# Patient Record
Sex: FEMALE | Race: WHITE | NOT HISPANIC OR LATINO | Employment: OTHER | URBAN - METROPOLITAN AREA
[De-identification: names, ages, dates, MRNs, and addresses within clinical notes are randomized per-mention and may not be internally consistent; named-entity substitution may affect disease eponyms.]

---

## 2017-06-14 ENCOUNTER — TELEPHONE (OUTPATIENT)
Dept: INTERNAL MEDICINE | Facility: CLINIC | Age: 82
End: 2017-06-14

## 2017-06-14 NOTE — TELEPHONE ENCOUNTER
VIANCA FROM [facility] IS CALLING TO ADVISE OF PT'S INR TODAY IS 3.7. PT. HAS BEEN TAKING 3MG EVERYDAY EXCEPT TUES. AND FRI. 4.5 MG     plz call her and change to 3 mg qd but only on wed 4.5 mg and recheck inr next week

## 2017-06-28 ENCOUNTER — TELEPHONE (OUTPATIENT)
Dept: INTERNAL MEDICINE | Facility: CLINIC | Age: 82
End: 2017-06-28

## 2017-06-28 NOTE — TELEPHONE ENCOUNTER
Renee from [facility] is calling to advise pt's INR is 3.0. She has been currently taking 3 mg everyday except Wed. And she takes 4.5 mg. Please call to advise instructions.

## 2017-11-30 ENCOUNTER — TELEPHONE (OUTPATIENT)
Dept: INTERNAL MEDICINE | Facility: CLINIC | Age: 82
End: 2017-11-30

## 2017-12-01 ENCOUNTER — TELEPHONE (OUTPATIENT)
Dept: INTERNAL MEDICINE | Facility: CLINIC | Age: 82
End: 2017-12-01

## 2017-12-01 ENCOUNTER — HOSPITAL ENCOUNTER (INPATIENT)
Facility: HOSPITAL | Age: 82
LOS: 13 days | Discharge: HOSPICE | End: 2017-12-14
Attending: EMERGENCY MEDICINE | Admitting: INTERNAL MEDICINE

## 2017-12-01 ENCOUNTER — APPOINTMENT (OUTPATIENT)
Dept: GENERAL RADIOLOGY | Facility: HOSPITAL | Age: 82
End: 2017-12-01

## 2017-12-01 DIAGNOSIS — I48.21 PERMANENT ATRIAL FIBRILLATION (HCC): ICD-10-CM

## 2017-12-01 DIAGNOSIS — E87.70 HYPERVOLEMIA, UNSPECIFIED HYPERVOLEMIA TYPE: ICD-10-CM

## 2017-12-01 DIAGNOSIS — J18.9 PNEUMONIA OF BOTH LOWER LOBES DUE TO INFECTIOUS ORGANISM: ICD-10-CM

## 2017-12-01 DIAGNOSIS — R09.02 HYPOXEMIA: ICD-10-CM

## 2017-12-01 DIAGNOSIS — N39.0 URINARY TRACT INFECTION WITHOUT HEMATURIA, SITE UNSPECIFIED: ICD-10-CM

## 2017-12-01 DIAGNOSIS — I50.9 ACUTE ON CHRONIC CONGESTIVE HEART FAILURE, UNSPECIFIED CONGESTIVE HEART FAILURE TYPE: ICD-10-CM

## 2017-12-01 DIAGNOSIS — R50.9 ACUTE FEBRILE ILLNESS: Primary | ICD-10-CM

## 2017-12-01 PROBLEM — I48.91 ATRIAL FIBRILLATION (HCC): Status: ACTIVE | Noted: 2017-12-01

## 2017-12-01 PROBLEM — N18.9 ACUTE ON CHRONIC KIDNEY FAILURE (HCC): Status: ACTIVE | Noted: 2017-12-01

## 2017-12-01 PROBLEM — A41.9 SEPSIS (HCC): Status: ACTIVE | Noted: 2017-12-01

## 2017-12-01 PROBLEM — E03.9 HYPOTHYROIDISM: Status: ACTIVE | Noted: 2017-12-01

## 2017-12-01 PROBLEM — R79.1 SUPRATHERAPEUTIC INR: Status: ACTIVE | Noted: 2017-12-01

## 2017-12-01 PROBLEM — N18.9 CHRONIC KIDNEY DISEASE: Status: ACTIVE | Noted: 2017-12-01

## 2017-12-01 PROBLEM — N17.9 ACUTE ON CHRONIC KIDNEY FAILURE (HCC): Status: ACTIVE | Noted: 2017-12-01

## 2017-12-01 PROBLEM — D64.9 ANEMIA: Status: ACTIVE | Noted: 2017-12-01

## 2017-12-01 PROBLEM — I69.351 HEMIPARESIS AFFECTING RIGHT SIDE AS LATE EFFECT OF STROKE (HCC): Status: ACTIVE | Noted: 2017-12-01

## 2017-12-01 LAB
ALBUMIN SERPL-MCNC: 3.6 G/DL (ref 3.2–4.8)
ALBUMIN/GLOB SERPL: 1 G/DL (ref 1.5–2.5)
ALP SERPL-CCNC: 51 U/L (ref 25–100)
ALT SERPL W P-5'-P-CCNC: 6 U/L (ref 7–40)
ANION GAP SERPL CALCULATED.3IONS-SCNC: 10 MMOL/L (ref 3–11)
APTT PPP: 89.3 SECONDS (ref 24–31)
ARTERIAL PATENCY WRIST A: ABNORMAL
AST SERPL-CCNC: 26 U/L (ref 0–33)
ATMOSPHERIC PRESS: ABNORMAL MMHG
BACTERIA UR QL AUTO: ABNORMAL /HPF
BASE EXCESS BLDA CALC-SCNC: -2.6 MMOL/L (ref 0–2)
BASOPHILS # BLD AUTO: 0.01 10*3/MM3 (ref 0–0.2)
BASOPHILS NFR BLD AUTO: 0.1 % (ref 0–1)
BDY SITE: ABNORMAL
BILIRUB SERPL-MCNC: 0.3 MG/DL (ref 0.3–1.2)
BILIRUB UR QL STRIP: NEGATIVE
BNP SERPL-MCNC: 1060 PG/ML (ref 0–100)
BUN BLD-MCNC: 40 MG/DL (ref 9–23)
BUN/CREAT SERPL: 16.7 (ref 7–25)
CALCIUM SPEC-SCNC: 8.4 MG/DL (ref 8.7–10.4)
CHLORIDE SERPL-SCNC: 101 MMOL/L (ref 99–109)
CLARITY UR: ABNORMAL
CO2 BLDA-SCNC: 22.9 MMOL/L (ref 22–33)
CO2 SERPL-SCNC: 22 MMOL/L (ref 20–31)
COHGB MFR BLD: 0.7 % (ref 0–2)
COLOR UR: ABNORMAL
CREAT BLD-MCNC: 2.4 MG/DL (ref 0.6–1.3)
CREAT UR-MCNC: 137.2 MG/DL
D-LACTATE SERPL-SCNC: 1.3 MMOL/L (ref 0.5–2)
DEPRECATED RDW RBC AUTO: 53.1 FL (ref 37–54)
EOSINOPHIL # BLD AUTO: 0 10*3/MM3 (ref 0–0.3)
EOSINOPHIL NFR BLD AUTO: 0 % (ref 0–3)
EOSINOPHIL SPEC QL MICRO: 0 % EOS/100 CELLS (ref 0–0)
ERYTHROCYTE [DISTWIDTH] IN BLOOD BY AUTOMATED COUNT: 15.5 % (ref 11.3–14.5)
FERRITIN SERPL-MCNC: 248 NG/ML (ref 10–291)
FLUAV SUBTYP SPEC NAA+PROBE: NOT DETECTED
FLUBV RNA ISLT QL NAA+PROBE: NOT DETECTED
FOLATE SERPL-MCNC: 18.34 NG/ML (ref 3.2–20)
GFR SERPL CREATININE-BSD FRML MDRD: 19 ML/MIN/1.73
GLOBULIN UR ELPH-MCNC: 3.6 GM/DL
GLUCOSE BLD-MCNC: 104 MG/DL (ref 70–100)
GLUCOSE UR STRIP-MCNC: NEGATIVE MG/DL
HCO3 BLDA-SCNC: 21.9 MMOL/L (ref 20–26)
HCT VFR BLD AUTO: 29.3 % (ref 34.5–44)
HCT VFR BLD CALC: 29.7 %
HGB BLD-MCNC: 9.6 G/DL (ref 11.5–15.5)
HGB BLDA-MCNC: 9.7 G/DL (ref 14–18)
HGB UR QL STRIP.AUTO: ABNORMAL
HOLD SPECIMEN: NORMAL
HOROWITZ INDEX BLD+IHG-RTO: 60 %
HYALINE CASTS UR QL AUTO: ABNORMAL /LPF
IMM GRANULOCYTES # BLD: 0.06 10*3/MM3 (ref 0–0.03)
IMM GRANULOCYTES NFR BLD: 0.3 % (ref 0–0.6)
INR PPP: ABNORMAL
IRON 24H UR-MRATE: 8 MCG/DL (ref 50–175)
IRON SATN MFR SERPL: 3 % (ref 15–50)
KETONES UR QL STRIP: NEGATIVE
LEUKOCYTE ESTERASE UR QL STRIP.AUTO: ABNORMAL
LYMPHOCYTES # BLD AUTO: 1.5 10*3/MM3 (ref 0.6–4.8)
LYMPHOCYTES NFR BLD AUTO: 8.1 % (ref 24–44)
MCH RBC QN AUTO: 30.6 PG (ref 27–31)
MCHC RBC AUTO-ENTMCNC: 32.8 G/DL (ref 32–36)
MCV RBC AUTO: 93.3 FL (ref 80–99)
METHGB BLD QL: 0.9 % (ref 0–1.5)
MODALITY: ABNORMAL
MONOCYTES # BLD AUTO: 1.08 10*3/MM3 (ref 0–1)
MONOCYTES NFR BLD AUTO: 5.8 % (ref 0–12)
NEUTROPHILS # BLD AUTO: 15.88 10*3/MM3 (ref 1.5–8.3)
NEUTROPHILS NFR BLD AUTO: 85.7 % (ref 41–71)
NITRITE UR QL STRIP: NEGATIVE
OXYHGB MFR BLDV: 96.2 % (ref 94–99)
PCO2 BLDA: 35.4 MM HG (ref 35–45)
PH BLDA: 7.4 PH UNITS (ref 7.35–7.45)
PH UR STRIP.AUTO: 5.5 [PH] (ref 5–8)
PLATELET # BLD AUTO: 285 10*3/MM3 (ref 150–450)
PMV BLD AUTO: 9.8 FL (ref 6–12)
PO2 BLDA: 102 MM HG (ref 83–108)
POTASSIUM BLD-SCNC: 4.1 MMOL/L (ref 3.5–5.5)
PROCALCITONIN SERPL-MCNC: 14.59 NG/ML
PROT SERPL-MCNC: 7.2 G/DL (ref 5.7–8.2)
PROT UR QL STRIP: ABNORMAL
PROTHROMBIN TIME: >100 SECONDS (ref 9.6–11.5)
RBC # BLD AUTO: 3.14 10*6/MM3 (ref 3.89–5.14)
RBC # UR: ABNORMAL /HPF
REF LAB TEST METHOD: ABNORMAL
SAO2 % BLDCOA: 96.2 %
SODIUM BLD-SCNC: 133 MMOL/L (ref 132–146)
SODIUM UR-SCNC: 37 MMOL/L (ref 30–90)
SP GR UR STRIP: 1.02 (ref 1–1.03)
SQUAMOUS #/AREA URNS HPF: ABNORMAL /HPF
TIBC SERPL-MCNC: 239 MCG/DL (ref 250–450)
UROBILINOGEN UR QL STRIP: ABNORMAL
VIT B12 BLD-MCNC: 901 PG/ML (ref 211–911)
WBC NRBC COR # BLD: 18.53 10*3/MM3 (ref 3.5–10.8)
WBC UR QL AUTO: ABNORMAL /HPF
WHOLE BLOOD HOLD SPECIMEN: NORMAL
WHOLE BLOOD HOLD SPECIMEN: NORMAL

## 2017-12-01 PROCEDURE — 36600 WITHDRAWAL OF ARTERIAL BLOOD: CPT | Performed by: NURSE PRACTITIONER

## 2017-12-01 PROCEDURE — 80053 COMPREHEN METABOLIC PANEL: CPT | Performed by: EMERGENCY MEDICINE

## 2017-12-01 PROCEDURE — 93005 ELECTROCARDIOGRAM TRACING: CPT | Performed by: EMERGENCY MEDICINE

## 2017-12-01 PROCEDURE — 99284 EMERGENCY DEPT VISIT MOD MDM: CPT

## 2017-12-01 PROCEDURE — 83880 ASSAY OF NATRIURETIC PEPTIDE: CPT | Performed by: EMERGENCY MEDICINE

## 2017-12-01 PROCEDURE — 82570 ASSAY OF URINE CREATININE: CPT | Performed by: NURSE PRACTITIONER

## 2017-12-01 PROCEDURE — 85730 THROMBOPLASTIN TIME PARTIAL: CPT | Performed by: NURSE PRACTITIONER

## 2017-12-01 PROCEDURE — 82728 ASSAY OF FERRITIN: CPT | Performed by: NURSE PRACTITIONER

## 2017-12-01 PROCEDURE — 84300 ASSAY OF URINE SODIUM: CPT | Performed by: NURSE PRACTITIONER

## 2017-12-01 PROCEDURE — 87502 INFLUENZA DNA AMP PROBE: CPT | Performed by: NURSE PRACTITIONER

## 2017-12-01 PROCEDURE — 83550 IRON BINDING TEST: CPT | Performed by: NURSE PRACTITIONER

## 2017-12-01 PROCEDURE — 92610 EVALUATE SWALLOWING FUNCTION: CPT

## 2017-12-01 PROCEDURE — 83605 ASSAY OF LACTIC ACID: CPT | Performed by: EMERGENCY MEDICINE

## 2017-12-01 PROCEDURE — 87086 URINE CULTURE/COLONY COUNT: CPT | Performed by: EMERGENCY MEDICINE

## 2017-12-01 PROCEDURE — 83540 ASSAY OF IRON: CPT | Performed by: NURSE PRACTITIONER

## 2017-12-01 PROCEDURE — 87205 SMEAR GRAM STAIN: CPT | Performed by: NURSE PRACTITIONER

## 2017-12-01 PROCEDURE — 25010000002 MEROPENEM: Performed by: NURSE PRACTITIONER

## 2017-12-01 PROCEDURE — 25010000002 DAPTOMYCIN PER 1 MG: Performed by: PHYSICIAN ASSISTANT

## 2017-12-01 PROCEDURE — 25010000002 PIPERACILLIN SOD-TAZOBACTAM PER 1 G: Performed by: EMERGENCY MEDICINE

## 2017-12-01 PROCEDURE — 85610 PROTHROMBIN TIME: CPT | Performed by: NURSE PRACTITIONER

## 2017-12-01 PROCEDURE — 87040 BLOOD CULTURE FOR BACTERIA: CPT | Performed by: EMERGENCY MEDICINE

## 2017-12-01 PROCEDURE — 25010000002 CEFEPIME PER 500 MG: Performed by: PHYSICIAN ASSISTANT

## 2017-12-01 PROCEDURE — 82805 BLOOD GASES W/O2 SATURATION: CPT | Performed by: NURSE PRACTITIONER

## 2017-12-01 PROCEDURE — 71010 HC CHEST PA OR AP: CPT

## 2017-12-01 PROCEDURE — 84145 PROCALCITONIN (PCT): CPT | Performed by: EMERGENCY MEDICINE

## 2017-12-01 PROCEDURE — 87186 SC STD MICRODIL/AGAR DIL: CPT | Performed by: EMERGENCY MEDICINE

## 2017-12-01 PROCEDURE — 99223 1ST HOSP IP/OBS HIGH 75: CPT | Performed by: HOSPITALIST

## 2017-12-01 PROCEDURE — 82746 ASSAY OF FOLIC ACID SERUM: CPT | Performed by: NURSE PRACTITIONER

## 2017-12-01 PROCEDURE — 87077 CULTURE AEROBIC IDENTIFY: CPT | Performed by: EMERGENCY MEDICINE

## 2017-12-01 PROCEDURE — P9612 CATHETERIZE FOR URINE SPEC: HCPCS

## 2017-12-01 PROCEDURE — 25010000002 VITAMIN K1 PER 1 MG: Performed by: NURSE PRACTITIONER

## 2017-12-01 PROCEDURE — 82607 VITAMIN B-12: CPT | Performed by: NURSE PRACTITIONER

## 2017-12-01 PROCEDURE — 25010000002 VANCOMYCIN: Performed by: EMERGENCY MEDICINE

## 2017-12-01 PROCEDURE — 81001 URINALYSIS AUTO W/SCOPE: CPT | Performed by: EMERGENCY MEDICINE

## 2017-12-01 PROCEDURE — 85025 COMPLETE CBC W/AUTO DIFF WBC: CPT | Performed by: EMERGENCY MEDICINE

## 2017-12-01 RX ORDER — ALLOPURINOL 300 MG/1
150 TABLET ORAL DAILY
COMMUNITY

## 2017-12-01 RX ORDER — LEVOTHYROXINE SODIUM 0.05 MG/1
50 TABLET ORAL DAILY
COMMUNITY

## 2017-12-01 RX ORDER — DIGOXIN 125 MCG
125 TABLET ORAL
COMMUNITY

## 2017-12-01 RX ORDER — SODIUM CHLORIDE 0.9 % (FLUSH) 0.9 %
10 SYRINGE (ML) INJECTION AS NEEDED
Status: DISCONTINUED | OUTPATIENT
Start: 2017-12-01 | End: 2017-12-14 | Stop reason: HOSPADM

## 2017-12-01 RX ORDER — WARFARIN SODIUM 2 MG/1
2 TABLET ORAL SEE ADMIN INSTRUCTIONS
COMMUNITY
End: 2017-12-14 | Stop reason: HOSPADM

## 2017-12-01 RX ORDER — HEPARIN SODIUM 5000 [USP'U]/ML
5000 INJECTION, SOLUTION INTRAVENOUS; SUBCUTANEOUS EVERY 12 HOURS SCHEDULED
Status: DISCONTINUED | OUTPATIENT
Start: 2017-12-01 | End: 2017-12-01

## 2017-12-01 RX ORDER — ONDANSETRON 2 MG/ML
4 INJECTION INTRAMUSCULAR; INTRAVENOUS EVERY 6 HOURS PRN
Status: DISCONTINUED | OUTPATIENT
Start: 2017-12-01 | End: 2017-12-14 | Stop reason: HOSPADM

## 2017-12-01 RX ORDER — POLYETHYLENE GLYCOL 3350 17 G/17G
17 POWDER, FOR SOLUTION ORAL DAILY
COMMUNITY

## 2017-12-01 RX ORDER — CITALOPRAM 20 MG/1
20 TABLET ORAL DAILY
COMMUNITY

## 2017-12-01 RX ORDER — OMEPRAZOLE 20 MG/1
20 CAPSULE, DELAYED RELEASE ORAL DAILY
COMMUNITY
End: 2017-12-14 | Stop reason: HOSPADM

## 2017-12-01 RX ORDER — AMLODIPINE BESYLATE 5 MG/1
5 TABLET ORAL 2 TIMES DAILY
COMMUNITY
End: 2017-12-14 | Stop reason: HOSPADM

## 2017-12-01 RX ORDER — DIVALPROEX SODIUM 500 MG/1
500 TABLET, DELAYED RELEASE ORAL DAILY
Status: DISCONTINUED | OUTPATIENT
Start: 2017-12-01 | End: 2017-12-14 | Stop reason: HOSPADM

## 2017-12-01 RX ORDER — ALLOPURINOL 300 MG/1
150 TABLET ORAL DAILY
Status: DISCONTINUED | OUTPATIENT
Start: 2017-12-01 | End: 2017-12-14 | Stop reason: HOSPADM

## 2017-12-01 RX ORDER — GUAIFENESIN 600 MG/1
600 TABLET, EXTENDED RELEASE ORAL 2 TIMES DAILY
COMMUNITY
End: 2017-12-14 | Stop reason: HOSPADM

## 2017-12-01 RX ORDER — DIGOXIN 125 MCG
125 TABLET ORAL
Status: DISCONTINUED | OUTPATIENT
Start: 2017-12-02 | End: 2017-12-14 | Stop reason: HOSPADM

## 2017-12-01 RX ORDER — MELATONIN
1000 DAILY
COMMUNITY

## 2017-12-01 RX ORDER — MIRTAZAPINE 15 MG/1
7.5 TABLET, FILM COATED ORAL NIGHTLY
COMMUNITY
End: 2017-12-14 | Stop reason: HOSPADM

## 2017-12-01 RX ORDER — SODIUM CHLORIDE 9 MG/ML
75 INJECTION, SOLUTION INTRAVENOUS CONTINUOUS
Status: DISCONTINUED | OUTPATIENT
Start: 2017-12-01 | End: 2017-12-04

## 2017-12-01 RX ORDER — SODIUM CHLORIDE 0.9 % (FLUSH) 0.9 %
1-10 SYRINGE (ML) INJECTION AS NEEDED
Status: DISCONTINUED | OUTPATIENT
Start: 2017-12-01 | End: 2017-12-14 | Stop reason: HOSPADM

## 2017-12-01 RX ORDER — WARFARIN SODIUM 3 MG/1
3 TABLET ORAL
Status: DISCONTINUED | OUTPATIENT
Start: 2017-12-02 | End: 2017-12-01 | Stop reason: SDUPTHER

## 2017-12-01 RX ORDER — CITALOPRAM 20 MG/1
20 TABLET ORAL DAILY
Status: DISCONTINUED | OUTPATIENT
Start: 2017-12-01 | End: 2017-12-14 | Stop reason: HOSPADM

## 2017-12-01 RX ORDER — ACETAMINOPHEN 325 MG/1
650 TABLET ORAL EVERY 6 HOURS PRN
COMMUNITY

## 2017-12-01 RX ORDER — L.ACID,PARA/B.BIFIDUM/S.THERM 8B CELL
1 CAPSULE ORAL DAILY
Status: DISCONTINUED | OUTPATIENT
Start: 2017-12-01 | End: 2017-12-14 | Stop reason: HOSPADM

## 2017-12-01 RX ORDER — LEVOTHYROXINE SODIUM 0.05 MG/1
50 TABLET ORAL DAILY
Status: DISCONTINUED | OUTPATIENT
Start: 2017-12-01 | End: 2017-12-14 | Stop reason: HOSPADM

## 2017-12-01 RX ORDER — SENNA AND DOCUSATE SODIUM 50; 8.6 MG/1; MG/1
2 TABLET, FILM COATED ORAL 2 TIMES DAILY PRN
Status: DISCONTINUED | OUTPATIENT
Start: 2017-12-01 | End: 2017-12-14 | Stop reason: HOSPADM

## 2017-12-01 RX ORDER — ATORVASTATIN CALCIUM 10 MG/1
10 TABLET, FILM COATED ORAL DAILY
Status: DISCONTINUED | OUTPATIENT
Start: 2017-12-01 | End: 2017-12-14 | Stop reason: HOSPADM

## 2017-12-01 RX ORDER — MIRTAZAPINE 15 MG/1
7.5 TABLET, FILM COATED ORAL NIGHTLY
Status: DISCONTINUED | OUTPATIENT
Start: 2017-12-01 | End: 2017-12-13

## 2017-12-01 RX ORDER — WARFARIN SODIUM 3 MG/1
3 TABLET ORAL SEE ADMIN INSTRUCTIONS
COMMUNITY
End: 2017-12-14 | Stop reason: HOSPADM

## 2017-12-01 RX ORDER — DIVALPROEX SODIUM 500 MG/1
500 TABLET, DELAYED RELEASE ORAL DAILY
COMMUNITY

## 2017-12-01 RX ORDER — PRAVASTATIN SODIUM 40 MG
40 TABLET ORAL DAILY
COMMUNITY
End: 2017-12-14 | Stop reason: HOSPADM

## 2017-12-01 RX ADMIN — DAPTOMYCIN 250 MG: 500 INJECTION, POWDER, LYOPHILIZED, FOR SOLUTION INTRAVENOUS at 21:22

## 2017-12-01 RX ADMIN — MEROPENEM 1 G: 1 INJECTION, POWDER, FOR SOLUTION INTRAVENOUS at 17:06

## 2017-12-01 RX ADMIN — WATER 1 G: 1 INJECTION INTRAMUSCULAR; INTRAVENOUS; SUBCUTANEOUS at 21:22

## 2017-12-01 RX ADMIN — SODIUM CHLORIDE 75 ML/HR: 9 INJECTION, SOLUTION INTRAVENOUS at 17:08

## 2017-12-01 RX ADMIN — PHYTONADIONE 5 MG: 10 INJECTION, EMULSION INTRAMUSCULAR; INTRAVENOUS; SUBCUTANEOUS at 17:42

## 2017-12-01 RX ADMIN — Medication 1 CAPSULE: at 21:29

## 2017-12-01 RX ADMIN — TAZOBACTAM SODIUM AND PIPERACILLIN SODIUM 4.5 G: 500; 4 INJECTION, SOLUTION INTRAVENOUS at 12:03

## 2017-12-01 RX ADMIN — METRONIDAZOLE 500 MG: 500 INJECTION, SOLUTION INTRAVENOUS at 21:29

## 2017-12-01 RX ADMIN — DOXYCYCLINE 100 MG: 100 INJECTION, POWDER, LYOPHILIZED, FOR SOLUTION INTRAVENOUS at 18:44

## 2017-12-01 RX ADMIN — VANCOMYCIN HYDROCHLORIDE 1250 MG: 1 INJECTION, POWDER, LYOPHILIZED, FOR SOLUTION INTRAVENOUS at 13:14

## 2017-12-01 NOTE — THERAPY EVALUATION
Acute Care - Speech Language Pathology   Swallow Initial Evaluation [facility]     Patient Name: Katie Crocker  : 3/1/1923  MRN: [id number]  Today's Date: 2017               Admit Date: 2017    Visit Dx:     ICD-10-CM ICD-9-CM   1. Acute febrile illness R50.9 780.60   2. Pneumonia of both lower lobes due to infectious organism J18.9 483.8   3. Hypervolemia, unspecified hypervolemia type E87.70 276.69   4. Acute on chronic congestive heart failure, unspecified congestive heart failure type I50.9 428.0   5. Urinary tract infection without hematuria, site unspecified N39.0 599.0   6. Hypoxemia R09.02 799.02   7. Permanent atrial fibrillation I48.2 427.31     Patient Active Problem List   Diagnosis   • Sepsis     Past Medical History:   Diagnosis Date   • Bipolar disorder    • CHF (congestive heart failure)    • Dehydration    • Depression    • Disease of thyroid gland    • Diverticulosis    • GERD (gastroesophageal reflux disease)    • Gout    • Hyperkalemia    • Hyperlipidemia    • Hypertension    • Insomnia    • Pneumonia    • Renal disorder    • Sepsis    • Stroke    • SVT (supraventricular tachycardia)    • TIA (transient ischemic attack)    • UTI (urinary tract infection)      History reviewed. No pertinent surgical history.       SWALLOW EVALUATION (last 72 hours)      Swallow Evaluation       17 1530                Rehab Evaluation    Document Type evaluation  -SM        Subjective Information decreased LOC  -SM        General Information    Patient Profile Review yes  -SM        Subjective Patient Observations Lethargic, not arousing with tactile attempts  -SM        Pertinent History Of Current Problem B PNA, UTI  -SM        Current Diet Limitations NPO  -SM        Prior Level of Function- Swallowing diet modifications- foods;other (comment)   mech-soft per dtr, hx period of puree  -SM        Plans/Goals Discussed With family;agreed upon  -SM        Barriers to Rehab medically  complex  -SM        Clinical Impression    Family Goals For Discharge patient able to return to PO diet;patient able to eat/drink without coughing/choking  -        SLP Diet Recommendation NPO: unsafe for food/liquid intake  -        Recommended Diagnostics reassess via clinical swallow (non-instrumental exam)   when more alert  -        SLP Rec. for Method of Medication Administration --   will defer decision to MD as no PO trialed today  -        Pain Assessment    Pain Assessment Unable to assess  -        Clinical Swallow Exam    Summary of Clinical Exam Dysphagia: SLP in room observing pt interaction with NP. Pt not arousing to her attempts. NP conveys not safe for SLP to trial any PO. Spoke with dtr who conveys hx of both puree and mech-soft diet (recently mech-soft); has never had alterations needed to liquids. SLP will check back in AM for appropriateness for dysphagia eval. Will defer PO decisions, including meds, to MD until then. Thanks   -          User Key  (r) = Recorded By, (t) = Taken By, (c) = Cosigned By    Initials Name Effective Dates     Otilia Ivy MS CCC-SLP 06/22/15 -         EDUCATION  The patient's dtr has been educated in the following areas:   Dysphagia (Swallowing Impairment) NPO rationale.    SLP Recommendation and Plan     SLP Diet Recommendation: NPO: unsafe for food/liquid intake     SLP Rec. for Method of Medication Administration:  (will defer decision to MD as no PO trialed today)     Recommended Diagnostics: reassess via clinical swallow (non-instrumental exam) (when more alert)                         Plan of Care Review  Plan Of Care Reviewed With: daughter  Outcome Summary/Follow up Plan: Dysphagia: SLP in room observing pt interaction with NP. Pt not arousing to her attempts. NP conveys not safe for SLP to trial any PO. Spoke with dtr who conveys hx of both puree and mech-soft diet (recently mech-soft); has never had alterations needed to liquids. SLP  will check back in AM for appropriateness for dysphagia eval. Will defer PO decisions, including meds, to MD until then. Thanks              Time Calculation:         Time Calculation- SLP       12/01/17 1602          Time Calculation- SLP    SLP Start Time 1530  -      SLP Received On 12/01/17  -        User Key  (r) = Recorded By, (t) = Taken By, (c) = Cosigned By    Initials Name Provider Type     Otilia Ivy MS CCC-SLP Speech and Language Pathologist          Therapy Charges for Today     Code Description Service Date Service Provider Modifiers Qty    [id number] HC ST EVAL ORAL PHARYNG SWALLOW 3 12/1/2017 Otilia Ivy MS CCC-SLP GN 1               Otilia Ivy MS CCC-SLP  12/1/2017

## 2017-12-01 NOTE — TELEPHONE ENCOUNTER
VIANCA SAID THAT THE PATIENTS OXYGEN LEVEL IS LOW AND CANT GET UP. PATIENT STILL HAS A FEVER .2. PLEASE GIVE VIANCA A CALL.

## 2017-12-01 NOTE — PROGRESS NOTES
Discharge Planning Assessment  [facility]     Patient Name: Katie Crocker  MRN: [id number]  Today's Date: 12/1/2017    Admit Date: 12/1/2017          Discharge Needs Assessment       12/01/17 1423    Living Environment    Lives With facility resident    Living Arrangements extended care facility    Home Accessibility no concerns    Transportation Available van, wheelchair accessible    Living Environment    Provides Primary Care For no one    Primary Care Provided By other (see comments)    Caregiving Concerns [facility], long term care    Quality Of Family Relationships unable to assess    Discharge Needs Assessment    Concerns To Be Addressed no discharge needs identified    Readmission Within The Last 30 Days no previous admission in last 30 days    Anticipated Changes Related to Illness none    Equipment Currently Used at Home wheelchair    Equipment Needed After Discharge none    Discharge Contact Information if Applicable Alma Crocker, daughter/POA, [phone]            Discharge Plan       12/01/17 1425    Case Management/Social Work Plan    Plan initial    Additional Comments Pt lives in long term care facility, uses a wheelchair, transports on wheelchair van. She can feed herself at baseline, incontinent bowel and bladder. PCP is Jose Armando Sharif        Discharge Placement     No information found                Demographic Summary     None            Functional Status       12/01/17 1421    Functional Status Current    Ambulation 4-->completely dependent    Transferring 4-->completely dependent    Toileting 4-->completely dependent    Communication 2-->difficulty understanding and speaking (not related to language barrier)    Swallowing (if score 2 or more for any item, consult Rehab Services) 2-->difficulty swallowing liquids/foods    Change in Functional Status Since Onset of Current Illness/Injury yes    Functional Status Prior    Ambulation 3-->assistive equipment and person     Transferring 2-->assistive person    Toileting 2-->assistive person    Bathing 2-->assistive person    Dressing 2-->assistive person    Eating 0-->independent    Communication 0-->understands/communicates without difficulty    Swallowing 2-->difficulty swallowing liquids    Prior Functional Level Comment meds crushed in applesauce    IADL    Medications assistive person    Meal Preparation assistive person    Housekeeping assistive person    Laundry assistive person    Shopping assistive person    Oral Care independent    Activity Tolerance    Current Activity Limitations none    Usual Activity Tolerance moderate    Current Activity Tolerance poor    Cognitive/Perceptual/Developmental    Current Mental Status/Cognitive Functioning unable to assess    Recent Changes in Mental Status/Cognitive Functioning unable to assess    Developmental Stage (Eriksson's Stages of Development) Stage 8 (65 years-death/Late Adulthood) Integrity vs. Despair            Psychosocial     None            Abuse/Neglect     None            Legal     None            Substance Abuse     None            Patient Forms     None          Nidhi Garcia

## 2017-12-01 NOTE — CONSULTS
INFECTIOUS DISEASE CONSULT/INITIAL HOSPITAL VISIT    Katie Crocker  3/1/1923  [id number]    Date of Consult: 12/1/2017    Admission Date: 12/1/2017      Requesting Provider: Jerry Adames MD  Evaluating Physician: Alvaro Antonio MD    Reason for Consultation: Sepsis, pneumonia, UTI, with h/o MDR UTI    History of present illness:    Patient is a 89 y.o. female with h/o CHF, hypothyroidism, HTN, bipolar disorder, CVA, SVT, GERD, and MDR UTI who presented to [facility] ED with fever.  She denies shortness of breath, cough, nausea, vomiting, diarrhea, dysuria, or hematuria.  She is fatigued.  She now has altered mental status changes and is more non-responsive. All of the information was obtained from the chart.  Her Tmax is 102.8, WBC 18,500 with 86% neutrophils, lactic acid 1.3, BNP 1060, procalcitonin 14.6, and sCr 2.4.  UA WBC is TNTC.  Urine and blood cultures are pending.  CXR showed ill-defined airspace linear nodular opacities in mid to lower lung zone with dense consolidation at bases L>R.  She was started on IV Doxycycline, IV Merrem, and IV Vancomycin.  ID was asked to evaluate and manage her antibiotic therapy.     Past Medical History:   Diagnosis Date   • Bipolar disorder    • CHF (congestive heart failure)    • Dehydration    • Depression    • Disease of thyroid gland    • Diverticulosis    • GERD (gastroesophageal reflux disease)    • Gout    • Hyperkalemia    • Hyperlipidemia    • Hypertension    • Insomnia    • Pneumonia    • Renal disorder    • Sepsis    • Stroke    • SVT (supraventricular tachycardia)    • TIA (transient ischemic attack)    • UTI (urinary tract infection)        Past Surgical History:   Procedure Laterality Date   • BACK SURGERY     • HYSTERECTOMY     • REPLACEMENT TOTAL KNEE BILATERAL         Family History   Problem Relation Age of Onset   • Stroke Mother    • Pneumonia Father        Social History     Social History   • Marital status:      Spouse name: N/A   •  Number of children: N/A   • Years of education: N/A     Occupational History   • Not on file.     Social History Main Topics   • Smoking status: Never Smoker   • Smokeless tobacco: Never Used   • Alcohol use No   • Drug use: No   • Sexual activity: Defer     Other Topics Concern   • Not on file     Social History Narrative    Lives in [facility], bed and wheelchair bound x 6 years       Allergies   Allergen Reactions   • Lithium          Medication:    Current Facility-Administered Medications:   •  [START ON 12/2/2017] ! vanc level 1100 12/2. Pharmacist will review and order dose if indicated, , Does not apply, Continuous PRN, Jerry Adames MD  •  allopurinol (ZYLOPRIM) tablet 150 mg, 150 mg, Oral, Daily, MOODY Ruelas  •  atorvastatin (LIPITOR) tablet 10 mg, 10 mg, Oral, Daily, MOODY Ruelas  •  citalopram (CeleXA) tablet 20 mg, 20 mg, Oral, Daily, MOODY Ruelas  •  [START ON 12/2/2017] digoxin (LANOXIN) tablet 125 mcg, 125 mcg, Oral, Daily, MOODY Ruelas  •  divalproex (DEPAKOTE) DR tablet 500 mg, 500 mg, Oral, Daily, MOODY Ruelas  •  doxycycline (VIBRAMYCIN) 100 mg/100 mL 0.9% NS MBP, 100 mg, Intravenous, Q12H, MOODY Ruelas  •  levothyroxine (SYNTHROID, LEVOTHROID) tablet 50 mcg, 50 mcg, Oral, Daily, MOODY Ruelas  •  meropenem (MERREM) 1 g/100 mL 0.9% NS VTB (mbp), 1 g, Intravenous, Once, MOODY Ruelas  •  [START ON 12/2/2017] meropenem (MERREM) 500mg/100 mL 0.9% NS IVPB (mbp), 500 mg, Intravenous, Q24H, MOODY Ruelas  •  mirtazapine (REMERON) tablet 7.5 mg, 7.5 mg, Oral, Nightly, MOODY Ruelas  •  ondansetron (ZOFRAN) injection 4 mg, 4 mg, Intravenous, Q6H PRN, MOODY Ruelas  •  Pharmacy to dose vancomycin, , Does not apply, Continuous PRN, Reina Mikhail, APRN  •  Pharmacy to dose warfarin, , Does not apply, Continuous PRN, Reina Elizondo, MOODY  •  polyethylene glycol (MIRALAX) powder 17 g,  17 g, Oral, Daily PRN, MOODY Ruelas  •  sennosides-docusate sodium (SENOKOT-S) 8.6-50 MG tablet 2 tablet, 2 tablet, Oral, BID PRN, MOODY Ruelas  •  sodium chloride 0.9 % flush 1-10 mL, 1-10 mL, Intravenous, PRN, MOODY Ruelas  •  sodium chloride 0.9 % flush 10 mL, 10 mL, Intravenous, PRN, Jacky Condon MD  •  sodium chloride 0.9 % infusion, 75 mL/hr, Intravenous, Continuous, MOODY Ruelas  •  [START ON 12/2/2017] vancomycin (dosing per levels), , Does not apply, Daily, Jerry Adames MD  •  [START ON 12/2/2017] warfarin (COUMADIN) (dosing per levels), , Does not apply, Daily, Jerry Adames MD    Antibiotics:  IV Anti-Infectives     Ordered     Dose/Rate Route Frequency Start Stop    12/01/17 1612  meropenem (MERREM) 500mg/100 mL 0.9% NS IVPB (mbp)     Ordering Provider:  MOODY Ruelas    500 mg  over 4 Hours Intravenous Every 24 Hours 12/02/17 2100 12/10/17 2059    12/01/17 1616  vancomycin (dosing per levels)     Ordering Provider:  Jerry Adames MD     Does not apply Daily 12/02/17 1200 12/16/17 0859    12/01/17 1554  doxycycline (VIBRAMYCIN) 100 mg/100 mL 0.9% NS MBP     Ordering Provider:  MOODY Ruelas    100 mg  over 60 Minutes Intravenous Every 12 Hours 12/01/17 1800 12/08/17 1759    12/01/17 1553  meropenem (MERREM) 1 g/100 mL 0.9% NS VTB (mbp)     Ordering Provider:  MOODY Ruelas    1 g  over 30 Minutes Intravenous Once 12/01/17 1630      12/01/17 1554  Pharmacy to dose vancomycin     Ordering Provider:  MOODY Ruelas     Does not apply Continuous PRN 12/01/17 1554 12/08/17 1553    12/01/17 1142  vancomycin 1250 mg/250 mL 0.9% NS IVPB ([facility])     Ordering Provider:  Jacky Condon MD    20 mg/kg × 59.9 kg  over 90 Minutes Intravenous Once 12/01/17 1144 12/01/17 1444    12/01/17 1142  piperacillin-tazobactam (ZOSYN) 4.5 g in iso-osmotic dextrose 100 mL IVPB (premix)     Ordering Provider:  Jacky  Christina Condon MD    4.5 g Intravenous Once 17 1143 17 1233            Review of Systems:  Unable to obtain secondary to encephalopathy.       Physical Exam:   Vital Signs  Temp (24hrs), Av.7 °F (37.6 °C), Min:98.1 °F (36.7 °C), Max:102.8 °F (39.3 °C)    Temp  Min: 98.1 °F (36.7 °C)  Max: 102.8 °F (39.3 °C)  BP  Min: 94/53  Max: 105/54  Pulse  Min: 92  Max: 105  Resp  Min: 34  Max: 36  SpO2  Min: 86 %  Max: 95 %    GENERAL: asleep, difficult to awaken, nonresponsive  HEENT: Normocephalic, atraumatic.  PERRL. EOMI. No conjunctival injection. No icterus. Oropharynx clear without evidence of thrush or exudate. No evidence of peridontal disease.    NECK: Supple without nuchal rigidity. No mass.  LYMPH: No cervical, axillary or inguinal lymphadenopathy.  HEART: RRR; No murmur, rubs, gallops.   LUNGS: Diminished at lung bases bilaterally with coarse breath sounds.  Normal respiratory effort. Nonlabored.   ABDOMEN: Soft, nontender, nondistended. Positive bowel sounds. No rebound or guarding. NO mass or HSM.  EXT:  No cyanosis, clubbing or edema. No cord.  : Genitalia generally unremarkable.  With Self catheter.  MSK: no  joint effusions noted arms/legs.    SKIN: Warm and dry without cutaneous eruptions on Inspection/palpation.    NEURO:Unable to assess secondary to encephalopathy    Laboratory Data      Results from last 7 days  Lab Units 17  1139   WBC 10*3/mm3 18.53*   HEMOGLOBIN g/dL 9.6*   HEMATOCRIT % 29.3*   PLATELETS 10*3/mm3 285       Results from last 7 days  Lab Units 17  1219   SODIUM mmol/L 133   POTASSIUM mmol/L 4.1   CHLORIDE mmol/L 101   CO2 mmol/L 22.0   BUN mg/dL 40*   CREATININE mg/dL 2.40*   GLUCOSE mg/dL 104*   CALCIUM mg/dL 8.4*       Results from last 7 days  Lab Units 17  1219   ALK PHOS U/L 51   BILIRUBIN mg/dL 0.3   ALT (SGPT) U/L 6*   AST (SGOT) U/L 26               Results from last 7 days  Lab Units 17  1139   LACTATE mmol/L 1.3             Estimated  Creatinine Clearance: 13.6 mL/min (by C-G formula based on Cr of 2.4).      Microbiology:     Radiology:  Imaging Results (last 72 hours)     Procedure Component Value Units Date/Time    XR Chest 1 View [[id number]] Collected:  12/01/17 1330     Updated:  12/01/17 1330    Narrative:       EXAMINATION: XR CHEST 1 VW- 12/01/2017     INDICATION: shortness of air     COMPARISON: NONE     FINDINGS: Cardiomegaly is noted. Bibasilar airspace opacities are noted  which are ill-defined and irregular. There is mild nodularity and  congestive abnormalities in the hilar areas. The upper lung zones are  clear.           Impression:       1. Cardiomegaly is noted with ill-defined airspace linear nodular  opacities in the mid and lower lung zones with dense consolidation at  the bases, worse on the left than right.  2. Upper lung zones are otherwise clear.     D:  12/01/2017  E:  12/01/2017                Discussion:   89 yo female was admitted 12/1 with sepsis, pneumonia, and UTI for IV antibiotics.  She has a h/o recurrent pneumonia and MDR UTIs.  A urine culture from 5/4/15 was positive for E. Coli at [facility], VRE at [facility], MDR E. Coli at [facility].  She is currently on Merrem, Doxycycline, and Vancomycin.      Problem List:   --Severe sepsis POA with ARF, leukocytosis, fever, encephalopathy, and elevated procalcitonin.  --Acute complicated recurrent UTI, cx pending.  H/o MDR UTIs; including MDR E. Coli and VRE at [facility] in 2015 - I reviewed outside records.  --Basilar pneumonia  --ARF  --Leukocytosis/neutrophilia  --Fever  --Elevated procalcitonin  --Acute encephalopathy, probably metabolic.  --Acute probable systolic CHF  --Hypothyroidism  --Bipolar disorder  --H/o CVA  --h/o chronic coumadin      PLAN/RECOMMENDATIONS:   Thank you for asking us to see Katie Crocker, Recommend the following:  --Monitor cultures, labs, UMU  --D/c Merrem and de-escalate to Cefepime 1 GM IV daily  --D/c Vancomycin and change to Daptomycin 4 mg/kg IV  Q48H for empiric VRE coverage given prior cultures - I do not see a reason to cover for MRSA pneumonia.  --Continue Doxycyline 100 mg IV BID for atypical coverage, can change to PO when more alert.   --Start probiotic if possible.   --Add Flagyl for anaerobic coverage with risk for aspiration of oral anaerobes    Complicated case, prognosis guarded.    Alvaro Antonio MD saw and examined patient, verified hx and PE, read all radiographic studies, reviewed labs and micro data, and formulated dx, plan for treatment and all medical decision making.      EVETTE Pepper-C for MD Pola Hernandez PA  12/1/2017  4:36 PM

## 2017-12-01 NOTE — PROGRESS NOTES
Pharmacy Consult-Vancomycin Dosing  Katie Crocker is a  89 y.o. female receiving vancomycin therapy.     Indication:pneumonia  Consulting Provider:  ID Consult:     Goal Trough: 15-20    Current Antimicrobial Therapy  IV Anti-Infectives       Ordered     Dose/Rate Route Frequency Start Stop      12/01/17 1612  meropenem (MERREM) 500mg/100 mL 0.9% NS IVPB (mbp)     Ordering Provider:  MOODY Ruelas    500 mg  over 4 Hours Intravenous Every 24 Hours 12/02/17 2100 12/10/17 2059    12/01/17 1616  vancomycin (dosing per levels)     Ordering Provider:  Jerry Adames MD     Does not apply Daily 12/02/17 1200 12/16/17 0859    12/01/17 1554  doxycycline (VIBRAMYCIN) 100 mg/100 mL 0.9% NS MBP     Ordering Provider:  MOODY Ruelas    100 mg  over 60 Minutes Intravenous Every 12 Hours 12/01/17 1800 12/08/17 1759    12/01/17 1553  meropenem (MERREM) 1 g/100 mL 0.9% NS VTB (mbp)     Ordering Provider:  MOODY Ruelas    1 g  over 30 Minutes Intravenous Once 12/01/17 1630      12/01/17 1554  Pharmacy to dose vancomycin     Ordering Provider:  MOODY Ruelas     Does not apply Continuous PRN 12/01/17 1554 12/08/17 1553    12/01/17 1142  vancomycin 1250 mg/250 mL 0.9% NS IVPB ([facility])     Ordering Provider:  Jacky Condon MD    20 mg/kg × 59.9 kg  over 90 Minutes Intravenous Once 12/01/17 1144 12/01/17 1444    12/01/17 1142  piperacillin-tazobactam (ZOSYN) 4.5 g in iso-osmotic dextrose 100 mL IVPB (premix)     Ordering Provider:  Jacky Condon MD    4.5 g Intravenous Once 12/01/17 1143 12/01/17 1233            Allergies  Allergies as of 12/01/2017 - Miguel as Reviewed 12/01/2017   Allergen Reaction Noted   • Lithium  12/01/2017       Labs      Results from last 7 days     Lab Units 12/01/17  1219   BUN mg/dL 40*   CREATININE mg/dL 2.40*         Results from last 7 days     Lab Units 12/01/17  1139   WBC 10*3/mm3 18.53*       Evaluation of Dosing     Last Dose Received -  "1250mg 12/1    Ht - 63\" (160 cm)  Wt - 132 lb (59.9 kg)    Estimated Creatinine Clearance: 13.6 mL/min (by C-G formula based on Cr of 2.4).    Intake & Output (last 3 days)       None            Microbiology and Radiology  Microbiology Results (last 10 days)       ** No results found for the last 240 hours. **            Evaluation of Level        Assessment/Plan:    Crcl 13, will dose per daily levels.  "

## 2017-12-01 NOTE — PROGRESS NOTES
"Pharmacy Consult  -  Warfarin    Katie Crocker is a  89 y.o. female   Height - 63\" (160 cm)  Weight - 132 lb (59.9 kg)    Consulting Provider: -   Indication: - afib  Goal INR: - 2-3  Home Regimen:   - 2 mg on fridays   - 3 mg all other days      Drug-Drug Interactions with current regimen:   1. Doxycycline, merrem, vanc    Warfarin Dosing During Admission:    Date  12.1           INR  aptt too high to measure inr           Dose  -----                Education Provided:      Labs:      Results from last 7 days     Lab Units 12/01/17  1538 12/01/17  1139   APTT seconds 89.3* --    HEMOGLOBIN g/dL --  9.6*   HEMATOCRIT % --  29.3*   PLATELETS 10*3/mm3 --  285       Results from last 7 days     Lab Units 12/01/17  1219   SODIUM mmol/L 133   POTASSIUM mmol/L 4.1   CHLORIDE mmol/L 101   CO2 mmol/L 22.0   BUN mg/dL 40*   CREATININE mg/dL 2.40*   CALCIUM mg/dL 8.4*   BILIRUBIN mg/dL 0.3   ALK PHOS U/L 51   ALT (SGPT) U/L 6*   AST (SGOT) U/L 26   GLUCOSE mg/dL 104*       Current dietary intake:       Assessment/Plan:             Thank you  Charlie Farmer [facility]  12/1/2017  4:27 PM      "

## 2017-12-01 NOTE — PLAN OF CARE
Problem: Patient Care Overview (Adult)  Goal: Plan of Care Review  Outcome: Ongoing (interventions implemented as appropriate)    12/01/17 1601   Coping/Psychosocial Response Interventions   Plan Of Care Reviewed With daughter   Outcome Evaluation   Outcome Summary/Follow up Plan Dysphagia: SLP in room observing pt interaction with NP. Pt not arousing to her attempts. NP conveys not safe for SLP to trial any PO. Spoke with dtr who conveys hx of both puree and mech-soft diet (recently mech-soft); has never had alterations needed to liquids. SLP will check back in AM for appropriateness for dysphagia eval. Will defer PO decisions, including meds, to NP/MD until then. Thanks

## 2017-12-01 NOTE — H&P
"    [facility] Medicine Services  HISTORY AND PHYSICAL    Patient Name: Katie Crocker  : 3/1/1923  MRN: [id number]  Primary Care Physician: Jose Armando Sharif MD    Subjective   Subjective     Chief Complaint: fever    HPI:  Katie Crocker is a 89 y.o. female with past medical history significant for atrial fibrillation on Coumadin, CVA with right side hemiparesis, hypertension, CHF (EF unknown), frequent urinary tract infections who presents to the emergency department with fever.  She is a nursing home resident ([facility]).  History of present illness is obtained from daughter and chart.  She developed fever on .  Chest x-ray was performed at  and per daughter's report was clear (data deficient).   contacted daughter on  to report no fever.  Fever developed today and she was brought to the emergency department.  In emergency department she was found to have pneumonia, CHF, UTI.  Chart review reveals history of multidrug resistant UTI.  She met sepsis criteria.    On my examination patient is minimally responsive and will not answer any questions.  ================  89 YO F WITH HISTORY OF CHRONIC AFIB ON COUMADIN, MDR UTI, REMOTE CVA WITH RIGHT SIDED RESIDUAL WEAKNESS/WC BOUND & INTERMITTENT DYSPHAGIA, R HEARING LOSS, BIPOLAR D/O AND HYPOTHYROIDISM WHO WAS BROUGHT IN FROM NH DUE TO AMS/FEVER, .8. PT IS UNABLE TO PROVIDE ANY HISTORY, SO HISTORY WAS OBTAINED FROM DAUGHTER, CHART AND ED PROVIDERS. APPARENTLY \"FELT COLD\" ABOUT 3 DAYS AGO, BUT NO OTHER COMPLAINTS. NORMALLY ALERT, PLEASANT, AND CLEAR SPEECH. DIAGNOSED WITH SEVERE SEPSIS SECONDARY TO PNEUMONIA AND UTI, WITH ASSOCIATED COAGULOPATHY- PT >100.    Review of Systems   Unable to obtain due to dementia      Personal History     Past Medical History:   Diagnosis Date   • Bipolar disorder    • CHF (congestive heart failure)    • Dehydration    • Depression    • Disease of thyroid gland    • " Diverticulosis    • GERD (gastroesophageal reflux disease)    • Gout    • Hyperkalemia    • Hyperlipidemia    • Hypertension    • Insomnia    • Pneumonia    • Renal disorder    • Sepsis    • Stroke    • SVT (supraventricular tachycardia)    • TIA (transient ischemic attack)    • UTI (urinary tract infection)        Past Surgical History:   Procedure Laterality Date   • BACK SURGERY     • HYSTERECTOMY     • REPLACEMENT TOTAL KNEE BILATERAL         Family History: family history includes Pneumonia in her father; Stroke in her mother.     Social History:  reports that she has never smoked. She has never used smokeless tobacco. She reports that she does not drink alcohol or use illicit drugs.    Medications:  Prescriptions Prior to Admission   Medication Sig Dispense Refill Last Dose   • acetaminophen (TYLENOL) 325 MG tablet Take 650 mg by mouth Every 6 (Six) Hours As Needed for Mild Pain .      • allopurinol (ZYLOPRIM) 300 MG tablet Take 150 mg by mouth Daily.      • amLODIPine (NORVASC) 5 MG tablet Take 5 mg by mouth 2 (Two) Times a Day.      • cholecalciferol (VITAMIN D3) 1000 units tablet Take 1,000 Units by mouth Daily.      • citalopram (CeleXA) 20 MG tablet Take 20 mg by mouth Daily.      • digoxin (LANOXIN) 125 MCG tablet Take 125 mcg by mouth Daily. 1/2 tablet one time daily      • divalproex (DEPAKOTE) 500 MG DR tablet Take 500 mg by mouth Daily.      • guaiFENesin (MUCINEX) 600 MG 12 hr tablet Take 600 mg by mouth 2 (Two) Times a Day.      • levothyroxine (SYNTHROID, LEVOTHROID) 50 MCG tablet Take 50 mcg by mouth Daily.      • magnesium hydroxide (MILK OF MAGNESIA) 400 MG/5ML suspension Take 30 mL by mouth 2 (Two) Times a Day As Needed for Constipation.      • metoprolol tartrate (LOPRESSOR) 25 MG tablet Take 75 mg by mouth 2 (Two) Times a Day.      • mirtazapine (REMERON) 7.5 MG half tablet Take 7.5 mg by mouth Every Night.      • Multiple Vitamin (THERA-TABS PO) Take 1 tablet by mouth Daily.      •  omeprazole (priLOSEC) 20 MG capsule Take 20 mg by mouth Daily.      • polyethylene glycol (MIRALAX) packet Take 17 g by mouth Daily.      • pravastatin (PRAVACHOL) 40 MG tablet Take 40 mg by mouth Daily.      • warfarin (COUMADIN) 2 MG tablet Take 2 mg by mouth See Admin Instructions. Only on Fridays       • warfarin (COUMADIN) 3 MG tablet Take 3 mg by mouth See Admin Instructions. Everyday but Friday           Allergies   Allergen Reactions   • Lithium        Objective   Objective     Vital Signs:   Temp:  [98.1 °F (36.7 °C)-102.8 °F (39.3 °C)] 98.1 °F (36.7 °C)  Heart Rate:  [] 100  Resp:  [34-36] 34  BP: ()/(52-60) 97/52        Physical Exam   Constitutional: No acute distress, age appropriate  Eyes: PERRLA, sclerae anicteric, no conjunctival injection  HENT: NCAT, mucous membranes dry  Neck: Supple, no thyromegaly, no lymphadenopathy, trachea midline  Respiratory: Rales bilateral bases, poor inspiratory effort   Cardiovascular: Irregularly irregular, atrial fibrillation controlled rate, no murmurs, rubs, or gallops, palpable pedal pulses bilaterally  Gastrointestinal: Positive bowel sounds, soft, nontender, nondistended  Musculoskeletal: No bilateral ankle edema, no clubbing or cyanosis to extremities  Psychiatric: Lethargic, quiet  Neurologic: Right-sided hemo-paresis, weak left , very lethargic, will not answer questions, barely opens eyes to stimulus and then immediately closes them  Skin: No rashes  ---------  LETHARGIC, AROUSABLE  IRREGULAR RHYTHM, REG RATE, S1/S1 NORMAL  GOOD AIR FLOW BILAT, BUT POOR INSPIRATORY EFFORTS, +RALES BILAT BASES, NO WHEEZING, RESP NON-LABORED  ABD SOFT/NT/ND + BS  NO LE EDEMA  CHRONIC RIGHT HEMIPARESIS    Results Reviewed:  I have personally reviewed current lab, radiology, and data and agree.      Results from last 7 days  Lab Units 12/01/17  1139   WBC 10*3/mm3 18.53*   HEMOGLOBIN g/dL 9.6*   HEMATOCRIT % 29.3*   PLATELETS 10*3/mm3 285       Results from last 7  days  Lab Units 12/01/17  1219   SODIUM mmol/L 133   POTASSIUM mmol/L 4.1   CHLORIDE mmol/L 101   CO2 mmol/L 22.0   BUN mg/dL 40*   CREATININE mg/dL 2.40*   GLUCOSE mg/dL 104*   CALCIUM mg/dL 8.4*   ALT (SGPT) U/L 6*   AST (SGOT) U/L 26     BNP   Date Value Ref Range Status   12/01/2017 1060.0 (H) 0.0 - 100.0 pg/mL Final     pH, Arterial   Date Value Ref Range Status   12/01/2017 7.399 7.350 - 7.450 pH units Final     Imaging Results (last 24 hours)     Procedure Component Value Units Date/Time    XR Chest 1 View [[id number]] Collected:  12/01/17 1330     Updated:  12/01/17 1330    Narrative:       EXAMINATION: XR CHEST 1 VW- 12/01/2017     INDICATION: shortness of air     COMPARISON: NONE     FINDINGS: Cardiomegaly is noted. Bibasilar airspace opacities are noted  which are ill-defined and irregular. There is mild nodularity and  congestive abnormalities in the hilar areas. The upper lung zones are  clear.           Impression:       1. Cardiomegaly is noted with ill-defined airspace linear nodular  opacities in the mid and lower lung zones with dense consolidation at  the bases, worse on the left than right.  2. Upper lung zones are otherwise clear.     D:  12/01/2017  E:  12/01/2017                   Assessment/Plan   Assessment / Plan     Hospital Problem List     * (Principal)Sepsis    HCAP (healthcare-associated pneumonia)    Urinary tract infection    Hypertension    Hyperlipidemia    GERD (gastroesophageal reflux disease)    CHF (congestive heart failure)    Depression    Gout    Hypothyroidism    Hemiparesis affecting right side as late effect of stroke    Atrial fibrillation    Supratherapeutic INR    Acute on chronic kidney failure    Anemia            Assessment & Plan:  --meets sepsis criteria given source of infection, hypotension, AMS, elevated procal, will treat for HCAP and UTI with Merrem, Vanc, and Doxy. Consult ID given her hx of MDR UTI. Gentle IVF, blood cultures  --unsure of baseline  Creatinine, give IVF, obtain urine studies, hold nephrotoxic medications  --BNP elevated and likely has component of CHF going on as well, will obtain ECHO, hold on diuresis as she is septic, does not clinically appear overloaded and has KATIE, will anchor mark, needs strict I&O  --INR too high to calculate, will give 5mg of Vitamin K now, discussed with pharmacy  --ABG normal, AMS likely related to sepsis  --anemia labs, no overt signs of bleeding  --restart home meds as appropriate    DVT prophylaxis: hold INR greater than 10    CODE STATUS:  Conditional Code    CASE DISCUSSED WITH APRN. PT IS VERY ILL WITH SEVERE SEPSIS SECONDARY TO MDR UTI/PNEUMONIA, POSS ASPIRATION, WITH ASOOCIATED HYPOTENSION (LIKELY DEHYDRATION AND SEPSIS RELATED) AND SIGNIFICANT COAGULOPATHY, INR > 100, SO WILL REVERSE WITH 5MG VIT K. SHE IS STARTED ON IV ABX AND ID CONSULTED. WILL PLACE MARK FOR STRICT I&O, CONSIDER DC ONCE IMPROVED. SLP TO EVAL ONCE MORE ALERT.    CURRENT CONDITION IS GUARDED.    Admission Status:  I believe this patient meets INPATIENT status due to the need for care which can only be reasonably provided in an hospital setting such as aggressive/expedited ancillary services and/or consultation services, the necessity for IV medications, close physician monitoring and/or the possible need for procedures.  In such, I feel patient’s risk for adverse outcomes and need for care warrant INPATIENT evaluation and predict the patient’s care encounter to likely last beyond 2 midnights.      Reina Elizondo, MOODY   12/01/17   5:13 PM

## 2017-12-01 NOTE — PLAN OF CARE
Problem: Patient Care Overview (Adult)  Goal: Plan of Care Review  Outcome: Ongoing (interventions implemented as appropriate)    12/01/17 1508   Coping/Psychosocial Response Interventions   Plan Of Care Reviewed With patient;daughter   Patient Care Overview   Progress no change         Problem: Pressure Ulcer (Adult)  Goal: Signs and Symptoms of Listed Potential Problems Will be Absent or Manageable (Pressure Ulcer)  Outcome: Ongoing (interventions implemented as appropriate)    12/01/17 1508   Pressure Ulcer   Problems Assessed (Pressure Ulcer) all   Problems Present (Pressure Ulcer) none         Problem: Fall Risk (Adult)  Goal: Identify Related Risk Factors and Signs and Symptoms  Outcome: Ongoing (interventions implemented as appropriate)    12/01/17 1508   Fall Risk   Fall Risk: Related Risk Factors age-related changes   Fall Risk: Signs and Symptoms presence of risk factors

## 2017-12-02 ENCOUNTER — APPOINTMENT (OUTPATIENT)
Dept: CARDIOLOGY | Facility: HOSPITAL | Age: 82
End: 2017-12-02

## 2017-12-02 LAB
ALBUMIN SERPL-MCNC: 3.3 G/DL (ref 3.2–4.8)
ALBUMIN/GLOB SERPL: 1.1 G/DL (ref 1.5–2.5)
ALP SERPL-CCNC: 49 U/L (ref 25–100)
ALT SERPL W P-5'-P-CCNC: 6 U/L (ref 7–40)
ANION GAP SERPL CALCULATED.3IONS-SCNC: 12 MMOL/L (ref 3–11)
AST SERPL-CCNC: 22 U/L (ref 0–33)
BASOPHILS # BLD AUTO: 0.01 10*3/MM3 (ref 0–0.2)
BASOPHILS NFR BLD AUTO: 0.1 % (ref 0–1)
BH CV ECHO MEAS - AO ROOT AREA (BSA CORRECTED): 2.1
BH CV ECHO MEAS - AO ROOT AREA: 9.3 CM^2
BH CV ECHO MEAS - AO ROOT DIAM: 3.4 CM
BH CV ECHO MEAS - BSA(HAYCOCK): 1.6 M^2
BH CV ECHO MEAS - BSA: 1.6 M^2
BH CV ECHO MEAS - BZI_BMI: 23.4 KILOGRAMS/M^2
BH CV ECHO MEAS - BZI_METRIC_HEIGHT: 160 CM
BH CV ECHO MEAS - BZI_METRIC_WEIGHT: 59.9 KG
BH CV ECHO MEAS - CONTRAST EF (2CH): 63.2 ML/M^2
BH CV ECHO MEAS - CONTRAST EF 4CH: 46.6 ML/M^2
BH CV ECHO MEAS - EDV(CUBED): 101.7 ML
BH CV ECHO MEAS - EDV(MOD-SP2): 68 ML
BH CV ECHO MEAS - EDV(MOD-SP4): 58 ML
BH CV ECHO MEAS - EDV(TEICH): 100.7 ML
BH CV ECHO MEAS - EF(CUBED): 65 %
BH CV ECHO MEAS - EF(MOD-SP2): 63.2 %
BH CV ECHO MEAS - EF(TEICH): 56.6 %
BH CV ECHO MEAS - ESV(CUBED): 35.5 ML
BH CV ECHO MEAS - ESV(MOD-SP2): 25 ML
BH CV ECHO MEAS - ESV(MOD-SP4): 31 ML
BH CV ECHO MEAS - ESV(TEICH): 43.7 ML
BH CV ECHO MEAS - FS: 29.6 %
BH CV ECHO MEAS - IVS/LVPW: 1.3
BH CV ECHO MEAS - IVSD: 1 CM
BH CV ECHO MEAS - LA DIMENSION: 3.2 CM
BH CV ECHO MEAS - LA/AO: 0.92
BH CV ECHO MEAS - LAT PEAK E' VEL: 5.5 CM/SEC
BH CV ECHO MEAS - LV DIASTOLIC VOL/BSA (35-75): 35.8 ML/M^2
BH CV ECHO MEAS - LV MASS(C)D: 138.2 GRAMS
BH CV ECHO MEAS - LV MASS(C)DI: 85.3 GRAMS/M^2
BH CV ECHO MEAS - LV MAX PG: 2.8 MMHG
BH CV ECHO MEAS - LV MEAN PG: 1.4 MMHG
BH CV ECHO MEAS - LV SYSTOLIC VOL/BSA (12-30): 19.1 ML/M^2
BH CV ECHO MEAS - LV V1 MAX: 82.9 CM/SEC
BH CV ECHO MEAS - LV V1 MEAN: 56.1 CM/SEC
BH CV ECHO MEAS - LV V1 VTI: 15.4 CM
BH CV ECHO MEAS - LVIDD: 4.7 CM
BH CV ECHO MEAS - LVIDS: 3.3 CM
BH CV ECHO MEAS - LVLD AP2: 6.2 CM
BH CV ECHO MEAS - LVLD AP4: 6 CM
BH CV ECHO MEAS - LVLS AP2: 4.9 CM
BH CV ECHO MEAS - LVLS AP4: 5.2 CM
BH CV ECHO MEAS - LVOT AREA (M): 2.8 CM^2
BH CV ECHO MEAS - LVOT AREA: 2.9 CM^2
BH CV ECHO MEAS - LVOT DIAM: 1.9 CM
BH CV ECHO MEAS - LVPWD: 0.77 CM
BH CV ECHO MEAS - MED PEAK E' VEL: 12.2 CM/SEC
BH CV ECHO MEAS - MV A MAX VEL: 45.4 CM/SEC
BH CV ECHO MEAS - MV E MAX VEL: 126.4 CM/SEC
BH CV ECHO MEAS - MV E/A: 2.8
BH CV ECHO MEAS - MV MAX PG: 9 MMHG
BH CV ECHO MEAS - MV MEAN PG: 1.9 MMHG
BH CV ECHO MEAS - MV V2 MAX: 150.3 CM/SEC
BH CV ECHO MEAS - MV V2 MEAN: 59.3 CM/SEC
BH CV ECHO MEAS - MV V2 VTI: 35.1 CM
BH CV ECHO MEAS - MVA(VTI): 1.3 CM^2
BH CV ECHO MEAS - PA ACC SLOPE: 520.4 CM/SEC^2
BH CV ECHO MEAS - PA ACC TIME: 0.13 SEC
BH CV ECHO MEAS - PA PR(ACCEL): 18.4 MMHG
BH CV ECHO MEAS - RAP SYSTOLE: 8 MMHG
BH CV ECHO MEAS - RVDD: 2.4 CM
BH CV ECHO MEAS - RVSP: 55 MMHG
BH CV ECHO MEAS - SI(CUBED): 40.8 ML/M^2
BH CV ECHO MEAS - SI(LVOT): 27.9 ML/M^2
BH CV ECHO MEAS - SI(MOD-SP2): 26.5 ML/M^2
BH CV ECHO MEAS - SI(MOD-SP4): 16.7 ML/M^2
BH CV ECHO MEAS - SI(TEICH): 35.2 ML/M^2
BH CV ECHO MEAS - SV(CUBED): 66.1 ML
BH CV ECHO MEAS - SV(LVOT): 45.2 ML
BH CV ECHO MEAS - SV(MOD-SP2): 43 ML
BH CV ECHO MEAS - SV(MOD-SP4): 27 ML
BH CV ECHO MEAS - SV(TEICH): 57 ML
BH CV ECHO MEAS - TAPSE (>1.6): 2.3 CM2
BH CV ECHO MEAS - TR MAX VEL: 341.3 CM/SEC
BH CV VAS BP RIGHT ARM: NORMAL MMHG
BH CV XLRA - RV BASE: 4.7 CM
BH CV XLRA - RV LENGTH: 5.4 CM
BH CV XLRA - RV MID: 3.5 CM
BH CV XLRA - TDI S': 13.4 CM/SEC
BILIRUB SERPL-MCNC: 0.4 MG/DL (ref 0.3–1.2)
BNP SERPL-MCNC: 463 PG/ML (ref 0–100)
BUN BLD-MCNC: 46 MG/DL (ref 9–23)
BUN/CREAT SERPL: 21.9 (ref 7–25)
CALCIUM SPEC-SCNC: 8 MG/DL (ref 8.7–10.4)
CHLORIDE SERPL-SCNC: 101 MMOL/L (ref 99–109)
CO2 SERPL-SCNC: 20 MMOL/L (ref 20–31)
CREAT BLD-MCNC: 2.1 MG/DL (ref 0.6–1.3)
CRP SERPL-MCNC: 40.55 MG/DL (ref 0–1)
D-LACTATE SERPL-SCNC: 1.5 MMOL/L (ref 0.5–2)
DEPRECATED RDW RBC AUTO: 54.8 FL (ref 37–54)
E/E' RATIO: 18
EOSINOPHIL # BLD AUTO: 0.03 10*3/MM3 (ref 0–0.3)
EOSINOPHIL NFR BLD AUTO: 0.2 % (ref 0–3)
ERYTHROCYTE [DISTWIDTH] IN BLOOD BY AUTOMATED COUNT: 15.9 % (ref 11.3–14.5)
ERYTHROCYTE [SEDIMENTATION RATE] IN BLOOD: 88 MM/HR (ref 0–30)
GFR SERPL CREATININE-BSD FRML MDRD: 22 ML/MIN/1.73
GLOBULIN UR ELPH-MCNC: 2.9 GM/DL
GLUCOSE BLD-MCNC: 74 MG/DL (ref 70–100)
HCT VFR BLD AUTO: 32.4 % (ref 34.5–44)
HGB BLD-MCNC: 10.5 G/DL (ref 11.5–15.5)
IMM GRANULOCYTES # BLD: 0.06 10*3/MM3 (ref 0–0.03)
IMM GRANULOCYTES NFR BLD: 0.3 % (ref 0–0.6)
INR PPP: 1.29
LEFT ATRIUM VOLUME INDEX: 77.2 ML/M2
LV EF 2D ECHO EST: 55 %
LYMPHOCYTES # BLD AUTO: 1.16 10*3/MM3 (ref 0.6–4.8)
LYMPHOCYTES NFR BLD AUTO: 6.7 % (ref 24–44)
MAGNESIUM SERPL-MCNC: 2.3 MG/DL (ref 1.3–2.7)
MAXIMAL PREDICTED HEART RATE: 126 BPM
MCH RBC QN AUTO: 30.9 PG (ref 27–31)
MCHC RBC AUTO-ENTMCNC: 32.4 G/DL (ref 32–36)
MCV RBC AUTO: 95.3 FL (ref 80–99)
MONOCYTES # BLD AUTO: 0.87 10*3/MM3 (ref 0–1)
MONOCYTES NFR BLD AUTO: 5 % (ref 0–12)
NEUTROPHILS # BLD AUTO: 15.12 10*3/MM3 (ref 1.5–8.3)
NEUTROPHILS NFR BLD AUTO: 87.7 % (ref 41–71)
PHOSPHATE SERPL-MCNC: 4.9 MG/DL (ref 2.4–5.1)
PLATELET # BLD AUTO: 294 10*3/MM3 (ref 150–450)
PMV BLD AUTO: 9.6 FL (ref 6–12)
POTASSIUM BLD-SCNC: 3.8 MMOL/L (ref 3.5–5.5)
PROCALCITONIN SERPL-MCNC: 12.54 NG/ML
PROT SERPL-MCNC: 6.2 G/DL (ref 5.7–8.2)
PROTHROMBIN TIME: 14.2 SECONDS (ref 9.6–11.5)
RBC # BLD AUTO: 3.4 10*6/MM3 (ref 3.89–5.14)
SODIUM BLD-SCNC: 133 MMOL/L (ref 132–146)
STRESS TARGET HR: 107 BPM
WBC NRBC COR # BLD: 17.25 10*3/MM3 (ref 3.5–10.8)

## 2017-12-02 PROCEDURE — 86140 C-REACTIVE PROTEIN: CPT | Performed by: PHYSICIAN ASSISTANT

## 2017-12-02 PROCEDURE — 25010000002 SULFUR HEXAFLUORIDE MICROSPH 60.7-25 MG RECONSTITUTED SUSPENSION: Performed by: INTERNAL MEDICINE

## 2017-12-02 PROCEDURE — 99233 SBSQ HOSP IP/OBS HIGH 50: CPT | Performed by: INTERNAL MEDICINE

## 2017-12-02 PROCEDURE — 85025 COMPLETE CBC W/AUTO DIFF WBC: CPT | Performed by: NURSE PRACTITIONER

## 2017-12-02 PROCEDURE — 25010000002 CEFEPIME PER 500 MG: Performed by: PHYSICIAN ASSISTANT

## 2017-12-02 PROCEDURE — 93306 TTE W/DOPPLER COMPLETE: CPT | Performed by: INTERNAL MEDICINE

## 2017-12-02 PROCEDURE — 84145 PROCALCITONIN (PCT): CPT | Performed by: NURSE PRACTITIONER

## 2017-12-02 PROCEDURE — 92610 EVALUATE SWALLOWING FUNCTION: CPT

## 2017-12-02 PROCEDURE — 85610 PROTHROMBIN TIME: CPT | Performed by: NURSE PRACTITIONER

## 2017-12-02 PROCEDURE — 83735 ASSAY OF MAGNESIUM: CPT | Performed by: NURSE PRACTITIONER

## 2017-12-02 PROCEDURE — 84100 ASSAY OF PHOSPHORUS: CPT | Performed by: NURSE PRACTITIONER

## 2017-12-02 PROCEDURE — 83605 ASSAY OF LACTIC ACID: CPT | Performed by: NURSE PRACTITIONER

## 2017-12-02 PROCEDURE — 80053 COMPREHEN METABOLIC PANEL: CPT | Performed by: NURSE PRACTITIONER

## 2017-12-02 PROCEDURE — 93306 TTE W/DOPPLER COMPLETE: CPT

## 2017-12-02 PROCEDURE — 85652 RBC SED RATE AUTOMATED: CPT | Performed by: PHYSICIAN ASSISTANT

## 2017-12-02 PROCEDURE — 83880 ASSAY OF NATRIURETIC PEPTIDE: CPT | Performed by: NURSE PRACTITIONER

## 2017-12-02 RX ORDER — WARFARIN SODIUM 1 MG/1
1 TABLET ORAL
Status: DISCONTINUED | OUTPATIENT
Start: 2017-12-02 | End: 2017-12-04

## 2017-12-02 RX ADMIN — WATER 1 G: 1 INJECTION INTRAMUSCULAR; INTRAVENOUS; SUBCUTANEOUS at 11:21

## 2017-12-02 RX ADMIN — SULFUR HEXAFLUORIDE 3 ML: KIT at 11:00

## 2017-12-02 RX ADMIN — WARFARIN SODIUM 1 MG: 1 TABLET ORAL at 17:51

## 2017-12-02 RX ADMIN — METRONIDAZOLE 500 MG: 500 INJECTION, SOLUTION INTRAVENOUS at 23:45

## 2017-12-02 RX ADMIN — METRONIDAZOLE 500 MG: 500 INJECTION, SOLUTION INTRAVENOUS at 05:56

## 2017-12-02 RX ADMIN — DOXYCYCLINE 100 MG: 100 INJECTION, POWDER, LYOPHILIZED, FOR SOLUTION INTRAVENOUS at 18:05

## 2017-12-02 RX ADMIN — METRONIDAZOLE 500 MG: 500 INJECTION, SOLUTION INTRAVENOUS at 14:58

## 2017-12-02 RX ADMIN — DIGOXIN 125 MCG: 125 TABLET ORAL at 14:58

## 2017-12-02 RX ADMIN — MIRTAZAPINE 7.5 MG: 15 TABLET, FILM COATED ORAL at 20:13

## 2017-12-02 NOTE — PROGRESS NOTES
"Pharmacy Consult  -  Warfarin    Katie Crocker is a 89 y.o. female   Height - 63\" (160 cm)  Weight - 132 lb (59.9 kg)    Consulting Provider: - MOODY Ruelas  Indication: - afib  Goal INR: - 2-3  Home Regimen:   - 2 mg on fridays   - 3 mg all other days    Drug-Drug Interactions with current regimen:   1. Doxycycline, merrem, vanc, flagyl    Warfarin Dosing During Admission:    Date  12/1 12/2          INR  aptt too high to measure inr 1.29          Dose  ----- 1 mg               Labs:    Results from last 7 days   Lab Units 12/02/17  0540 12/02/17  0529 12/01/17  1538 12/01/17  1139   INR  --  1.29 --  --    APTT seconds --  --  89.3* --    HEMOGLOBIN g/dL 10.5* --  --  9.6*   HEMATOCRIT % 32.4* --  --  29.3*   PLATELETS 10*3/mm3 294 --  --  285     Results from last 7 days   Lab Units 12/02/17  0529 12/01/17  1219   SODIUM mmol/L 133 133   POTASSIUM mmol/L 3.8 4.1   CHLORIDE mmol/L 101 101   CO2 mmol/L 20.0 22.0   BUN mg/dL 46* 40*   CREATININE mg/dL 2.10* 2.40*   CALCIUM mg/dL 8.0* 8.4*   BILIRUBIN mg/dL 0.4 0.3   ALK PHOS U/L 49 51   ALT (SGPT) U/L 6* 6*   AST (SGOT) U/L 22 26   GLUCOSE mg/dL 74 104*       Current dietary intake: none documented      Assessment/Plan:   Patient was given Vitamin K 5mg IV x 1 12/1 @ 1742.  Will resume warfarin cautiously at 1mg daily due to age, supratherapeutic admit INR, and major drug interaction with flagyl.  Pharmacy will follow closely and adjust as needed based on daily PT/INR.    Thank you,  Ita Garza, PharmD  12/2/2017  11:04 AM        "

## 2017-12-02 NOTE — THERAPY EVALUATION
Acute Care - Speech Language Pathology   Swallow Re-Evaluation [facility]   Fiberoptic Endoscopic Evaluation of Swallowing (FEES)       Patient Name: Katie Crocker  : 3/1/1923  MRN: [id number]  Today's Date: 2017               Admit Date: 2017    Visit Dx:     ICD-10-CM ICD-9-CM   1. Acute febrile illness R50.9 780.60   2. Pneumonia of both lower lobes due to infectious organism J18.9 483.8   3. Hypervolemia, unspecified hypervolemia type E87.70 276.69   4. Acute on chronic congestive heart failure, unspecified congestive heart failure type I50.9 428.0   5. Urinary tract infection without hematuria, site unspecified N39.0 599.0   6. Hypoxemia R09.02 799.02   7. Permanent atrial fibrillation I48.2 427.31     Patient Active Problem List   Diagnosis   • Sepsis   • HCAP (healthcare-associated pneumonia)   • Urinary tract infection   • Hypertension   • Hyperlipidemia   • GERD (gastroesophageal reflux disease)   • CHF (congestive heart failure)   • Depression   • Gout   • Hypothyroidism   • Hemiparesis affecting right side as late effect of stroke   • Atrial fibrillation   • Supratherapeutic INR   • Acute on chronic kidney failure   • Anemia     Past Medical History:   Diagnosis Date   • Bipolar disorder    • CHF (congestive heart failure)    • Dehydration    • Depression    • Disease of thyroid gland    • Diverticulosis    • GERD (gastroesophageal reflux disease)    • Gout    • Hyperkalemia    • Hyperlipidemia    • Hypertension    • Insomnia    • Pneumonia    • Renal disorder    • Sepsis    • Stroke    • SVT (supraventricular tachycardia)    • TIA (transient ischemic attack)    • UTI (urinary tract infection)      Past Surgical History:   Procedure Laterality Date   • BACK SURGERY     • HYSTERECTOMY     • REPLACEMENT TOTAL KNEE BILATERAL            SWALLOW EVALUATION (last 72 hours)      Swallow Evaluation       17 1145 17 1530             Rehab Evaluation    Document Type evaluation   -LS evaluation  -SM       Subjective Information no complaints;agree to therapy  -LS decreased LOC  -SM       General Information    Patient Profile Review yes  -LS yes  -SM       Subjective Patient Observations increased alertness and speech, near baseline per RN  -LS Lethargic, not arousing with tactile attempts  -SM       Pertinent History Of Current Problem PNA, UTI  -LS B PNA, UTI  -SM       Current Diet Limitations NPO  -LS NPO  -SM       Prior Level of Function- Swallowing other (comment)   mech soft or pureed per chart  -LS diet modifications- foods;other (comment)   mech-soft per dtr, hx period of puree  -       Plans/Goals Discussed With family;agreed upon  - family;agreed upon  -       Barriers to Rehab medically complex  -LS medically complex  -       Clinical Impression    Patient's Goals For Discharge --   requesting water  -LS        Family Goals For Discharge patient able to eat/drink without coughing/choking  - patient able to return to PO diet;patient able to eat/drink without coughing/choking  -SM       Rehab Potential/Prognosis, Swallowing good, to achieve stated therapy goals  -LS        SLP Diet Recommendation NPO: unsafe for food/liquid intake  - NPO: unsafe for food/liquid intake  -       Recommended Diagnostics FEES  -LS reassess via clinical swallow (non-instrumental exam)   when more alert  -SM       SLP Rec. for Method of Medication Administration meds via alternate route;meds crushed in pudding/applesauce  - --   will defer decision to MD as no PO trialed today  -       Pain Assessment    Pain Assessment No/denies pain  -LS Unable to assess  -       Oral Motor Structure and Function    Oral Motor Anatomy and Physiology patient demonstrates anatomy and physiology that is WNL  -LS        Dentition Assessment present and adequate  -LS        Secretion Management WNL/WFL  -LS        Volitional Swallow no difficulties initiating volitional swallow  -        Clinical  Swallow Exam    Mode of Presentation fed by clinician;cup;spoon;straw  -LS        Oral Phase Results intact oral phase without signs of dysfunction  -LS        Pharyngeal Phase Results sign/symptoms of pharyngeal impairment;cough  -LS        Summary of Clinical Exam R/O pharyngeal dysphagia. Cough at baseline. Increased alertness that RN reports is near baseline. Pt presents w/ s/s of asp c/b cough w/ thins via spoon and straw; and cough w/ nectar thick. No overt s/s of asp w/ pureed. SLP provided edu to the family. REC: NPO until FEES on 12/3; meds via alt route or crushed in pureed.   -LS Dysphagia: SLP in room observing pt interaction with NP. Pt not arousing to her attempts. NP conveys not safe for SLP to trial any PO. Spoke with dtr who conveys hx of both puree and mech-soft diet (recently mech-soft); has never had alterations needed to liquids. SLP will check back in AM for appropriateness for dysphagia eval. Will defer PO decisions, including meds, to MD until then. Thanks   -SM         User Key  (r) = Recorded By, (t) = Taken By, (c) = Cosigned By    Initials Name Effective Dates    SM Otilia Ivy, MS CCC-SLP 06/22/15 -     LS Mis Alcocer, MS [facility]-SLP 06/22/15 -         EDUCATION  The patient has been educated in the following areas:   Dysphagia (Swallowing Impairment) Oral Care/Hydration NPO rationale.    SLP Recommendation and Plan     SLP Diet Recommendation: NPO: unsafe for food/liquid intake     SLP Rec. for Method of Medication Administration: meds via alternate route, meds crushed in pudding/applesauce     Recommended Diagnostics: FEES        Rehab Potential/Prognosis, Swallowing: good, to achieve stated therapy goals                Plan of Care Review  Plan Of Care Reviewed With: patient, family  Progress: progress towards functional goals is fair  Outcome Summary/Follow up Plan: Clinical swallow eval complete. R/O pharyngeal dysphagia. Cough at baseline. Increased alertness that RN  reports is near baseline. Pt presents w/ s/s of asp c/b cough w/ thins via spoon and straw; and cough w/ nectar thick. No overt s/s of asp w/ pureed. SLP provided edu to the family. REC: NPO until FEES on 12/3; meds via alt route or crushed in pureed.              Time Calculation:         Time Calculation- SLP       12/02/17 1239          Time Calculation- SLP    SLP Start Time 1145  -      SLP Received On 12/02/17  -        User Key  (r) = Recorded By, (t) = Taken By, (c) = Cosigned By    Initials Name Provider Type     Mis Alcocer MS CCC-SLP Speech and Language Pathologist          Therapy Charges for Today     Code Description Service Date Service Provider Modifiers Qty    [id number] HC ST EVAL ORAL PHARYNG SWALLOW 3 12/2/2017 Mis Alcocer, MS CCC-SLP GN 1               Mis Alcocer MS CCC-SLP  12/2/2017

## 2017-12-02 NOTE — PROGRESS NOTES
[facility] Medicine Services  PROGRESS NOTE    Patient Name: Katie Crocker  : 3/1/1923  MRN: [id number]    Date of Admission: 2017  Length of Stay: 1  Primary Care Physician: Jose Armando Sharif MD    Subjective   Subjective     CC:fever    Resting in bed in no acute distress and tells me that she feels better compared to when she came in.  Denies any fever or chills currently.  No chest pain or shortness of breath.  No nausea, vomiting, diarrhea.    Review of Systems    Otherwise ROS is negative except as mentioned above.    Objective   Objective     Vital Signs:   Temp:  [97.8 °F (36.6 °C)-98.8 °F (37.1 °C)] 98.8 °F (37.1 °C)  Heart Rate:  [] 80  Resp:  [20-24] 20  BP: ()/(43-75) 116/43        Physical Exam:  Constitutional: No acute distress, awake, alert  Eyes: PERRLA, sclerae anicteric, no conjunctival injection  HENT: NCAT, mucous membranes moist  Neck: Supple, no thyromegaly, no lymphadenopathy, trachea midline  Respiratory: Clear to auscultation bilaterally, nonlabored respirations   Cardiovascular: RRR, no murmurs, rubs, or gallops, palpable pedal pulses bilaterally  Gastrointestinal: Positive bowel sounds, soft, nontender, nondistended  Musculoskeletal: No bilateral ankle edema, no clubbing or cyanosis to extremities  Psychiatric: Appropriate affect, cooperative  Neurologic: Awake, alert, follows commands, left-sided weakness.    Skin: No rashes    Results Reviewed:  I have personally reviewed current lab, radiology, and data and agree.      Results from last 7 days  Lab Units 17  0540 17  0529 17  1139   WBC 10*3/mm3 17.25*  --  18.53*   HEMOGLOBIN g/dL 10.5*  --  9.6*   HEMATOCRIT % 32.4*  --  29.3*   PLATELETS 10*3/mm3 294  --  285   INR   --  1.29  --        Results from last 7 days  Lab Units 17  0529 17  1219   SODIUM mmol/L 133 133   POTASSIUM mmol/L 3.8 4.1   CHLORIDE mmol/L 101 101   CO2 mmol/L 20.0 22.0   BUN mg/dL  46* 40*   CREATININE mg/dL 2.10* 2.40*   GLUCOSE mg/dL 74 104*   CALCIUM mg/dL 8.0* 8.4*   ALT (SGPT) U/L 6* 6*   AST (SGOT) U/L 22 26     BNP   Date Value Ref Range Status   12/02/2017 463.0 (H) 0.0 - 100.0 pg/mL Final     pH, Arterial   Date Value Ref Range Status   12/01/2017 7.399 7.350 - 7.450 pH units Final       Microbiology Results Abnormal     Procedure Component Value - Date/Time    Blood Culture - Blood, [[id number]]  (Normal) Collected:  12/01/17 1148    Lab Status:  Preliminary result Specimen:  Blood from Arm, Left Updated:  12/02/17 1231     Blood Culture No growth at 24 hours    Blood Culture - Blood, [[id number]]  (Normal) Collected:  12/01/17 1140    Lab Status:  Preliminary result Specimen:  Blood from Arm, Right Updated:  12/02/17 1231     Blood Culture No growth at 24 hours    Urine Culture - Urine, Urine, Clean Catch [[id number]]  (Abnormal) Collected:  12/01/17 1137    Lab Status:  Preliminary result Specimen:  Urine from Urine, Catheter Updated:  12/02/17 0831     Urine Culture --      >100,000 CFU/mL Gram Negative Bacilli (A)    Eosinophil Smear - Urine, Urine, Clean Catch [[id number]]  (Normal) Collected:  12/01/17 1702    Lab Status:  Final result Specimen:  Urine from Urine, Clean Catch Updated:  12/01/17 2137     Eosinophil Smear 0 % EOS/100 Cells     Narrative:       No eosinophil seen    Influenza A & B, RT PCR - Swab, Nasopharynx [[id number]]  (Normal) Collected:  12/01/17 1703    Lab Status:  Final result Specimen:  Swab from Nasopharynx Updated:  12/01/17 1820     Influenza A PCR Not Detected     Influenza B PCR Not Detected          Imaging Results (last 24 hours)     Procedure Component Value Units Date/Time    XR Chest 1 View [[id number]] Collected:  12/01/17 1330     Updated:  12/01/17 1741    Narrative:       EXAMINATION: XR CHEST 1 VW- 12/01/2017     INDICATION: shortness of air     COMPARISON: NONE     FINDINGS: Cardiomegaly is noted. Bibasilar airspace opacities are noted  which  are ill-defined and irregular. There is mild nodularity and  congestive abnormalities in the hilar areas. The upper lung zones are  clear.           Impression:       1. Cardiomegaly is noted with ill-defined airspace linear nodular  opacities in the mid and lower lung zones with dense consolidation at  the bases, worse on the left than right.  2. Upper lung zones are otherwise clear.     D:  12/01/2017  E:  12/01/2017     This report was finalized on 12/1/2017 5:39 PM by Dr. Blue Gallo MD.                I have reviewed the medications.    Assessment/Plan   Assessment / Plan     Hospital Problem List     * (Principal)Sepsis    HCAP (healthcare-associated pneumonia)    Urinary tract infection    Hypertension    Hyperlipidemia    GERD (gastroesophageal reflux disease)    CHF (congestive heart failure)    Depression    Gout    Hypothyroidism    Hemiparesis affecting right side as late effect of stroke    Atrial fibrillation    Supratherapeutic INR    Acute on chronic kidney failure    Anemia             Brief Hospital Course to date:  Katie Crocker is a 89 y.o. female with past medical history significant for hypertension, hyperlipidemia, hypothyroidism, atrial fibrillation, history of CVA, chronic kidney disease.  The patient was admitted for altered mental status and she was found to have pneumonia and UTI at the emergency room.      Assessment & Plan:  *HCAP with CXR evidence of consolidation , on antibiotics and currently afebrile.    * UTI    * Sepsis secondary to above    * Altered mental status secondary to sepsis.  Improving.    * Dysphagia.  Patient failed swallow test twice    * Hypertension.    * Congestive heart failure details unknown.  PLAN:  - And tinea antibiotic treatment.  - Echocardiogram.  --Continue home medication.  _ Is in a.m.       DVT Prophylaxis:      CODE STATUS: Conditional Code    Disposition: TBD.    Azar Olson MD  12/02/17  4:51 PM

## 2017-12-02 NOTE — PLAN OF CARE
Problem: Patient Care Overview (Adult)  Goal: Plan of Care Review  Outcome: Ongoing (interventions implemented as appropriate)    12/02/17 0455   Coping/Psychosocial Response Interventions   Plan Of Care Reviewed With patient   Patient Care Overview   Progress progress toward functional goals is gradual   Outcome Evaluation   Outcome Summary/Follow up Plan Dysphagia SLP today a second one. Pt arouses to voice o2 sats 93%. Will contuinue to monitor         Problem: Pressure Ulcer (Adult)  Goal: Signs and Symptoms of Listed Potential Problems Will be Absent or Manageable (Pressure Ulcer)  Outcome: Ongoing (interventions implemented as appropriate)

## 2017-12-02 NOTE — PLAN OF CARE
Problem: Patient Care Overview (Adult)  Goal: Plan of Care Review  Outcome: Ongoing (interventions implemented as appropriate)    12/02/17 1237   Coping/Psychosocial Response Interventions   Plan Of Care Reviewed With patient;family   Patient Care Overview   Progress progress towards functional goals is fair   Outcome Evaluation   Outcome Summary/Follow up Plan Clinical swallow eval complete. R/O pharyngeal dysphagia. Cough at baseline. Increased alertness that RN reports is near baseline. Pt presents w/ s/s of asp c/b cough w/ thins via spoon and straw; and cough w/ nectar thick. No overt s/s of asp w/ pureed. SLP provided edu to the family. REC: NPO until FEES on 12/3; meds via alt route or crushed in pureed.

## 2017-12-02 NOTE — PROGRESS NOTES
[facility] Progress Note    Admission Date: 2017    Katie Crocker  3/1/1923  [id number]    Date: 2017    Meds:    IV Anti-Infectives     Ordered     Dose/Rate Route Frequency Start Stop    17  metroNIDAZOLE (FLAGYL) IVPB 500 mg     Ordering Provider:  Alvaro Antonio MD    500 mg  100 mL/hr over 60 Minutes Intravenous Every 8 Hours 17 2200 17 2159    17 1833  cefepime (MAXIPIME) in SWFI 1g/10ml IV PUSH syringe     Ordering Provider:  EVETTE Pepper    1 g  over 5 Minutes Intravenous Every 24 Hours Scheduled 17 1930 17 0859    17 1833  DAPTOmycin (CUBICIN)in SWFI IV PUSH syringe     Ordering Provider:  EVETTE Pepper    250 mg  over 2 Minutes Intravenous Every 48 Hours 17 1915 17 2000    17 1651  doxycycline (VIBRAMYCIN) 100 mg in sodium chloride 0.9 % 250 mL IVPB     Ordering Provider:  Jerry Adames MD    100 mg  over 60 Minutes Intravenous Every 12 Hours 17 1800 17 1759    17 1553  meropenem (MERREM) 1 g/100 mL 0.9% NS VTB (mbp)     Ordering Provider:  MOODY Ruelas    1 g  over 30 Minutes Intravenous Once 17 1630 17 1736    17 1142  vancomycin 1250 mg/250 mL 0.9% NS IVPB ([facility])     Ordering Provider:  Jacky Condon MD    20 mg/kg × 59.9 kg  over 90 Minutes Intravenous Once 17 1144 17 1444    17 1142  piperacillin-tazobactam (ZOSYN) 4.5 g in iso-osmotic dextrose 100 mL IVPB (premix)     Ordering Provider:  Jacky Condon MD    4.5 g Intravenous Once 17 1143 17 1233          CC:  Sepsis, pna, UTI    SUBJECTIVE:  More alert today.  Denies f/c, sob/cough, n/v/d, rashes.  Denies abdominal pain.   Denies dysuria.  Upon exam, she was sitting in watery diarrhea.    PE:   Vital Signs  Temp (24hrs), Av.3 °F (36.8 °C), Min:97.8 °F (36.6 °C), Max:98.8 °F (37.1 °C)    Temp  Min: 97.8 °F (36.6 °C)  Max: 98.8 °F (37.1 °C)  BP  Min: 97/62  Max:  128/75  Pulse  Min: 80  Max: 106  Resp  Min: 20  Max: 34  SpO2  Min: 89 %  Max: 95 %    GENERAL: Awake and alert, in no acute distress.   HEENT:  No conjunctival injection. No icterus. Oropharynx clear without evidence of thrush or exudate. [place]  NECK: Supple, no JVD     HEART: RRR; No murmur, rubs, gallops.   LUNGS: Diminished at lung bases bilaterally without wheezing, rales, rhonchi. Normal respiratory effort. Nonlabored. No dullness.  ABDOMEN: Soft, nontender, nondistended. Positive bowel sounds. No rebound or guarding. NO mass or HSM.  EXT:  No cyanosis, clubbing or edema. No cord.  : Genitalia generally unremarkable.  Self catheter with clear yellow urine.  SKIN: Warm and dry without cutaneous eruptions on Inspection/palpation.    NEURO: More alert, moves limbs    Laboratory Data      Results from last 7 days  Lab Units 12/02/17  0540 12/01/17  1139   WBC 10*3/mm3 17.25* 18.53*   HEMOGLOBIN g/dL 10.5* 9.6*   HEMATOCRIT % 32.4* 29.3*   PLATELETS 10*3/mm3 294 285       Results from last 7 days  Lab Units 12/02/17  0529   SODIUM mmol/L 133   POTASSIUM mmol/L 3.8   CHLORIDE mmol/L 101   CO2 mmol/L 20.0   BUN mg/dL 46*   CREATININE mg/dL 2.10*   GLUCOSE mg/dL 74   CALCIUM mg/dL 8.0*       Results from last 7 days  Lab Units 12/02/17  0529   ALK PHOS U/L 49   BILIRUBIN mg/dL 0.4   ALT (SGPT) U/L 6*   AST (SGOT) U/L 22       Results from last 7 days  Lab Units 12/02/17  0540   SED RATE mm/hr 88*       Results from last 7 days  Lab Units 12/02/17  0529   CRP mg/dL 40.55*       Results from last 7 days  Lab Units 12/02/17  0540   LACTATE mmol/L 1.5             Estimated Creatinine Clearance: 15.5 mL/min (by C-G formula based on Cr of 2.1).    Microbiology:  Blood Culture   Date Value Ref Range Status   12/01/2017 No growth at 24 hours  Preliminary                    Urine Culture   Date Value Ref Range Status   12/01/2017 >100,000 CFU/mL Gram Negative Bacilli (A)  Preliminary          Radiology:  Imaging Results  (last 72 hours)     Procedure Component Value Units Date/Time    XR Chest 1 View [[id number]] Collected:  12/01/17 1330     Updated:  12/01/17 1741    Narrative:       EXAMINATION: XR CHEST 1 VW- 12/01/2017     INDICATION: shortness of air     COMPARISON: NONE     FINDINGS: Cardiomegaly is noted. Bibasilar airspace opacities are noted  which are ill-defined and irregular. There is mild nodularity and  congestive abnormalities in the hilar areas. The upper lung zones are  clear.           Impression:       1. Cardiomegaly is noted with ill-defined airspace linear nodular  opacities in the mid and lower lung zones with dense consolidation at  the bases, worse on the left than right.  2. Upper lung zones are otherwise clear.     D:  12/01/2017  E:  12/01/2017     This report was finalized on 12/1/2017 5:39 PM by Dr. Blue Gallo MD.           Discussion:   89 yo female was admitted 12/1 with sepsis, pneumonia, and UTI for IV antibiotics.  She has a h/o recurrent pneumonia and MDR UTIs.  A urine culture from 5/4/15 was positive for E. coli at [facility], VRE at [facility], MDR E. coli at [facility].      Problem List:   --Severe sepsis POA with ARF, leukocytosis, fever, encephalopathy, and elevated procalcitonin.  --Acute complicated recurrent UTI, cx prelim GNR.  H/o MDR UTIs; including MDR E. Coli and VRE at [facility] in 2015 - I reviewed outside records.  --Basilar pneumonia  --ARF  --Leukocytosis/neutrophilia - improved  --Fever - improved  --Elevated procalcitonin  --Acute encephalopathy, probably metabolic.  --Acute probable systolic CHF  --Hypothyroidism  --Bipolar disorder  --H/o CVA  --h/o chronic coumadin       PLAN/RECOMMENDATIONS:   --Monitor cultures, labs, UMU  Continue:  --Cefepime 1 GM IV daily  --Daptomycin 4 mg/kg IV Q48H for empiric VRE coverage given prior urine cultures - I do not see a reason to cover for MRSA pneumonia. May stop soon if no gram positives in urine cx.  --Doxycyline 100 mg IV BID for atypical coverage,  can change to PO when more alert.   --probiotic   --Flagyl 500 mg IV TID for anaerobic coverage with risk for aspiration of oral anaerobes, may also change to PO when tolerating.     Complicated case, prognosis guarded.     Alvaro Antonio MD saw and examined patient, verified hx and PE, read all radiographic studies, reviewed labs and micro data, and formulated dx, plan for treatment and all medical decision making.       EVETTE Pepper-C for MD Pola Hernandez PA  12/2/2017  1:19 PM

## 2017-12-03 LAB
ALBUMIN SERPL-MCNC: 3.4 G/DL (ref 3.2–4.8)
ALBUMIN/GLOB SERPL: 1 G/DL (ref 1.5–2.5)
ALP SERPL-CCNC: 61 U/L (ref 25–100)
ALT SERPL W P-5'-P-CCNC: 12 U/L (ref 7–40)
ANION GAP SERPL CALCULATED.3IONS-SCNC: 11 MMOL/L (ref 3–11)
AST SERPL-CCNC: 27 U/L (ref 0–33)
BACTERIA SPEC AEROBE CULT: ABNORMAL
BACTERIA SPEC AEROBE CULT: ABNORMAL
BASOPHILS # BLD AUTO: 0.02 10*3/MM3 (ref 0–0.2)
BASOPHILS NFR BLD AUTO: 0.2 % (ref 0–1)
BILIRUB SERPL-MCNC: 0.4 MG/DL (ref 0.3–1.2)
BUN BLD-MCNC: 40 MG/DL (ref 9–23)
BUN/CREAT SERPL: 25 (ref 7–25)
CALCIUM SPEC-SCNC: 9.1 MG/DL (ref 8.7–10.4)
CHLORIDE SERPL-SCNC: 111 MMOL/L (ref 99–109)
CO2 SERPL-SCNC: 19 MMOL/L (ref 20–31)
CREAT BLD-MCNC: 1.6 MG/DL (ref 0.6–1.3)
DEPRECATED RDW RBC AUTO: 53.5 FL (ref 37–54)
EOSINOPHIL # BLD AUTO: 0.03 10*3/MM3 (ref 0–0.3)
EOSINOPHIL NFR BLD AUTO: 0.2 % (ref 0–3)
ERYTHROCYTE [DISTWIDTH] IN BLOOD BY AUTOMATED COUNT: 15.7 % (ref 11.3–14.5)
GFR SERPL CREATININE-BSD FRML MDRD: 30 ML/MIN/1.73
GLOBULIN UR ELPH-MCNC: 3.5 GM/DL
GLUCOSE BLD-MCNC: 74 MG/DL (ref 70–100)
HCT VFR BLD AUTO: 28.6 % (ref 34.5–44)
HGB BLD-MCNC: 9.3 G/DL (ref 11.5–15.5)
IMM GRANULOCYTES # BLD: 0.09 10*3/MM3 (ref 0–0.03)
IMM GRANULOCYTES NFR BLD: 0.7 % (ref 0–0.6)
INR PPP: 1.26
LYMPHOCYTES # BLD AUTO: 0.87 10*3/MM3 (ref 0.6–4.8)
LYMPHOCYTES NFR BLD AUTO: 6.9 % (ref 24–44)
MAGNESIUM SERPL-MCNC: 2.3 MG/DL (ref 1.3–2.7)
MCH RBC QN AUTO: 30.7 PG (ref 27–31)
MCHC RBC AUTO-ENTMCNC: 32.5 G/DL (ref 32–36)
MCV RBC AUTO: 94.4 FL (ref 80–99)
MONOCYTES # BLD AUTO: 0.89 10*3/MM3 (ref 0–1)
MONOCYTES NFR BLD AUTO: 7.1 % (ref 0–12)
NEUTROPHILS # BLD AUTO: 10.64 10*3/MM3 (ref 1.5–8.3)
NEUTROPHILS NFR BLD AUTO: 84.9 % (ref 41–71)
PHOSPHATE SERPL-MCNC: 3.4 MG/DL (ref 2.4–5.1)
PLATELET # BLD AUTO: 270 10*3/MM3 (ref 150–450)
PMV BLD AUTO: 9.3 FL (ref 6–12)
POTASSIUM BLD-SCNC: 3.6 MMOL/L (ref 3.5–5.5)
PROT SERPL-MCNC: 6.9 G/DL (ref 5.7–8.2)
PROTHROMBIN TIME: 13.8 SECONDS (ref 9.6–11.5)
RBC # BLD AUTO: 3.03 10*6/MM3 (ref 3.89–5.14)
SODIUM BLD-SCNC: 141 MMOL/L (ref 132–146)
WBC NRBC COR # BLD: 12.54 10*3/MM3 (ref 3.5–10.8)

## 2017-12-03 PROCEDURE — 99233 SBSQ HOSP IP/OBS HIGH 50: CPT | Performed by: HOSPITALIST

## 2017-12-03 PROCEDURE — 92612 ENDOSCOPY SWALLOW (FEES) VID: CPT

## 2017-12-03 PROCEDURE — 25010000002 CEFEPIME PER 500 MG: Performed by: INTERNAL MEDICINE

## 2017-12-03 PROCEDURE — 85610 PROTHROMBIN TIME: CPT | Performed by: NURSE PRACTITIONER

## 2017-12-03 PROCEDURE — 25010000002 CEFEPIME PER 500 MG: Performed by: PHYSICIAN ASSISTANT

## 2017-12-03 PROCEDURE — 85025 COMPLETE CBC W/AUTO DIFF WBC: CPT | Performed by: INTERNAL MEDICINE

## 2017-12-03 PROCEDURE — 80053 COMPREHEN METABOLIC PANEL: CPT | Performed by: INTERNAL MEDICINE

## 2017-12-03 PROCEDURE — 84100 ASSAY OF PHOSPHORUS: CPT | Performed by: INTERNAL MEDICINE

## 2017-12-03 PROCEDURE — 83735 ASSAY OF MAGNESIUM: CPT | Performed by: INTERNAL MEDICINE

## 2017-12-03 RX ADMIN — WATER 1 G: 1 INJECTION INTRAMUSCULAR; INTRAVENOUS; SUBCUTANEOUS at 23:52

## 2017-12-03 RX ADMIN — WATER 1 G: 1 INJECTION INTRAMUSCULAR; INTRAVENOUS; SUBCUTANEOUS at 10:47

## 2017-12-03 RX ADMIN — METRONIDAZOLE 500 MG: 500 INJECTION, SOLUTION INTRAVENOUS at 05:14

## 2017-12-03 RX ADMIN — CITALOPRAM HYDROBROMIDE 20 MG: 20 TABLET ORAL at 10:47

## 2017-12-03 RX ADMIN — SODIUM CHLORIDE 75 ML/HR: 9 INJECTION, SOLUTION INTRAVENOUS at 10:44

## 2017-12-03 RX ADMIN — DOXYCYCLINE 100 MG: 100 INJECTION, POWDER, LYOPHILIZED, FOR SOLUTION INTRAVENOUS at 18:07

## 2017-12-03 RX ADMIN — DIGOXIN 125 MCG: 125 TABLET ORAL at 12:15

## 2017-12-03 RX ADMIN — MIRTAZAPINE 7.5 MG: 15 TABLET, FILM COATED ORAL at 21:20

## 2017-12-03 RX ADMIN — DOXYCYCLINE 100 MG: 100 INJECTION, POWDER, LYOPHILIZED, FOR SOLUTION INTRAVENOUS at 05:14

## 2017-12-03 RX ADMIN — Medication 1 CAPSULE: at 10:47

## 2017-12-03 RX ADMIN — WARFARIN SODIUM 1 MG: 1 TABLET ORAL at 18:07

## 2017-12-03 RX ADMIN — ATORVASTATIN CALCIUM 10 MG: 10 TABLET, FILM COATED ORAL at 10:46

## 2017-12-03 RX ADMIN — ALLOPURINOL 150 MG: 300 TABLET ORAL at 10:45

## 2017-12-03 RX ADMIN — METRONIDAZOLE 500 MG: 500 INJECTION, SOLUTION INTRAVENOUS at 21:20

## 2017-12-03 RX ADMIN — LEVOTHYROXINE SODIUM 50 MCG: 50 TABLET ORAL at 10:46

## 2017-12-03 RX ADMIN — DIVALPROEX SODIUM 500 MG: 500 TABLET, DELAYED RELEASE ORAL at 10:47

## 2017-12-03 RX ADMIN — METRONIDAZOLE 500 MG: 500 INJECTION, SOLUTION INTRAVENOUS at 16:10

## 2017-12-03 NOTE — PROGRESS NOTES
[facility] Medicine Services  PROGRESS NOTE    Patient Name: Katie Crocker  : 3/1/1923  MRN: [id number]    Date of Admission: 2017  Length of Stay: 2  Primary Care Physician: Jose Armando Sharif MD    Subjective   Subjective     CC:  Sepsis (present on admission)    Subjective:  Daughter, Radha - in room today.  Asking about case.  She is a nurse.  She drove in from [place] today.  Patient is very hard of hearing.  Poor mobility.  In a wheelchair mostly it appears.      Review of Systems    Otherwise ROS is negative except as mentioned above.    Objective   Objective     Vital Signs:   Temp:  [97.6 °F (36.4 °C)-99.4 °F (37.4 °C)] 99.4 °F (37.4 °C)  Heart Rate:  [] 108  Resp:  [16-18] 18  BP: (123-126)/(47-61) 126/61        Physical Exam:  Constitutional: No acute distress, awake, alert  Eyes: PERRLA, sclerae anicteric, no conjunctival injection  HENT: NCAT, mucous membranes moist  Neck: Supple, no thyromegaly, no lymphadenopathy, trachea midline  Respiratory: Clear to auscultation bilaterally, nonlabored respirations   Cardiovascular: RRR, no murmurs, rubs, or gallops, palpable pedal pulses bilaterally  Gastrointestinal: Positive bowel sounds, soft, nontender, nondistended  Musculoskeletal: No bilateral ankle edema, no clubbing or cyanosis to extremities  Psychiatric: Appropriate affect, cooperative  Neurologic: Awake, alert, follows commands, left-sided weakness.    Skin: No rashes    Results Reviewed:  I have personally reviewed current lab, radiology, and data and agree.      Results from last 7 days  Lab Units 17  0508 17  0540 17  0529 17  1139   WBC 10*3/mm3 12.54* 17.25*  --  18.53*   HEMOGLOBIN g/dL 9.3* 10.5*  --  9.6*   HEMATOCRIT % 28.6* 32.4*  --  29.3*   PLATELETS 10*3/mm3 270 294  --  285   INR  1.26  --  1.29  --        Results from last 7 days  Lab Units 17  0530 17  0529 17  1219   SODIUM mmol/L 141 133 133    POTASSIUM mmol/L 3.6 3.8 4.1   CHLORIDE mmol/L 111* 101 101   CO2 mmol/L 19.0* 20.0 22.0   BUN mg/dL 40* 46* 40*   CREATININE mg/dL 1.60* 2.10* 2.40*   GLUCOSE mg/dL 74 74 104*   CALCIUM mg/dL 9.1 8.0* 8.4*   ALT (SGPT) U/L 12 6* 6*   AST (SGOT) U/L 27 22 26     BNP   Date Value Ref Range Status   12/02/2017 463.0 (H) 0.0 - 100.0 pg/mL Final     pH, Arterial   Date Value Ref Range Status   12/01/2017 7.399 7.350 - 7.450 pH units Final       Microbiology Results Abnormal     Procedure Component Value - Date/Time    Blood Culture - Blood, [[id number]]  (Normal) Collected:  12/01/17 1148    Lab Status:  Preliminary result Specimen:  Blood from Arm, Left Updated:  12/03/17 1231     Blood Culture No growth at 2 days    Blood Culture - Blood, [[id number]]  (Normal) Collected:  12/01/17 1140    Lab Status:  Preliminary result Specimen:  Blood from Arm, Right Updated:  12/03/17 1231     Blood Culture No growth at 2 days    Urine Culture - Urine, Urine, Clean Catch [[id number]]  (Abnormal)  (Susceptibility) Collected:  12/01/17 1137    Lab Status:  Final result Specimen:  Urine from Urine, Catheter Updated:  12/03/17 1143     Urine Culture --      >100,000 CFU/mL Escherichia coli (A)    Susceptibility      Escherichia coli     MARCUS     Ampicillin >16 ug/ml Resistant     Ampicillin + Sulbactam >16/8 ug/ml Resistant     Aztreonam <=8 ug/ml Susceptible     Cefepime <=8 ug/ml Susceptible     Cefotaxime <=2 ug/ml Susceptible     Ceftriaxone <=8 ug/ml Susceptible     Cefuroxime sodium <=4 ug/ml Susceptible     Cephalothin 16 ug/ml Intermediate     Ertapenem <=1 ug/ml Susceptible     Gentamicin <=4 ug/ml Susceptible     Levofloxacin <=2 ug/ml Susceptible     Meropenem <=1 ug/ml Susceptible     Nitrofurantoin <=32 ug/ml Susceptible     Piperacillin + Tazobactam <=16 ug/ml Susceptible     Tetracycline 8 ug/ml Intermediate     Tobramycin <=4 ug/ml Susceptible     Trimethoprim + Sulfamethoxazole >2/38 ug/ml Resistant                     Eosinophil Smear - Urine, Urine, Clean Catch [[id number]]  (Normal) Collected:  12/01/17 1702    Lab Status:  Final result Specimen:  Urine from Urine, Clean Catch Updated:  12/01/17 2137     Eosinophil Smear 0 % EOS/100 Cells     Narrative:       No eosinophil seen    Influenza A & B, RT PCR - Swab, Nasopharynx [[id number]]  (Normal) Collected:  12/01/17 1703    Lab Status:  Final result Specimen:  Swab from Nasopharynx Updated:  12/01/17 1820     Influenza A PCR Not Detected     Influenza B PCR Not Detected          Imaging Results (last 24 hours)     ** No results found for the last 24 hours. **        Results for orders placed during the hospital encounter of 12/01/17   Adult Transthoracic Echo Complete W/ Cont if Necessary Per Protocol    Narrative · Left ventricular systolic function is normal. Estimated EF = 55%.  · Mild aortic valve regurgitation is present.  · Mild-to-moderate mitral valve regurgitation is present  · Moderate to severe tricuspid valve regurgitation is present.  · Calculated right ventricular systolic pressure from tricuspid   regurgitation is 55 mmHg.          I have reviewed the medications.    Assessment/Plan   Assessment / Plan     Hospital Problem List     * (Principal)Sepsis    HCAP (healthcare-associated pneumonia)    Urinary tract infection    Hypertension    Hyperlipidemia    GERD (gastroesophageal reflux disease)    CHF (congestive heart failure)    Depression    Gout    Hypothyroidism    Hemiparesis affecting right side as late effect of stroke    Atrial fibrillation    Supratherapeutic INR    Acute on chronic kidney failure    Anemia             Brief Hospital Course to date:  Katie Crocker is a 89 y.o. female with past medical history significant for hypertension, hyperlipidemia, hypothyroidism, atrial fibrillation, history of CVA, chronic kidney disease.  The patient was admitted for altered mental status and she was found to have pneumonia and UTI at the emergency  room.      Assessment & Plan:  * Severe Sepsis POA - improving - ID following - high procalcitonin on admission    *HCAP with CXR evidence of consolidation, on antibiotics and currently afebrile.    * UTI    * Altered mental status secondary to sepsis.  Improving.    * Dysphagia.  Patient failed swallow test twice    * Hypertension.    * Congestive heart failure details unknown    * Acute on Chronic Kidney Disease - improving - back to baseline now    * Supratherapeutic INR on admission - INR 1.26 today - restarting warfarin    PLAN:  - restarting warfarin  - Echocardiogram - EF - 55%  - discussed case with Daughter in room today - Radha - she is a nurse who drove in from [place] today     DVT Prophylaxis:  Anticoagulated with warfarin    CODE STATUS: Conditional Code    Disposition: TBD.    Dinesh Berumen MD  12/03/17  6:25 PM

## 2017-12-03 NOTE — PROGRESS NOTES
"Pharmacy Consult  -  Warfarin    Katie Crocker is a 89 y.o. female   Height - 63\" (160 cm)  Weight - 132 lb (59.9 kg)    Consulting Provider: - MOODY Ruelas  Indication: - afib  Goal INR: - 2-3  Home Regimen:   - 2 mg on fridays   - 3 mg all other days    Drug-Drug Interactions with current regimen:   1. Doxycycline, merrem, vanc, flagyl    Warfarin Dosing During Admission:    Date  12/1 12/2 12/3         INR  aptt too high to measure inr 1.29 1.26         Dose  ----- 1 mg 1 mg              Labs:    Results from last 7 days   Lab Units 12/03/17  0508 12/02/17  0540 12/02/17  0529 12/01/17  1538 12/01/17  1139   INR  1.26 --  1.29 --  --    APTT seconds --  --  --  89.3* --    HEMOGLOBIN g/dL 9.3* 10.5* --  --  9.6*   HEMATOCRIT % 28.6* 32.4* --  --  29.3*   PLATELETS 10*3/mm3 270 294 --  --  285     Results from last 7 days   Lab Units 12/03/17  0530 12/02/17  0529 12/01/17  1219   SODIUM mmol/L 141 133 133   POTASSIUM mmol/L 3.6 3.8 4.1   CHLORIDE mmol/L 111* 101 101   CO2 mmol/L 19.0* 20.0 22.0   BUN mg/dL 40* 46* 40*   CREATININE mg/dL 1.60* 2.10* 2.40*   CALCIUM mg/dL 9.1 8.0* 8.4*   BILIRUBIN mg/dL 0.4 0.4 0.3   ALK PHOS U/L 61 49 51   ALT (SGPT) U/L 12 6* 6*   AST (SGOT) U/L 27 22 26   GLUCOSE mg/dL 74 74 104*       Current dietary intake: none documented      Assessment/Plan:   Patient was given Vitamin K 5mg IV x 1 12/1 @ 6042.  Warfarin resumed cautiously at 1mg daily yesterday due to age, supratherapeutic admit INR, and major drug interaction with flagyl.  Will continue 1mg daily for now.  Pharmacy will follow closely and adjust as needed based on daily PT/INR.    Thank you,  Ita Pettinato, PharmD  12/3/2017  9:16 AM    "

## 2017-12-03 NOTE — PLAN OF CARE
Problem: Patient Care Overview (Adult)  Goal: Plan of Care Review  Outcome: Ongoing (interventions implemented as appropriate)    12/03/17 1452   Coping/Psychosocial Response Interventions   Plan Of Care Reviewed With patient;daughter;friend   Patient Care Overview   Progress progress toward functional goals as expected   Outcome Evaluation   Outcome Summary/Follow up Plan passed fees without restriction.cont with iv antibiotics.         Problem: Pressure Ulcer (Adult)  Goal: Signs and Symptoms of Listed Potential Problems Will be Absent or Manageable (Pressure Ulcer)  Outcome: Ongoing (interventions implemented as appropriate)    12/03/17 1452   Pressure Ulcer   Problems Assessed (Pressure Ulcer) all         Problem: Fall Risk (Adult)  Goal: Identify Related Risk Factors and Signs and Symptoms  Outcome: Ongoing (interventions implemented as appropriate)    12/03/17 1452   Fall Risk   Fall Risk: Related Risk Factors age-related changes;impaired vision;sleep pattern alteration;environment unfamiliar   Fall Risk: Signs and Symptoms presence of risk factors       Goal: Absence of Falls  Outcome: Ongoing (interventions implemented as appropriate)    12/03/17 1452   Fall Risk (Adult)   Absence of Falls making progress toward outcome

## 2017-12-03 NOTE — PLAN OF CARE
Problem: Patient Care Overview (Adult)  Goal: Plan of Care Review  Outcome: Ongoing (interventions implemented as appropriate)    12/02/17 1918   Coping/Psychosocial Response Interventions   Plan Of Care Reviewed With patient   Outcome Evaluation   Outcome Summary/Follow up Plan Pt. more alert today and is able to talk. She reports no discomfort and has been resting well         Problem: Pressure Ulcer (Adult)  Goal: Signs and Symptoms of Listed Potential Problems Will be Absent or Manageable (Pressure Ulcer)  Outcome: Ongoing (interventions implemented as appropriate)

## 2017-12-03 NOTE — PLAN OF CARE
Problem: Patient Care Overview (Adult)  Goal: Plan of Care Review  Outcome: Ongoing (interventions implemented as appropriate)    12/03/17 1214   Coping/Psychosocial Response Interventions   Plan Of Care Reviewed With patient;friend   Patient Care Overview   Progress improving   Outcome Evaluation   Outcome Summary/Follow up Plan FEES complete. Swallow is functional for pt's age. Mild delay in initiation but this did not impact safety. No penetration/aspiration. Mild oral residue with cracker that required liquid wash. Pt may resume PO diet - mech soft, thins, no further SLP f/u needed.

## 2017-12-03 NOTE — THERAPY EVALUATION
Acute Care - Speech Language Pathology   Swallow Initial Evaluation [facility]   Fiberoptic Endoscopic Evaluation of Swallowing (FEES)       Patient Name: Katie Crocker  : 3/1/1923  MRN: [id number]  Today's Date: 12/3/2017               Admit Date: 2017    Visit Dx:     ICD-10-CM ICD-9-CM   1. Acute febrile illness R50.9 780.60   2. Pneumonia of both lower lobes due to infectious organism J18.9 483.8   3. Hypervolemia, unspecified hypervolemia type E87.70 276.69   4. Acute on chronic congestive heart failure, unspecified congestive heart failure type I50.9 428.0   5. Urinary tract infection without hematuria, site unspecified N39.0 599.0   6. Hypoxemia R09.02 799.02   7. Permanent atrial fibrillation I48.2 427.31     Patient Active Problem List   Diagnosis   • Sepsis   • HCAP (healthcare-associated pneumonia)   • Urinary tract infection   • Hypertension   • Hyperlipidemia   • GERD (gastroesophageal reflux disease)   • CHF (congestive heart failure)   • Depression   • Gout   • Hypothyroidism   • Hemiparesis affecting right side as late effect of stroke   • Atrial fibrillation   • Supratherapeutic INR   • Acute on chronic kidney failure   • Anemia     Past Medical History:   Diagnosis Date   • Bipolar disorder    • CHF (congestive heart failure)    • Dehydration    • Depression    • Disease of thyroid gland    • Diverticulosis    • GERD (gastroesophageal reflux disease)    • Gout    • Hyperkalemia    • Hyperlipidemia    • Hypertension    • Insomnia    • Pneumonia    • Renal disorder    • Sepsis    • Stroke    • SVT (supraventricular tachycardia)    • TIA (transient ischemic attack)    • UTI (urinary tract infection)      Past Surgical History:   Procedure Laterality Date   • BACK SURGERY     • HYSTERECTOMY     • REPLACEMENT TOTAL KNEE BILATERAL            SWALLOW EVALUATION (last 72 hours)      Swallow Evaluation       17 1200 17 1145 17 1530          Rehab Evaluation    Document  Type evaluation  -AW evaluation  -LS evaluation  -SM      Subjective Information no complaints  -AW no complaints;agree to therapy  -LS decreased LOC  -SM      General Information    Patient Profile Review yes  -AW yes  -LS yes  -SM      Subjective Patient Observations alert, cooperative  -AW increased alertness and speech, near baseline per RN  -LS Lethargic, not arousing with tactile attempts  -SM      Pertinent History Of Current Problem  PNA, UTI  -LS B PNA, UTI  -SM      Current Diet Limitations NPO  -AW NPO  -LS NPO  -SM      Prior Level of Function- Swallowing  other (comment)   mech soft or pureed per chart  -LS diet modifications- foods;other (comment)   mech-soft per dtr, hx period of puree  -SM      Plans/Goals Discussed With  family;agreed upon  -LS family;agreed upon  -      Barriers to Rehab  medically complex  -LS medically complex  -SM      Clinical Impression    Patient's Goals For Discharge  --   requesting water  -LS       Family Goals For Discharge  patient able to eat/drink without coughing/choking  - patient able to return to PO diet;patient able to eat/drink without coughing/choking  -SM      Rehab Potential/Prognosis, Swallowing  good, to achieve stated therapy goals  -LS       SLP Diet Recommendation  NPO: unsafe for food/liquid intake  -LS NPO: unsafe for food/liquid intake  -SM      Recommended Diagnostics  FEES  -LS reassess via clinical swallow (non-instrumental exam)   when more alert  -SM      SLP Rec. for Method of Medication Administration  meds via alternate route;meds crushed in pudding/applesauce  -LS --   will defer decision to MD as no PO trialed today  -SM      Pain Assessment    Pain Assessment  No/denies pain  -LS Unable to assess  -      Oral Motor Structure and Function    Oral Motor Anatomy and Physiology  patient demonstrates anatomy and physiology that is WNL  -LS       Dentition Assessment  present and adequate  -       Secretion Management  WNL/WFL  -        Volitional Swallow  no difficulties initiating volitional swallow  -LS       Clinical Swallow Exam    Mode of Presentation  fed by clinician;cup;spoon;straw  -LS       Oral Phase Results  intact oral phase without signs of dysfunction  -LS       Pharyngeal Phase Results  sign/symptoms of pharyngeal impairment;cough  -LS       Summary of Clinical Exam  R/O pharyngeal dysphagia. Cough at baseline. Increased alertness that RN reports is near baseline. Pt presents w/ s/s of asp c/b cough w/ thins via spoon and straw; and cough w/ nectar thick. No overt s/s of asp w/ pureed. SLP provided edu to the family. REC: NPO until FEES on 12/3; meds via alt route or crushed in pureed.   -LS Dysphagia: SLP in room observing pt interaction with NP. Pt not arousing to her attempts. NP conveys not safe for SLP to trial any PO. Spoke with dtr who conveys hx of both puree and mech-soft diet (recently mech-soft); has never had alterations needed to liquids. SLP will check back in AM for appropriateness for dysphagia eval. Will defer PO decisions, including meds, to MD until then. Thanks   -      Fiberoptic Endoscopic Swallowing Exam    Risks/Benefits Reviewed risks/benefits explained;agreed to eval;patient;family  -AW        Anatomy and Physiology    Velopharyngeal WFL  -AW        Base of Tongue symmetrical  -AW        Epiglottis WFL  -AW        Laryngeal Function Breathing symmetrical  -AW        Laryngeal Function Phonation symmetrical  -AW        Secretions 0- Normal, no visible secretions  -AW        Secretion Description thin;clear  -AW        Ice Chips elicited swallow  -AW        Spontaneous Swallow frequency adequate  -AW        Sensory senses scope  -AW        Oral-Phary Transit WFL  -AW        Anatomy Hypopharynx    Observation Anatomic Considerations no anatomic structural deviation via FEES  -AW        FEES    Mode of Presentation thin:;pudding:;cohesive solid:;fed by clinician;cup;spoon;straw  -AW        Pharyngeal Phase  Impairment bolus to pyriforms before initiation of response  -AW        Rosenbek's Scale 1-->Level 1  -AW        Pharyngeal Phase Results functional swallow  -AW        FEES Summary Pt with mild delay of liquids to the pyriforms. Needed liquid wash with solid. No penetration/aspiration observed. Swallow is functional for pt's age.   -AW        FEES Swallow Recommendations    Eating Assistance requires caregiver to assist with positioning during eating  -AW        Recommended Diet soft textures;ground;thin liquids  -AW          User Key  (r) = Recorded By, (t) = Taken By, (c) = Cosigned By    Initials Name Effective Dates     Otilia Ivy, MS CCC-SLP 06/22/15 -     LS Mis Alcocer, MS CCC-SLP 06/22/15 -     AW Christina Herrera, MS CCC-SLP 06/22/15 -         EDUCATION  The patient has been educated in the following areas:   Dysphagia (Swallowing Impairment).    SLP Recommendation and Plan                                           Plan of Care Review  Plan Of Care Reviewed With: patient, friend  Progress: improving  Outcome Summary/Follow up Plan: FEES complete. Swallow is functional for pt's age. Mild delay in initiation but this did not impact safety. No penetration/aspiration. Mild oral residue with cracker that required liquid wash. Pt may resume PO diet - mech soft, thins, no further SLP f/u needed.              Time Calculation:         Time Calculation- SLP       12/03/17 1215          Time Calculation- SLP    SLP Start Time 1115  -AW      SLP Received On 12/03/17  -AW        User Key  (r) = Recorded By, (t) = Taken By, (c) = Cosigned By    Initials Name Provider Type    AW Christina Herrera MS CCC-SLP Speech and Language Pathologist          Therapy Charges for Today     Code Description Service Date Service Provider Modifiers Qty    [id number]  ST FIBEROPTIC ENDO EVAL SWALL 5 12/3/2017 Christina Herrera, MS CCC-SLP GN 1               Christina Herrera MS CCC-SLP  12/3/2017

## 2017-12-03 NOTE — PROGRESS NOTES
[facility] Progress Note    Admission Date: 2017    Katie Crocker  3/1/1923  [id number]    Date: 12/3/2017    Meds:    IV Anti-Infectives     Ordered     Dose/Rate Route Frequency Start Stop    17  metroNIDAZOLE (FLAGYL) IVPB 500 mg     Ordering Provider:  Alvaro Antonio MD    500 mg  100 mL/hr over 60 Minutes Intravenous Every 8 Hours 17 2200 17 2159    17 1833  cefepime (MAXIPIME) in SWFI 1g/10ml IV PUSH syringe     Ordering Provider:  EVETTE Pepper    1 g  over 5 Minutes Intravenous Every 24 Hours Scheduled 17 1930 17 0859    17 1833  DAPTOmycin (CUBICIN)in SWFI IV PUSH syringe     Ordering Provider:  EVETTE Pepper    250 mg  over 2 Minutes Intravenous Every 48 Hours 17 1915 17 2000    17 1651  doxycycline (VIBRAMYCIN) 100 mg in sodium chloride 0.9 % 250 mL IVPB     Ordering Provider:  Jerry Adames MD    100 mg  over 60 Minutes Intravenous Every 12 Hours 17 1800 17 1759    17 1553  meropenem (MERREM) 1 g/100 mL 0.9% NS VTB (mbp)     Ordering Provider:  MOODY Ruelas    1 g  over 30 Minutes Intravenous Once 17 1630 17 1736    17 1142  vancomycin 1250 mg/250 mL 0.9% NS IVPB ([facility])     Ordering Provider:  Jacky Condon MD    20 mg/kg × 59.9 kg  over 90 Minutes Intravenous Once 17 1144 17 1444    17 1142  piperacillin-tazobactam (ZOSYN) 4.5 g in iso-osmotic dextrose 100 mL IVPB (premix)     Ordering Provider:  Jacky Condon MD    4.5 g Intravenous Once 17 1143 17 1233          CC:  Sepsis, pna, UTI    SUBJECTIVE:  Again more alert today.  Sitting up in bed.  RN given meds in applesauce.  Plans for FEES tomorrow; otherwise still NPO.  No fevers.  She is demented but denies complaints.    PE:   Vital Signs  Temp (24hrs), Av.1 °F (37.3 °C), Min:98.8 °F (37.1 °C), Max:99.3 °F (37.4 °C)    Temp  Min: 98.8 °F (37.1 °C)  Max: 99.3 °F (37.4 °C)  BP   Min: 116/43  Max: 123/47  Pulse  Min: 80  Max: 89  Resp  Min: 16  Max: 20  SpO2  Min: 93 %  Max: 93 %    GENERAL: Awake and alert, in no acute distress.   HEENT:  No conjunctival injection. No icterus. [place]  NECK: Supple, no JVD     HEART: RRR; No murmur, rubs, gallops.   LUNGS: Diminished at lung bases bilaterally without wheezing, rales, rhonchi. Normal respiratory effort. Nonlabored.  ABDOMEN: Soft, nontender, nondistended. Positive bowel sounds. No rebound or guarding.  EXT:  No cyanosis, clubbing or edema. No cord.  : Genitalia generally unremarkable.  Self catheter with clear yellow urine.  SKIN: Warm and dry without cutaneous eruptions on Inspection/palpation.    NEURO: More alert, moves limbs    Laboratory Data      Results from last 7 days  Lab Units 12/03/17  0508 12/02/17  0540 12/01/17  1139   WBC 10*3/mm3 12.54* 17.25* 18.53*   HEMOGLOBIN g/dL 9.3* 10.5* 9.6*   HEMATOCRIT % 28.6* 32.4* 29.3*   PLATELETS 10*3/mm3 270 294 285       Results from last 7 days  Lab Units 12/03/17  0530   SODIUM mmol/L 141   POTASSIUM mmol/L 3.6   CHLORIDE mmol/L 111*   CO2 mmol/L 19.0*   BUN mg/dL 40*   CREATININE mg/dL 1.60*   GLUCOSE mg/dL 74   CALCIUM mg/dL 9.1       Results from last 7 days  Lab Units 12/03/17  0530   ALK PHOS U/L 61   BILIRUBIN mg/dL 0.4   ALT (SGPT) U/L 12   AST (SGOT) U/L 27       Results from last 7 days  Lab Units 12/02/17  0540   SED RATE mm/hr 88*       Results from last 7 days  Lab Units 12/02/17  0529   CRP mg/dL 40.55*       Results from last 7 days  Lab Units 12/02/17  0540   LACTATE mmol/L 1.5             Estimated Creatinine Clearance: 20.3 mL/min (by C-G formula based on Cr of 1.6).    Microbiology:  Blood Culture   Date Value Ref Range Status   12/01/2017 No growth at 24 hours  Preliminary                    Urine Culture   Date Value Ref Range Status   12/01/2017 >100,000 CFU/mL Gram Negative Bacilli (A)  Preliminary            Radiology:  Imaging Results (last 72 hours)     Procedure  Component Value Units Date/Time    XR Chest 1 View [[id number]] Collected:  12/01/17 1330     Updated:  12/01/17 1741    Narrative:       EXAMINATION: XR CHEST 1 VW- 12/01/2017     INDICATION: shortness of air     COMPARISON: NONE     FINDINGS: Cardiomegaly is noted. Bibasilar airspace opacities are noted  which are ill-defined and irregular. There is mild nodularity and  congestive abnormalities in the hilar areas. The upper lung zones are  clear.           Impression:       1. Cardiomegaly is noted with ill-defined airspace linear nodular  opacities in the mid and lower lung zones with dense consolidation at  the bases, worse on the left than right.  2. Upper lung zones are otherwise clear.     D:  12/01/2017  E:  12/01/2017     This report was finalized on 12/1/2017 5:39 PM by Dr. Blue Gallo MD.             Discussion:   89 yo female was admitted 12/1 with sepsis, pneumonia, and UTI for IV antibiotics.  She has a h/o recurrent pneumonia and MDR UTIs.  A urine culture from 5/4/15 was positive for E. coli at [facility], VRE at [facility], MDR E. coli at [facility].      Problem List:   --Severe sepsis POA with ARF, leukocytosis, fever, encephalopathy, and elevated procalcitonin.  All improving with broad spectrum abx.  --Acute complicated recurrent UTI, cx prelim GNR.  [H/o MDR UTIs; including MDR E. Coli and VRE at [facility] in 2015 - I reviewed outside records.]  --Basilar pneumonia - clinically improving; concern for aspiration.  --ARF - improved  --Leukocytosis/neutrophilia - improved  --Fever - resolved  --Acute encephalopathy, probably metabolic.  Improved.  --Hypothyroidism  --Bipolar disorder  --H/o CVA  --h/o chronic coumadin       PLAN/RECOMMENDATIONS:   --Monitor cultures, labs, UMU  Continue:  --Cefepime - adjust dose for improved renal function  --Stop Daptomycin as no VRE isolated  --Doxycyline 100 mg IV BID for atypical coverage, can change to PO when more alert.  Day 3 of planned 5 days.   --probiotic   --Flagyl 500  mg IV TID for anaerobic coverage with risk for aspiration of oral anaerobes, may also change to PO when tolerating.  --Pending cx data and adjust abx as appropriate.    Alvaro Antonio MD  12/3/2017  11:07 AM

## 2017-12-04 ENCOUNTER — APPOINTMENT (OUTPATIENT)
Dept: GENERAL RADIOLOGY | Facility: HOSPITAL | Age: 82
End: 2017-12-04

## 2017-12-04 LAB
ANION GAP SERPL CALCULATED.3IONS-SCNC: 7 MMOL/L (ref 3–11)
ARTERIAL PATENCY WRIST A: ABNORMAL
ATMOSPHERIC PRESS: ABNORMAL MMHG
BASE EXCESS BLDA CALC-SCNC: -5.8 MMOL/L (ref 0–2)
BASOPHILS # BLD AUTO: 0.03 10*3/MM3 (ref 0–0.2)
BASOPHILS NFR BLD AUTO: 0.2 % (ref 0–1)
BDY SITE: ABNORMAL
BUN BLD-MCNC: 28 MG/DL (ref 9–23)
BUN/CREAT SERPL: 31.1 (ref 7–25)
CALCIUM SPEC-SCNC: 8.6 MG/DL (ref 8.7–10.4)
CHLORIDE SERPL-SCNC: 115 MMOL/L (ref 99–109)
CO2 BLDA-SCNC: 19.6 MMOL/L (ref 22–33)
CO2 SERPL-SCNC: 19 MMOL/L (ref 20–31)
COHGB MFR BLD: 0.9 % (ref 0–2)
CREAT BLD-MCNC: 0.9 MG/DL (ref 0.6–1.3)
DEPRECATED RDW RBC AUTO: 55.4 FL (ref 37–54)
EOSINOPHIL # BLD AUTO: 0.08 10*3/MM3 (ref 0–0.3)
EOSINOPHIL NFR BLD AUTO: 0.5 % (ref 0–3)
ERYTHROCYTE [DISTWIDTH] IN BLOOD BY AUTOMATED COUNT: 16 % (ref 11.3–14.5)
GFR SERPL CREATININE-BSD FRML MDRD: 58 ML/MIN/1.73
GLUCOSE BLD-MCNC: 83 MG/DL (ref 70–100)
GLUCOSE BLDC GLUCOMTR-MCNC: 153 MG/DL (ref 70–130)
GLUCOSE BLDC GLUCOMTR-MCNC: 94 MG/DL (ref 70–130)
GLUCOSE BLDC GLUCOMTR-MCNC: 95 MG/DL (ref 70–130)
GLUCOSE BLDC GLUCOMTR-MCNC: 98 MG/DL (ref 70–130)
HCO3 BLDA-SCNC: 18.6 MMOL/L (ref 20–26)
HCT VFR BLD AUTO: 29.6 % (ref 34.5–44)
HCT VFR BLD CALC: 28.9 %
HGB BLD-MCNC: 9.3 G/DL (ref 11.5–15.5)
HGB BLDA-MCNC: 9.4 G/DL (ref 14–18)
HOROWITZ INDEX BLD+IHG-RTO: 32 %
IMM GRANULOCYTES # BLD: 0.28 10*3/MM3 (ref 0–0.03)
IMM GRANULOCYTES NFR BLD: 1.7 % (ref 0–0.6)
INR PPP: 1.93
LYMPHOCYTES # BLD AUTO: 1.34 10*3/MM3 (ref 0.6–4.8)
LYMPHOCYTES NFR BLD AUTO: 8.3 % (ref 24–44)
MCH RBC QN AUTO: 29.7 PG (ref 27–31)
MCHC RBC AUTO-ENTMCNC: 31.4 G/DL (ref 32–36)
MCV RBC AUTO: 94.6 FL (ref 80–99)
METHGB BLD QL: 1.3 % (ref 0–1.5)
MODALITY: ABNORMAL
MONOCYTES # BLD AUTO: 1.28 10*3/MM3 (ref 0–1)
MONOCYTES NFR BLD AUTO: 7.9 % (ref 0–12)
NEUTROPHILS # BLD AUTO: 13.22 10*3/MM3 (ref 1.5–8.3)
NEUTROPHILS NFR BLD AUTO: 81.4 % (ref 41–71)
OXYHGB MFR BLDV: 93 % (ref 94–99)
PCO2 BLDA: 31.7 MM HG (ref 35–45)
PH BLDA: 7.38 PH UNITS (ref 7.35–7.45)
PLATELET # BLD AUTO: 307 10*3/MM3 (ref 150–450)
PMV BLD AUTO: 9.7 FL (ref 6–12)
PO2 BLDA: 71.1 MM HG (ref 83–108)
POTASSIUM BLD-SCNC: 3.4 MMOL/L (ref 3.5–5.5)
PROTHROMBIN TIME: 21.5 SECONDS (ref 9.6–11.5)
RBC # BLD AUTO: 3.13 10*6/MM3 (ref 3.89–5.14)
SODIUM BLD-SCNC: 141 MMOL/L (ref 132–146)
WBC NRBC COR # BLD: 16.23 10*3/MM3 (ref 3.5–10.8)

## 2017-12-04 PROCEDURE — 85025 COMPLETE CBC W/AUTO DIFF WBC: CPT | Performed by: INTERNAL MEDICINE

## 2017-12-04 PROCEDURE — 99233 SBSQ HOSP IP/OBS HIGH 50: CPT | Performed by: FAMILY MEDICINE

## 2017-12-04 PROCEDURE — 71010 HC CHEST PA OR AP: CPT

## 2017-12-04 PROCEDURE — 80048 BASIC METABOLIC PNL TOTAL CA: CPT | Performed by: INTERNAL MEDICINE

## 2017-12-04 PROCEDURE — 82962 GLUCOSE BLOOD TEST: CPT

## 2017-12-04 PROCEDURE — 85610 PROTHROMBIN TIME: CPT | Performed by: NURSE PRACTITIONER

## 2017-12-04 PROCEDURE — 36600 WITHDRAWAL OF ARTERIAL BLOOD: CPT | Performed by: FAMILY MEDICINE

## 2017-12-04 PROCEDURE — 25010000002 CEFEPIME PER 500 MG: Performed by: INTERNAL MEDICINE

## 2017-12-04 PROCEDURE — 82805 BLOOD GASES W/O2 SATURATION: CPT | Performed by: FAMILY MEDICINE

## 2017-12-04 RX ORDER — ACETAMINOPHEN 325 MG/1
650 TABLET ORAL EVERY 6 HOURS PRN
Status: DISCONTINUED | OUTPATIENT
Start: 2017-12-04 | End: 2017-12-14 | Stop reason: HOSPADM

## 2017-12-04 RX ORDER — POTASSIUM CHLORIDE 1.5 G/1.77G
20 POWDER, FOR SOLUTION ORAL ONCE
Status: COMPLETED | OUTPATIENT
Start: 2017-12-04 | End: 2017-12-04

## 2017-12-04 RX ORDER — WARFARIN SODIUM 1 MG/1
0.5 TABLET ORAL
Status: COMPLETED | OUTPATIENT
Start: 2017-12-04 | End: 2017-12-04

## 2017-12-04 RX ADMIN — METRONIDAZOLE 500 MG: 500 INJECTION, SOLUTION INTRAVENOUS at 14:54

## 2017-12-04 RX ADMIN — SODIUM CHLORIDE 75 ML/HR: 9 INJECTION, SOLUTION INTRAVENOUS at 04:31

## 2017-12-04 RX ADMIN — ATORVASTATIN CALCIUM 10 MG: 10 TABLET, FILM COATED ORAL at 08:35

## 2017-12-04 RX ADMIN — WARFARIN SODIUM 0.5 MG: 1 TABLET ORAL at 17:49

## 2017-12-04 RX ADMIN — DIVALPROEX SODIUM 500 MG: 500 TABLET, DELAYED RELEASE ORAL at 08:38

## 2017-12-04 RX ADMIN — LEVOTHYROXINE SODIUM 50 MCG: 50 TABLET ORAL at 08:36

## 2017-12-04 RX ADMIN — DOXYCYCLINE 100 MG: 100 INJECTION, POWDER, LYOPHILIZED, FOR SOLUTION INTRAVENOUS at 17:52

## 2017-12-04 RX ADMIN — METRONIDAZOLE 500 MG: 500 INJECTION, SOLUTION INTRAVENOUS at 05:16

## 2017-12-04 RX ADMIN — METRONIDAZOLE 500 MG: 500 INJECTION, SOLUTION INTRAVENOUS at 21:34

## 2017-12-04 RX ADMIN — ALLOPURINOL 150 MG: 300 TABLET ORAL at 08:36

## 2017-12-04 RX ADMIN — ACETAMINOPHEN 650 MG: 325 TABLET, FILM COATED ORAL at 21:40

## 2017-12-04 RX ADMIN — DIGOXIN 125 MCG: 125 TABLET ORAL at 11:45

## 2017-12-04 RX ADMIN — WATER 1 G: 1 INJECTION INTRAMUSCULAR; INTRAVENOUS; SUBCUTANEOUS at 11:46

## 2017-12-04 RX ADMIN — CITALOPRAM HYDROBROMIDE 20 MG: 20 TABLET ORAL at 08:35

## 2017-12-04 RX ADMIN — ACETAMINOPHEN 650 MG: 325 TABLET, FILM COATED ORAL at 01:47

## 2017-12-04 RX ADMIN — POTASSIUM CHLORIDE 20 MEQ: 1.5 POWDER, FOR SOLUTION ORAL at 11:46

## 2017-12-04 RX ADMIN — MIRTAZAPINE 7.5 MG: 15 TABLET, FILM COATED ORAL at 21:34

## 2017-12-04 RX ADMIN — Medication 1 CAPSULE: at 08:36

## 2017-12-04 RX ADMIN — DOXYCYCLINE 100 MG: 100 INJECTION, POWDER, LYOPHILIZED, FOR SOLUTION INTRAVENOUS at 06:27

## 2017-12-04 NOTE — PLAN OF CARE
Problem: Patient Care Overview (Adult)  Goal: Plan of Care Review  Outcome: Ongoing (interventions implemented as appropriate)    12/04/17 2357   Outcome Evaluation   Outcome Summary/Follow up Plan Patient has slept most of the day. Has ate very little. Denies pain or discomfort. VS stable.        Goal: Adult Individualization and Mutuality  Outcome: Ongoing (interventions implemented as appropriate)    Problem: Pressure Ulcer (Adult)  Goal: Signs and Symptoms of Listed Potential Problems Will be Absent or Manageable (Pressure Ulcer)  Outcome: Ongoing (interventions implemented as appropriate)    Problem: Fall Risk (Adult)  Goal: Identify Related Risk Factors and Signs and Symptoms  Outcome: Ongoing (interventions implemented as appropriate)  Goal: Absence of Falls  Outcome: Ongoing (interventions implemented as appropriate)

## 2017-12-04 NOTE — PROGRESS NOTES
"Pharmacy Consult  -  Warfarin    Katie Crocker is a 89 y.o. female   Height - 63\" (160 cm)  Weight - 131 lb 3.2 oz (59.5 kg)    Consulting Provider: - MOODY Ruelas  Indication: - afib  Goal INR: - 2-3  Home Regimen:   - 2 mg on fridays   - 3 mg all other days    Drug-Drug Interactions with current regimen:  Flagyl 500 mg IV q8h   Doxycycline 100 mg IV q12h   Citalopram 20 mg po daily   Mirtazapine 7.5 mg po daily   Divalproex 500 mg po daily       Warfarin Dosing During Admission:    Date  12/1 12/2 12/3 12/4        INR  aptt too high to measure inr 1.29 1.26 1.93        Dose  ----- 1 mg 1 mg 0.5 mg              Labs:    Results from last 7 days   Lab Units 12/04/17  0505 12/03/17  0508 12/02/17  0540 12/02/17  0529 12/01/17  1538 12/01/17  1139   INR  1.93 1.26 --  1.29 --  --    APTT seconds --  --  --  --  89.3* --    HEMOGLOBIN g/dL 9.3* 9.3* 10.5* --  --  9.6*   HEMATOCRIT % 29.6* 28.6* 32.4* --  --  29.3*   PLATELETS 10*3/mm3 307 270 294 --  --  285     Results from last 7 days   Lab Units 12/04/17  0505 12/03/17  0530 12/02/17  0529 12/01/17  1219   SODIUM mmol/L 141 141 133 133   POTASSIUM mmol/L 3.4* 3.6 3.8 4.1   CHLORIDE mmol/L 115* 111* 101 101   CO2 mmol/L 19.0* 19.0* 20.0 22.0   BUN mg/dL 28* 40* 46* 40*   CREATININE mg/dL 0.90 1.60* 2.10* 2.40*   CALCIUM mg/dL 8.6* 9.1 8.0* 8.4*   BILIRUBIN mg/dL --  0.4 0.4 0.3   ALK PHOS U/L --  61 49 51   ALT (SGPT) U/L --  12 6* 6*   AST (SGOT) U/L --  27 22 26   GLUCOSE mg/dL 83 74 74 104*       Current dietary intake: 1 meal documented 25%       Assessment/Plan:   Patient was given Vitamin K 5mg IV x 1, 12/1 @ 1742.    Warfarin resumed cautiously 12/02 at 1mg daily yesterday due to age, supratherapeutic admit INR, and major drug interactions.     Patient's INR is 1.93 today. Due to the magnitude of patient's INR increase, in the setting of vitamin K administration 3 days prior, patient will receive Warfarin 0.5 mg today.   Continue to monitor " for s/sx of bleeding, drug-drug interactions, and dietary intake.   Pharmacy will continue to follow closely and dose adjust accordingly.     Martina Renner, PharmD Candidate 2018  12/4/2017  8:02 AM      Raine Steen PharmD  12/4/2017  9:08 AM

## 2017-12-04 NOTE — PROGRESS NOTES
Continued Stay Note  [facility]     Patient Name: Katie Crocker  MRN: [id number]  Today's Date: 12/4/2017    Admit Date: 12/1/2017          Discharge Plan       12/04/17 1201    Case Management/Social Work Plan    Plan [facility] long-term     Patient/Family In Agreement With Plan yes    Additional Comments Spoke with Elida at [facility].  Confirmed pt has bed hold for 10 more days, however, they will accept pt's return even if bed hold expires.  Per Elida, pt will need an ambulance at time of DC.  CM will cont to follow and make arrangements when appropriate.                Discharge Codes     None        Expected Discharge Date and Time     Expected Discharge Date Expected Discharge Time    Dec 6, 2017             Hermelinda Hall

## 2017-12-04 NOTE — PLAN OF CARE
Problem: Patient Care Overview (Adult)  Goal: Plan of Care Review  Outcome: Ongoing (interventions implemented as appropriate)    12/04/17 0334   Coping/Psychosocial Response Interventions   Plan Of Care Reviewed With patient   Patient Care Overview   Progress declining   Outcome Evaluation   Outcome Summary/Follow up Plan pt spiked a fever of 100.3 during the night and HR hovered around 110. Hospitalist APRN called. Pt sleeping in between care. RN will continue to monitor pt.         Problem: Pressure Ulcer (Adult)  Goal: Signs and Symptoms of Listed Potential Problems Will be Absent or Manageable (Pressure Ulcer)  Outcome: Ongoing (interventions implemented as appropriate)    Problem: Fall Risk (Adult)  Goal: Identify Related Risk Factors and Signs and Symptoms  Outcome: Ongoing (interventions implemented as appropriate)  Goal: Absence of Falls  Outcome: Ongoing (interventions implemented as appropriate)

## 2017-12-04 NOTE — PROGRESS NOTES
[facility] Progress Note    Admission Date: 2017    Katie Crocker  3/1/1923  [id number]    Date: 2017    Meds:    IV Anti-Infectives     Ordered     Dose/Rate Route Frequency Start Stop    17 1114  cefepime (MAXIPIME) in SWFI 1g/10ml IV PUSH syringe     Ordering Provider:  Alvaro Antonio MD    1 g  over 5 Minutes Intravenous Every 12 Hours 17 2247 17 2259    17 203  metroNIDAZOLE (FLAGYL) IVPB 500 mg     Ordering Provider:  Alvaro Antonio MD    500 mg  100 mL/hr over 60 Minutes Intravenous Every 8 Hours 17 2200 17 2159    17 165  doxycycline (VIBRAMYCIN) 100 mg in sodium chloride 0.9 % 250 mL IVPB     Ordering Provider:  Jerry Adames MD    100 mg  over 60 Minutes Intravenous Every 12 Hours 17 1800 17 1759    17 1553  meropenem (MERREM) 1 g/100 mL 0.9% NS VTB (mbp)     Ordering Provider:  MOODY Ruelas    1 g  over 30 Minutes Intravenous Once 17 1630 17 1736    17 1142  vancomycin 1250 mg/250 mL 0.9% NS IVPB ([facility])     Ordering Provider:  Jacky Condon MD    20 mg/kg × 59.9 kg  over 90 Minutes Intravenous Once 17 1144 17 1444    17 1142  piperacillin-tazobactam (ZOSYN) 4.5 g in iso-osmotic dextrose 100 mL IVPB (premix)     Ordering Provider:  Jacky Condon MD    4.5 g Intravenous Once 17 1143 17 1233          CC:  Sepsis, pna, UTI    SUBJECTIVE:  Less alert today.  Low grade fever to 100.3 overnight.  Appears a bit diaphoretic today.  WBC up on labs.  Was allowed to eat last evening and this am.      PE:   Vital Signs  Temp (24hrs), Av.9 °F (37.2 °C), Min:97.6 °F (36.4 °C), Max:100.3 °F (37.9 °C)    Temp  Min: 97.6 °F (36.4 °C)  Max: 100.3 °F (37.9 °C)  BP  Min: 111/55  Max: 133/56  Pulse  Min: 103  Max: 119  Resp  Min: 18  Max: 22  SpO2  Min: 90 %  Max: 93 %    GENERAL: Awake but slower to respond.  A bit dishelved.   HEENT:  No conjunctival injection. No icterus.  Oropharynx clear without evidence of thrush or exudate. [place]  HEART: RRR; No murmur, rubs, gallops.   LUNGS: Diminished at lung bases bilaterally and right mid/upper lung - new. Normal respiratory effort. Nonlabored.  ABDOMEN: Soft, nontender, nondistended. Positive bowel sounds. No rebound or guarding. NO mass or HSM.  EXT:  No cyanosis, clubbing or edema. No cord.  : Genitalia generally unremarkable.  SKIN: Warm and dry without cutaneous eruptions on Inspection/palpation.    NEURO: Less alert, moves limbs    Laboratory Data      Results from last 7 days  Lab Units 12/04/17  0505 12/03/17  0508 12/02/17  0540   WBC 10*3/mm3 16.23* 12.54* 17.25*   HEMOGLOBIN g/dL 9.3* 9.3* 10.5*   HEMATOCRIT % 29.6* 28.6* 32.4*   PLATELETS 10*3/mm3 307 270 294       Results from last 7 days  Lab Units 12/04/17  0505   SODIUM mmol/L 141   POTASSIUM mmol/L 3.4*   CHLORIDE mmol/L 115*   CO2 mmol/L 19.0*   BUN mg/dL 28*   CREATININE mg/dL 0.90   GLUCOSE mg/dL 83   CALCIUM mg/dL 8.6*       Results from last 7 days  Lab Units 12/03/17  0530   ALK PHOS U/L 61   BILIRUBIN mg/dL 0.4   ALT (SGPT) U/L 12   AST (SGOT) U/L 27       Results from last 7 days  Lab Units 12/02/17  0540   SED RATE mm/hr 88*       Results from last 7 days  Lab Units 12/02/17  0529   CRP mg/dL 40.55*       Results from last 7 days  Lab Units 12/02/17  0540   LACTATE mmol/L 1.5             Estimated Creatinine Clearance: 35.9 mL/min (by C-G formula based on Cr of 0.9).    Microbiology:      Influenza A & B, RT PCR - Swab, Nasopharynx [[id number]] (Normal) Collected: 12/01/17 1703       Lab Status: Final result Specimen: Swab from Nasopharynx Updated: 12/01/17 1820        Influenza A PCR Not Detected        Influenza B PCR Not Detected       Eosinophil Smear - Urine, Urine, Clean Catch [[id number]] (Normal) Collected: 12/01/17 1702       Lab Status: Final result Specimen: Urine from Urine, Clean Catch Updated: 12/01/17 2135        Eosinophil Smear 0 % EOS/100 Cells         Narrative:         No eosinophil seen       Blood Culture - Blood, [[id number]] (Normal) Collected: 12/01/17 1148       Lab Status: Preliminary result Specimen: Blood from Arm, Left Updated: 12/03/17 1231        Blood Culture No growth at 2 days       Blood Culture - Blood, [[id number]] (Normal) Collected: 12/01/17 1140       Lab Status: Preliminary result Specimen: Blood from Arm, Right Updated: 12/03/17 1231        Blood Culture No growth at 2 days       Urine Culture - Urine, Urine, Clean Catch [[id number]] (Abnormal)  Collected: 12/01/17 1137       Lab Status: Final result Specimen: Urine from Urine, Catheter Updated: 12/03/17 1143        Urine Culture --         >100,000 CFU/mL Escherichia coli (A)         Susceptibility         Escherichia coli         MARCUS         Ampicillin >16  Resistant         Ampicillin + Sulbactam >16/8  Resistant         Aztreonam <=8  Susceptible         Cefepime <=8  Susceptible         Cefotaxime <=2  Susceptible         Ceftriaxone <=8  Susceptible         Cefuroxime sodium <=4  Susceptible         Cephalothin 16  Intermediate         Ertapenem <=1  Susceptible         Gentamicin <=4  Susceptible         Levofloxacin <=2  Susceptible         Meropenem <=1  Susceptible         Nitrofurantoin <=32  Susceptible         Piperacillin + Tazobactam <=16  Susceptible         Tetracycline 8  Intermediate         Tobramycin <=4  Susceptible         Trimethoprim + Sulfamethoxazole >2/38  Resistant                                          Radiology:  Imaging Results (last 72 hours)     Procedure Component Value Units Date/Time    XR Chest 1 View [[id number]] Collected:  12/01/17 1330     Updated:  12/01/17 1741    Narrative:       EXAMINATION: XR CHEST 1 VW- 12/01/2017     INDICATION: shortness of air     COMPARISON: NONE     FINDINGS: Cardiomegaly is noted. Bibasilar airspace opacities are noted  which are ill-defined and irregular. There is mild nodularity and  congestive abnormalities in the  hilar areas. The upper lung zones are  clear.           Impression:       1. Cardiomegaly is noted with ill-defined airspace linear nodular  opacities in the mid and lower lung zones with dense consolidation at  the bases, worse on the left than right.  2. Upper lung zones are otherwise clear.     D:  12/01/2017  E:  12/01/2017     This report was finalized on 12/1/2017 5:39 PM by Dr. Blue Gallo MD.               Discussion:   89 yo female was admitted 12/1 with sepsis, pneumonia, and UTI for IV antibiotics.  She has a h/o recurrent pneumonia and MDR UTIs.  A urine culture from 5/4/15 was positive for E. coli at [facility], VRE at [facility], MDR E. coli at [facility].      Problem List:   --Severe sepsis POA with ARF, leukocytosis, fever, encephalopathy, and elevated procalcitonin.  --Acute complicated recurrent UTI, cx with E. coli.  [H/o MDR UTIs; including MDR E. Coli and VRE at [facility] in 2015 - I reviewed outside records.]  --Basilar pneumonia - concern for aspiration  --ARF - resolved  --Leukocytosis/neutrophilia - improved initially but now worse  --Fever - improved  --Elevated procalcitonin  --Acute encephalopathy, probably metabolic.  --Acute probable systolic CHF  --Hypothyroidism  --Bipolar disorder  --H/o CVA  --h/o chronic coumadin       PLAN/RECOMMENDATIONS:   --Repeat CXR  --Follow WBC and PCT  --Dose adjust cefepime up for normalizing renal function  --Continue Doxycyline 100 mg IV BID for atypical coverage, can change to PO when more alert; day 4 - will finish 5 days.  --probiotic   --Continue Flagyl 500 mg IV TID for anaerobic coverage with risk for aspiration of oral anaerobes, may change to PO when more alert.  --If diarrhea, check C diff PCR     Complicated case, prognosis guarded.  Discussed with pt's daughter at bedside.    Alvaro Antonio MD  12/4/2017  12:02 PM

## 2017-12-04 NOTE — PROGRESS NOTES
[facility] Medicine Services  PROGRESS NOTE    Patient Name: Katie Crocker  : 3/1/1923  MRN: [id number]    Date of Admission: 2017  Length of Stay: 3  Primary Care Physician: Jose Armando Sharif MD    Subjective   Subjective     CC:  Sepsis (present on admission)    Subjective:  No family present, sleepy yesterday per nurse and sleepy today, awakened and took meds this am. Low grade fever 100.3. Eating this am.per nursing  .      Review of Systems    Otherwise ROS is negative except as mentioned above.    Objective   Objective     Vital Signs:   Temp:  [97.6 °F (36.4 °C)-100.3 °F (37.9 °C)] 98.9 °F (37.2 °C)  Heart Rate:  [103-119] 111  Resp:  [18-22] 20  BP: (111-133)/(50-67) 111/55        Physical Exam:  Constitutional: opens eyes but slow to respond.  Eyes: PERRLA, sclerae anicteric, no conjunctival injection  HENT: NCAT, mucous membranes moist  Neck: Supple, no thyromegaly, no lymphadenopathy, trachea midline  Respiratory: decreased bs bilaterally, nonlabored respirations   Cardiovascular: RRR, no murmurs, rubs, or gallops, palpable pedal pulses bilaterally  Gastrointestinal: Positive bowel sounds, soft, nontender, nondistended  Musculoskeletal: No bilateral ankle edema, no clubbing or cyanosis to extremities  Psychiatric: sleepy, lethargic today   Skin: No rashes    Results Reviewed:  I have personally reviewed current lab, radiology, and data and agree.      Results from last 7 days  Lab Units 17  0505 17  0508 17  0540 17  0529   WBC 10*3/mm3 16.23* 12.54* 17.25*  --    HEMOGLOBIN g/dL 9.3* 9.3* 10.5*  --    HEMATOCRIT % 29.6* 28.6* 32.4*  --    PLATELETS 10*3/mm3 307 270 294  --    INR  1.93 1.26  --  1.29       Results from last 7 days  Lab Units 17  0505 17  0530 17  0529 17  1219   SODIUM mmol/L 141 141 133 133   POTASSIUM mmol/L 3.4* 3.6 3.8 4.1   CHLORIDE mmol/L 115* 111* 101 101   CO2 mmol/L 19.0* 19.0* 20.0 22.0    BUN mg/dL 28* 40* 46* 40*   CREATININE mg/dL 0.90 1.60* 2.10* 2.40*   GLUCOSE mg/dL 83 74 74 104*   CALCIUM mg/dL 8.6* 9.1 8.0* 8.4*   ALT (SGPT) U/L  --  12 6* 6*   AST (SGOT) U/L  --  27 22 26     BNP   Date Value Ref Range Status   12/02/2017 463.0 (H) 0.0 - 100.0 pg/mL Final     pH, Arterial   Date Value Ref Range Status   12/01/2017 7.399 7.350 - 7.450 pH units Final       Microbiology Results Abnormal     Procedure Component Value - Date/Time    Blood Culture - Blood, [[id number]]  (Normal) Collected:  12/01/17 1148    Lab Status:  Preliminary result Specimen:  Blood from Arm, Left Updated:  12/03/17 1231     Blood Culture No growth at 2 days    Blood Culture - Blood, [[id number]]  (Normal) Collected:  12/01/17 1140    Lab Status:  Preliminary result Specimen:  Blood from Arm, Right Updated:  12/03/17 1231     Blood Culture No growth at 2 days    Urine Culture - Urine, Urine, Clean Catch [[id number]]  (Abnormal)  (Susceptibility) Collected:  12/01/17 1137    Lab Status:  Final result Specimen:  Urine from Urine, Catheter Updated:  12/03/17 1143     Urine Culture --      >100,000 CFU/mL Escherichia coli (A)    Susceptibility      Escherichia coli     MARCUS     Ampicillin >16 ug/ml Resistant     Ampicillin + Sulbactam >16/8 ug/ml Resistant     Aztreonam <=8 ug/ml Susceptible     Cefepime <=8 ug/ml Susceptible     Cefotaxime <=2 ug/ml Susceptible     Ceftriaxone <=8 ug/ml Susceptible     Cefuroxime sodium <=4 ug/ml Susceptible     Cephalothin 16 ug/ml Intermediate     Ertapenem <=1 ug/ml Susceptible     Gentamicin <=4 ug/ml Susceptible     Levofloxacin <=2 ug/ml Susceptible     Meropenem <=1 ug/ml Susceptible     Nitrofurantoin <=32 ug/ml Susceptible     Piperacillin + Tazobactam <=16 ug/ml Susceptible     Tetracycline 8 ug/ml Intermediate     Tobramycin <=4 ug/ml Susceptible     Trimethoprim + Sulfamethoxazole >2/38 ug/ml Resistant                    Eosinophil Smear - Urine, Urine, Clean Catch [[id number]]  (Normal)  Collected:  12/01/17 1702    Lab Status:  Final result Specimen:  Urine from Urine, Clean Catch Updated:  12/01/17 2137     Eosinophil Smear 0 % EOS/100 Cells     Narrative:       No eosinophil seen    Influenza A & B, RT PCR - Swab, Nasopharynx [[id number]]  (Normal) Collected:  12/01/17 1703    Lab Status:  Final result Specimen:  Swab from Nasopharynx Updated:  12/01/17 1820     Influenza A PCR Not Detected     Influenza B PCR Not Detected          Imaging Results (last 24 hours)     ** No results found for the last 24 hours. **        Results for orders placed during the hospital encounter of 12/01/17   Adult Transthoracic Echo Complete W/ Cont if Necessary Per Protocol    Narrative · Left ventricular systolic function is normal. Estimated EF = 55%.  · Mild aortic valve regurgitation is present.  · Mild-to-moderate mitral valve regurgitation is present  · Moderate to severe tricuspid valve regurgitation is present.  · Calculated right ventricular systolic pressure from tricuspid   regurgitation is 55 mmHg.          I have reviewed the medications.    Assessment/Plan   Assessment / Plan     Hospital Problem List     * (Principal)Sepsis    HCAP (healthcare-associated pneumonia)    Urinary tract infection    Hypertension    Hyperlipidemia    GERD (gastroesophageal reflux disease)    CHF (congestive heart failure)    Depression    Gout    Hypothyroidism    Hemiparesis affecting right side as late effect of stroke    Atrial fibrillation    Supratherapeutic INR    Acute on chronic kidney failure    Anemia             Brief Hospital Course to date:  Katie Crocker is a 89 y.o. female with past medical history significant for hypertension, hyperlipidemia, hypothyroidism, atrial fibrillation, history of CVA, chronic kidney disease.  The patient was admitted for altered mental status and she was found to have pneumonia and UTI at the emergency room.      Assessment & Plan:  * Severe Sepsis POA - improving - ID  following - high procalcitonin on admission    *HCAP with CXR evidence of consolidation, on antibiotics and currently afebrile.cefepime/doxy/flagyl, ID following and managing abx , ABG pending, CXR     * E Coli UTI (POA)- Abx per ID     * Altered mental status secondary to sepsis.  Appears worse today and yesterday per nursing    * Dysphagia.  Patient failed swallow test twice but then took Fees and passed. ok'd diet per ST     * Hypertension.    *Leukocytosis- elevated from yesterday? Reaspiration?     *Hypokalemia- replace today     * Congestive heart failure - EF 55%, mod/severe tricuspid regurg, elevated RVSP, stop IVF,     * Acute on Chronic Kidney Disease - improving - back to baseline now    * Supratherapeutic INR on admission - INR 1.93, back on warfarin      DVT Prophylaxis:  Anticoagulated with warfarin    CODE STATUS: Conditional Code    Disposition: TBD.    Ita Sevilla, DO  12/04/17  12:28 PM

## 2017-12-05 LAB
ANION GAP SERPL CALCULATED.3IONS-SCNC: 10 MMOL/L (ref 3–11)
BACTERIA UR QL AUTO: ABNORMAL /HPF
BASOPHILS # BLD AUTO: 0.08 10*3/MM3 (ref 0–0.2)
BASOPHILS NFR BLD AUTO: 0.4 % (ref 0–1)
BILIRUB UR QL STRIP: NEGATIVE
BUN BLD-MCNC: 20 MG/DL (ref 9–23)
BUN/CREAT SERPL: 20 (ref 7–25)
CALCIUM SPEC-SCNC: 8.8 MG/DL (ref 8.7–10.4)
CHLORIDE SERPL-SCNC: 110 MMOL/L (ref 99–109)
CLARITY UR: CLEAR
CO2 SERPL-SCNC: 20 MMOL/L (ref 20–31)
COLOR UR: YELLOW
CREAT BLD-MCNC: 1 MG/DL (ref 0.6–1.3)
DEPRECATED RDW RBC AUTO: 56.4 FL (ref 37–54)
EOSINOPHIL # BLD AUTO: 0.05 10*3/MM3 (ref 0–0.3)
EOSINOPHIL NFR BLD AUTO: 0.3 % (ref 0–3)
ERYTHROCYTE [DISTWIDTH] IN BLOOD BY AUTOMATED COUNT: 16.3 % (ref 11.3–14.5)
GFR SERPL CREATININE-BSD FRML MDRD: 52 ML/MIN/1.73
GLUCOSE BLD-MCNC: 97 MG/DL (ref 70–100)
GLUCOSE UR STRIP-MCNC: NEGATIVE MG/DL
HCT VFR BLD AUTO: 31.4 % (ref 34.5–44)
HGB BLD-MCNC: 10.2 G/DL (ref 11.5–15.5)
HGB UR QL STRIP.AUTO: NEGATIVE
HYALINE CASTS UR QL AUTO: ABNORMAL /LPF
IMM GRANULOCYTES # BLD: 0.61 10*3/MM3 (ref 0–0.03)
IMM GRANULOCYTES NFR BLD: 3.3 % (ref 0–0.6)
INR PPP: 3.03
KETONES UR QL STRIP: NEGATIVE
LEUKOCYTE ESTERASE UR QL STRIP.AUTO: ABNORMAL
LYMPHOCYTES # BLD AUTO: 1.73 10*3/MM3 (ref 0.6–4.8)
LYMPHOCYTES NFR BLD AUTO: 9.4 % (ref 24–44)
MCH RBC QN AUTO: 31.4 PG (ref 27–31)
MCHC RBC AUTO-ENTMCNC: 32.5 G/DL (ref 32–36)
MCV RBC AUTO: 96.6 FL (ref 80–99)
MONOCYTES # BLD AUTO: 1.62 10*3/MM3 (ref 0–1)
MONOCYTES NFR BLD AUTO: 8.8 % (ref 0–12)
NEUTROPHILS # BLD AUTO: 14.32 10*3/MM3 (ref 1.5–8.3)
NEUTROPHILS NFR BLD AUTO: 77.8 % (ref 41–71)
NITRITE UR QL STRIP: NEGATIVE
PH UR STRIP.AUTO: 6 [PH] (ref 5–8)
PLATELET # BLD AUTO: 275 10*3/MM3 (ref 150–450)
PMV BLD AUTO: 9.6 FL (ref 6–12)
POTASSIUM BLD-SCNC: 3.6 MMOL/L (ref 3.5–5.5)
PROCALCITONIN SERPL-MCNC: 1.37 NG/ML
PROT UR QL STRIP: ABNORMAL
PROTHROMBIN TIME: 34.2 SECONDS (ref 9.6–11.5)
RBC # BLD AUTO: 3.25 10*6/MM3 (ref 3.89–5.14)
RBC # UR: ABNORMAL /HPF
REF LAB TEST METHOD: ABNORMAL
SODIUM BLD-SCNC: 140 MMOL/L (ref 132–146)
SP GR UR STRIP: 1.02 (ref 1–1.03)
SQUAMOUS #/AREA URNS HPF: ABNORMAL /HPF
UROBILINOGEN UR QL STRIP: ABNORMAL
WBC NRBC COR # BLD: 18.41 10*3/MM3 (ref 3.5–10.8)
WBC UR QL AUTO: ABNORMAL /HPF

## 2017-12-05 PROCEDURE — 84145 PROCALCITONIN (PCT): CPT | Performed by: INTERNAL MEDICINE

## 2017-12-05 PROCEDURE — 87040 BLOOD CULTURE FOR BACTERIA: CPT | Performed by: FAMILY MEDICINE

## 2017-12-05 PROCEDURE — 25010000002 MEROPENEM: Performed by: INTERNAL MEDICINE

## 2017-12-05 PROCEDURE — 99233 SBSQ HOSP IP/OBS HIGH 50: CPT | Performed by: FAMILY MEDICINE

## 2017-12-05 PROCEDURE — 85025 COMPLETE CBC W/AUTO DIFF WBC: CPT | Performed by: INTERNAL MEDICINE

## 2017-12-05 PROCEDURE — 87086 URINE CULTURE/COLONY COUNT: CPT | Performed by: FAMILY MEDICINE

## 2017-12-05 PROCEDURE — 80048 BASIC METABOLIC PNL TOTAL CA: CPT | Performed by: FAMILY MEDICINE

## 2017-12-05 PROCEDURE — 25010000002 CEFEPIME PER 500 MG: Performed by: INTERNAL MEDICINE

## 2017-12-05 PROCEDURE — 25010000002 VANCOMYCIN HCL IN NACL 1.25-0.9 GM/250ML-% SOLUTION: Performed by: INTERNAL MEDICINE

## 2017-12-05 PROCEDURE — 81001 URINALYSIS AUTO W/SCOPE: CPT | Performed by: FAMILY MEDICINE

## 2017-12-05 PROCEDURE — 85610 PROTHROMBIN TIME: CPT | Performed by: NURSE PRACTITIONER

## 2017-12-05 PROCEDURE — 25810000003 DEXTROSE-NACL PER 500 ML: Performed by: FAMILY MEDICINE

## 2017-12-05 RX ORDER — VANCOMYCIN HYDROCHLORIDE
20 ONCE
Status: COMPLETED | OUTPATIENT
Start: 2017-12-05 | End: 2017-12-06

## 2017-12-05 RX ORDER — DEXTROSE AND SODIUM CHLORIDE 5; .9 G/100ML; G/100ML
75 INJECTION, SOLUTION INTRAVENOUS CONTINUOUS
Status: ACTIVE | OUTPATIENT
Start: 2017-12-05 | End: 2017-12-06

## 2017-12-05 RX ORDER — VANCOMYCIN HYDROCHLORIDE 1 G/200ML
15 INJECTION, SOLUTION INTRAVENOUS DAILY PRN
Status: DISCONTINUED | OUTPATIENT
Start: 2017-12-06 | End: 2017-12-07

## 2017-12-05 RX ADMIN — DIVALPROEX SODIUM 500 MG: 500 TABLET, DELAYED RELEASE ORAL at 09:00

## 2017-12-05 RX ADMIN — Medication 1 CAPSULE: at 08:58

## 2017-12-05 RX ADMIN — DOXYCYCLINE 100 MG: 100 INJECTION, POWDER, LYOPHILIZED, FOR SOLUTION INTRAVENOUS at 06:58

## 2017-12-05 RX ADMIN — ATORVASTATIN CALCIUM 10 MG: 10 TABLET, FILM COATED ORAL at 08:58

## 2017-12-05 RX ADMIN — LEVOTHYROXINE SODIUM 50 MCG: 50 TABLET ORAL at 08:58

## 2017-12-05 RX ADMIN — WATER 2 G: 1 INJECTION INTRAMUSCULAR; INTRAVENOUS; SUBCUTANEOUS at 12:54

## 2017-12-05 RX ADMIN — MIRTAZAPINE 7.5 MG: 15 TABLET, FILM COATED ORAL at 20:17

## 2017-12-05 RX ADMIN — VANCOMYCIN HYDROCHLORIDE 1250 MG: 1 INJECTION, POWDER, LYOPHILIZED, FOR SOLUTION INTRAVENOUS at 22:30

## 2017-12-05 RX ADMIN — DIGOXIN 125 MCG: 125 TABLET ORAL at 12:54

## 2017-12-05 RX ADMIN — WATER 2 G: 1 INJECTION INTRAMUSCULAR; INTRAVENOUS; SUBCUTANEOUS at 00:01

## 2017-12-05 RX ADMIN — DOXYCYCLINE 100 MG: 100 INJECTION, POWDER, LYOPHILIZED, FOR SOLUTION INTRAVENOUS at 17:22

## 2017-12-05 RX ADMIN — CITALOPRAM HYDROBROMIDE 20 MG: 20 TABLET ORAL at 08:58

## 2017-12-05 RX ADMIN — MEROPENEM 1 G: 1 INJECTION, POWDER, FOR SOLUTION INTRAVENOUS at 20:16

## 2017-12-05 RX ADMIN — DEXTROSE AND SODIUM CHLORIDE 75 ML/HR: 5; 900 INJECTION, SOLUTION INTRAVENOUS at 16:00

## 2017-12-05 RX ADMIN — METRONIDAZOLE 500 MG: 500 INJECTION, SOLUTION INTRAVENOUS at 14:27

## 2017-12-05 RX ADMIN — ALLOPURINOL 150 MG: 300 TABLET ORAL at 08:58

## 2017-12-05 RX ADMIN — METRONIDAZOLE 500 MG: 500 INJECTION, SOLUTION INTRAVENOUS at 05:25

## 2017-12-05 NOTE — PLAN OF CARE
Problem: Patient Care Overview (Adult)  Goal: Plan of Care Review  Outcome: Ongoing (interventions implemented as appropriate)    12/05/17 1500   Coping/Psychosocial Response Interventions   Plan Of Care Reviewed With patient   Patient Care Overview   Progress no change   Outcome Evaluation   Outcome Summary/Follow up Plan Pt. has slept most of the day. VSS, no complaints of pain. Patient has very little appetite. Takes pills whole in applesauce         Problem: Pressure Ulcer (Adult)  Goal: Signs and Symptoms of Listed Potential Problems Will be Absent or Manageable (Pressure Ulcer)  Outcome: Ongoing (interventions implemented as appropriate)    12/02/17 1918 12/03/17 1452   Pressure Ulcer   Problems Assessed (Pressure Ulcer) --  all   Problems Present (Pressure Ulcer) wound progression/extension --          Problem: Fall Risk (Adult)  Goal: Identify Related Risk Factors and Signs and Symptoms  Outcome: Ongoing (interventions implemented as appropriate)    12/03/17 1452   Fall Risk   Fall Risk: Related Risk Factors age-related changes;impaired vision;sleep pattern alteration;environment unfamiliar   Fall Risk: Signs and Symptoms presence of risk factors       Goal: Absence of Falls  Outcome: Ongoing (interventions implemented as appropriate)    12/05/17 1500   Fall Risk (Adult)   Absence of Falls making progress toward outcome

## 2017-12-05 NOTE — PLAN OF CARE
Problem: Patient Care Overview (Adult)  Goal: Plan of Care Review  Outcome: Ongoing (interventions implemented as appropriate)    12/05/17 0328   Coping/Psychosocial Response Interventions   Plan Of Care Reviewed With patient   Patient Care Overview   Progress declining   Outcome Evaluation   Outcome Summary/Follow up Plan pt has slept most of the night. pt incontinent of urine throughout the night.          Problem: Pressure Ulcer (Adult)  Goal: Signs and Symptoms of Listed Potential Problems Will be Absent or Manageable (Pressure Ulcer)  Outcome: Ongoing (interventions implemented as appropriate)    Problem: Fall Risk (Adult)  Goal: Identify Related Risk Factors and Signs and Symptoms  Outcome: Ongoing (interventions implemented as appropriate)  Goal: Absence of Falls  Outcome: Ongoing (interventions implemented as appropriate)

## 2017-12-05 NOTE — PROGRESS NOTES
"Pharmacy Consult  -  Warfarin    Katie Crocker is a 89 y.o. female   Height - 160 cm (63\")  Weight - 62.1 kg (137 lb)    Consulting Provider: - MOODY Ruelas  Indication: - afib  Goal INR: - 2-3  Home Regimen:   - 2 mg on fridays   - 3 mg all other days    Drug-Drug Interactions with current regimen:  Flagyl 500 mg IV q8h (increase INR)  Doxycycline 100 mg IV q12h (increase risk of bleeding)   Citalopram 20 mg po daily (increase risk of bleeding)  Mirtazapine 7.5 mg po daily (increase risk of bleeding)  Divalproex 500 mg po daily (increase risk of bleeding)       Warfarin Dosing During Admission:    Date  12/1 12/2 12/3 12/4 12/05       INR  aptt too high to measure inr 1.29 1.26 1.93 3.03       Dose  ----- 1 mg 1 mg 0.5 mg   (hold)           Labs:    Results from last 7 days   Lab Units 12/05/17  0449 12/04/17  0505 12/03/17  0508 12/02/17  0540 12/02/17  0529 12/01/17  1538 12/01/17  1139   INR  3.03 1.93 1.26 --  1.29 --  --    APTT seconds --  --  --  --  --  89.3* --    HEMOGLOBIN g/dL 10.2* 9.3* 9.3* 10.5* --  --  9.6*   HEMATOCRIT % 31.4* 29.6* 28.6* 32.4* --  --  29.3*   PLATELETS 10*3/mm3 275 307 270 294 --  --  285     Results from last 7 days   Lab Units 12/04/17  0505 12/03/17  0530 12/02/17  0529 12/01/17  1219   SODIUM mmol/L 141 141 133 133   POTASSIUM mmol/L 3.4* 3.6 3.8 4.1   CHLORIDE mmol/L 115* 111* 101 101   CO2 mmol/L 19.0* 19.0* 20.0 22.0   BUN mg/dL 28* 40* 46* 40*   CREATININE mg/dL 0.90 1.60* 2.10* 2.40*   CALCIUM mg/dL 8.6* 9.1 8.0* 8.4*   BILIRUBIN mg/dL --  0.4 0.4 0.3   ALK PHOS U/L --  61 49 51   ALT (SGPT) U/L --  12 6* 6*   AST (SGOT) U/L --  27 22 26   GLUCOSE mg/dL 83 74 74 104*       Current dietary intake: nurse's note indicates patient has little to no po intake       Assessment/Plan:   Patient was given Vitamin K 5mg IV x 1, 12/1 @ 1742.    Warfarin resumed cautiously 12/02 due to supratherapeutic admit INR, and major drug interactions.     Patient's INR is " supratherapeutic again today at 3.03. Warfarin dose will be held today.   Patient is a complicated case as she is clearly very sensitive to warfarin, is not eating, and is very acutely ill.   Continue to monitor for s/sx of bleeding, additional drug-drug interactions, and dietary intake.   Pharmacy will follow closely and dose adjust accordingly.     Martina Renner, PharmD Candidate 2018  12/5/2017  9:27 AM    Raine Steen PharmD  12/5/2017  9:40 AM

## 2017-12-05 NOTE — PROGRESS NOTES
[facility] Medicine Services  PROGRESS NOTE    Patient Name: Katie Crocker  : 3/1/1923  MRN: [id number]    Date of Admission: 2017  Length of Stay: 4  Primary Care Physician: Jose Armando Sharif MD    Subjective   Subjective     CC:  Sepsis (present on admission)    Subjective:  Daughter at bedside. Pt more awake today. On 3L NC. Family aware of poor prognosis.      Review of Systems    Otherwise ROS is negative except as mentioned above.    Objective   Objective     Vital Signs:   Temp:  [98.4 °F (36.9 °C)-99.8 °F (37.7 °C)] 99.2 °F (37.3 °C)  Heart Rate:  [] 118  Resp:  [16-20] 16  BP: (121-153)/(52-77) 150/77        Physical Exam:  Constitutional: opens eyes responds better. j  Eyes: PERRLA, sclerae anicteric, no conjunctival injection  HENT: NCAT, mucous membranes moist  Neck: Supple, no thyromegaly, no lymphadenopathy, trachea midline  Respiratory: decreased bs bilaterally, nonlabored respirations   Cardiovascular: RRR, no murmurs, rubs, or gallops, palpable pedal pulses bilaterally  Gastrointestinal: Positive bowel sounds, soft, nontender, nondistended  Musculoskeletal: No bilateral ankle edema, no clubbing or cyanosis to extremities  Psychiatric: sleepy, much less lethargic today   Skin: No rashes    Results Reviewed:  I have personally reviewed current lab, radiology, and data and agree.      Results from last 7 days  Lab Units 17  0449 17  0505 17  0508   WBC 10*3/mm3 18.41* 16.23* 12.54*   HEMOGLOBIN g/dL 10.2* 9.3* 9.3*   HEMATOCRIT % 31.4* 29.6* 28.6*   PLATELETS 10*3/mm3 275 307 270   INR  3.03 1.93 1.26       Results from last 7 days  Lab Units 17  0926 17  0505 17  0530 17  0529 17  1219   SODIUM mmol/L 140 141 141 133 133   POTASSIUM mmol/L 3.6 3.4* 3.6 3.8 4.1   CHLORIDE mmol/L 110* 115* 111* 101 101   CO2 mmol/L 20.0 19.0* 19.0* 20.0 22.0   BUN mg/dL 20 28* 40* 46* 40*   CREATININE mg/dL 1.00 0.90 1.60*  2.10* 2.40*   GLUCOSE mg/dL 97 83 74 74 104*   CALCIUM mg/dL 8.8 8.6* 9.1 8.0* 8.4*   ALT (SGPT) U/L  --   --  12 6* 6*   AST (SGOT) U/L  --   --  27 22 26     No results found for: BNP  pH, Arterial   Date Value Ref Range Status   12/04/2017 7.377 7.350 - 7.450 pH units Final       Microbiology Results Abnormal     Procedure Component Value - Date/Time    Blood Culture - Blood, [[id number]]  (Normal) Collected:  12/01/17 1148    Lab Status:  Preliminary result Specimen:  Blood from Arm, Left Updated:  12/05/17 1231     Blood Culture No growth at 4 days    Blood Culture - Blood, [[id number]]  (Normal) Collected:  12/01/17 1140    Lab Status:  Preliminary result Specimen:  Blood from Arm, Right Updated:  12/05/17 1231     Blood Culture No growth at 4 days    Urine Culture - Urine, Urine, Clean Catch [[id number]]  (Abnormal)  (Susceptibility) Collected:  12/01/17 1137    Lab Status:  Final result Specimen:  Urine from Urine, Catheter Updated:  12/03/17 1143     Urine Culture --      >100,000 CFU/mL Escherichia coli (A)    Susceptibility      Escherichia coli     MARCUS     Ampicillin >16 ug/ml Resistant     Ampicillin + Sulbactam >16/8 ug/ml Resistant     Aztreonam <=8 ug/ml Susceptible     Cefepime <=8 ug/ml Susceptible     Cefotaxime <=2 ug/ml Susceptible     Ceftriaxone <=8 ug/ml Susceptible     Cefuroxime sodium <=4 ug/ml Susceptible     Cephalothin 16 ug/ml Intermediate     Ertapenem <=1 ug/ml Susceptible     Gentamicin <=4 ug/ml Susceptible     Levofloxacin <=2 ug/ml Susceptible     Meropenem <=1 ug/ml Susceptible     Nitrofurantoin <=32 ug/ml Susceptible     Piperacillin + Tazobactam <=16 ug/ml Susceptible     Tetracycline 8 ug/ml Intermediate     Tobramycin <=4 ug/ml Susceptible     Trimethoprim + Sulfamethoxazole >2/38 ug/ml Resistant                    Eosinophil Smear - Urine, Urine, Clean Catch [[id number]]  (Normal) Collected:  12/01/17 1702    Lab Status:  Final result Specimen:  Urine from Urine, Clean Catch  Updated:  12/01/17 2137     Eosinophil Smear 0 % EOS/100 Cells     Narrative:       No eosinophil seen    Influenza A & B, RT PCR - Swab, Nasopharynx [[id number]]  (Normal) Collected:  12/01/17 1703    Lab Status:  Final result Specimen:  Swab from Nasopharynx Updated:  12/01/17 1820     Influenza A PCR Not Detected     Influenza B PCR Not Detected          Imaging Results (last 24 hours)     Procedure Component Value Units Date/Time    XR Chest 1 View [[id number]] Collected:  12/04/17 1328     Updated:  12/04/17 1359    Narrative:       EXAMINATION: XR CHEST 1 VW- 12/04/2017     INDICATION: R50.9-Fever, unspecified; J18.9-Pneumonia, unspecified  organism; E87.70-Fluid overload, unspecified; I50.9-Heart failure,  unspecified; N39.0-Urinary tract infection, site not specified;  R09.02-Hypoxemia; I48.2-Chronic atrial fibrillation      COMPARISON: 12/01/2017     FINDINGS:   1. Bilateral diffuse ill-defined airspace opacities are seen diffusely  throughout the mid and lower lung zones with coalescent consolidation at  the bases and a left base effusion.  2. There is cardiomegaly.           Impression:       Compared to previous studies of 3 days ago, the edema and  the ill-defined airspace opacities seen most densely in the mid and  lower lung zones bilaterally with high-grade consolidation at the left  base appear to be worse and have progressed somewhat.     D:  12/04/2017  E:  12/04/2017     This report was finalized on 12/4/2017 1:57 PM by Dr. Blue Gallo MD.           Results for orders placed during the hospital encounter of 12/01/17   Adult Transthoracic Echo Complete W/ Cont if Necessary Per Protocol    Narrative · Left ventricular systolic function is normal. Estimated EF = 55%.  · Mild aortic valve regurgitation is present.  · Mild-to-moderate mitral valve regurgitation is present  · Moderate to severe tricuspid valve regurgitation is present.  · Calculated right ventricular systolic pressure from tricuspid    regurgitation is 55 mmHg.          I have reviewed the medications.    Assessment/Plan   Assessment / Plan     Hospital Problem List     * (Principal)Sepsis    HCAP (healthcare-associated pneumonia)    Urinary tract infection    Hypertension    Hyperlipidemia    GERD (gastroesophageal reflux disease)    CHF (congestive heart failure)    Depression    Gout    Hypothyroidism    Hemiparesis affecting right side as late effect of stroke    Atrial fibrillation    Supratherapeutic INR    Acute on chronic kidney failure    Anemia             Brief Hospital Course to date:  Katie Crocker is a 89 y.o. female with past medical history significant for hypertension, hyperlipidemia, hypothyroidism, atrial fibrillation, history of CVA, chronic kidney disease.  The patient was admitted for altered mental status and she was found to have pneumonia and UTI at the emergency room.      Assessment & Plan:  * Severe Sepsis POA  - ID following - high procalcitonin on admission, clincally not improving. I have had a discussion with daughter and she is aware of the severeity of her mothers condition.    *HCAP with CXR evidence of consolidation, on antibiotics and currently afebrile.cefepime/doxy/flagyl, ID following and managing abx , ABG reveiewed,  CXR worsening.     * E Coli UTI (POA)- Abx per ID     * Altered mental status secondary to sepsis.  Appears worse today and yesterday per nursing    * Dysphagia.  Patient failed swallow test twice but then took Fees and passed. ok'd diet per ST .     * Hypertension.    *Leukocytosis- elevated from yesterday? Reaspiration? No diarrhea . No fever. reculture blood, check UA.     *Hypokalemia- replaced today     * Congestive heart failure - EF 55%, mod/severe tricuspid regurg, elevated RVSP, moniter needs fluids, will lightly give fluids. Pt is not taking anything orally      * Acute on Chronic Kidney Disease - improving - back to baseline now    * Supratherapeutic INR on admission -   back on warfarin, up to 3 today, pharmacy dosing.       DVT Prophylaxis:  Anticoagulated with warfarin    CODE STATUS: Conditional Code, DNR     Disposition: TBD. Overall poor prognosis     Ita Sevilla DO  12/05/17  1:56 PM

## 2017-12-06 LAB
BACTERIA SPEC AEROBE CULT: NORMAL
BACTERIA SPEC AEROBE CULT: NORMAL
INR PPP: 3.75
PROTHROMBIN TIME: 42.6 SECONDS (ref 9.6–11.5)
VANCOMYCIN TROUGH SERPL-MCNC: 36.8 MCG/ML (ref 10–20)

## 2017-12-06 PROCEDURE — 25010000002 MEROPENEM: Performed by: INTERNAL MEDICINE

## 2017-12-06 PROCEDURE — 25810000003 DEXTROSE-NACL PER 500 ML: Performed by: FAMILY MEDICINE

## 2017-12-06 PROCEDURE — 85610 PROTHROMBIN TIME: CPT | Performed by: NURSE PRACTITIONER

## 2017-12-06 PROCEDURE — 80202 ASSAY OF VANCOMYCIN: CPT | Performed by: INTERNAL MEDICINE

## 2017-12-06 PROCEDURE — 99233 SBSQ HOSP IP/OBS HIGH 50: CPT | Performed by: FAMILY MEDICINE

## 2017-12-06 RX ORDER — DEXTROSE AND SODIUM CHLORIDE 5; .9 G/100ML; G/100ML
75 INJECTION, SOLUTION INTRAVENOUS CONTINUOUS
Status: ACTIVE | OUTPATIENT
Start: 2017-12-06 | End: 2017-12-07

## 2017-12-06 RX ADMIN — MEROPENEM 1 G: 1 INJECTION, POWDER, FOR SOLUTION INTRAVENOUS at 01:38

## 2017-12-06 RX ADMIN — CITALOPRAM HYDROBROMIDE 20 MG: 20 TABLET ORAL at 10:31

## 2017-12-06 RX ADMIN — MIRTAZAPINE 7.5 MG: 15 TABLET, FILM COATED ORAL at 22:32

## 2017-12-06 RX ADMIN — ATORVASTATIN CALCIUM 10 MG: 10 TABLET, FILM COATED ORAL at 10:32

## 2017-12-06 RX ADMIN — DIVALPROEX SODIUM 500 MG: 500 TABLET, DELAYED RELEASE ORAL at 10:34

## 2017-12-06 RX ADMIN — ACETAMINOPHEN 650 MG: 325 TABLET, FILM COATED ORAL at 22:32

## 2017-12-06 RX ADMIN — LEVOTHYROXINE SODIUM 50 MCG: 50 TABLET ORAL at 10:32

## 2017-12-06 RX ADMIN — MEROPENEM 1 G: 1 INJECTION, POWDER, FOR SOLUTION INTRAVENOUS at 10:35

## 2017-12-06 RX ADMIN — Medication 1 CAPSULE: at 10:32

## 2017-12-06 RX ADMIN — MEROPENEM 1 G: 1 INJECTION, POWDER, FOR SOLUTION INTRAVENOUS at 22:32

## 2017-12-06 RX ADMIN — DOXYCYCLINE 100 MG: 100 INJECTION, POWDER, LYOPHILIZED, FOR SOLUTION INTRAVENOUS at 05:04

## 2017-12-06 RX ADMIN — ALLOPURINOL 150 MG: 300 TABLET ORAL at 10:32

## 2017-12-06 RX ADMIN — DIGOXIN 125 MCG: 125 TABLET ORAL at 13:54

## 2017-12-06 RX ADMIN — DOXYCYCLINE 100 MG: 100 INJECTION, POWDER, LYOPHILIZED, FOR SOLUTION INTRAVENOUS at 18:26

## 2017-12-06 RX ADMIN — DEXTROSE AND SODIUM CHLORIDE 75 ML/HR: 5; 900 INJECTION, SOLUTION INTRAVENOUS at 18:26

## 2017-12-06 NOTE — PROGRESS NOTES
"Pharmacy Consult  -  Warfarin    Katie Crocker is a 89 y.o. female   Height - 160 cm (63\")  Weight - 62.1 kg (136 lb 12.8 oz)    Consulting Provider: - MOODY Ruelas  Indication: - afib  Goal INR: - 2-3  Home Regimen:   - 2 mg on fridays   - 3 mg all other days    Drug-Drug Interactions with current regimen:  Citalopram 20 mg po daily (increase risk of bleeding)  Mirtazapine 7.5 mg po daily (increase risk of bleeding)  Divalproex 500 mg po daily (increase risk of bleeding)       Warfarin Dosing During Admission:    Date  12/1 12/2 12/3 12/4 12/05 12/06      INR  aptt too high to measure inr 1.29 1.26 1.93 3.03 3.75      Dose  ----- 1 mg 1 mg 0.5 mg   hold (hold)          Labs:    Results from last 7 days   Lab Units 12/06/17  0501 12/05/17  0449 12/04/17  0505 12/03/17  0508 12/02/17  0540 12/02/17  0529 12/01/17  1538 12/01/17  1139   INR  3.75 3.03 1.93 1.26 --  1.29 --  --    APTT seconds --  --  --  --  --  --  89.3* --    HEMOGLOBIN g/dL --  10.2* 9.3* 9.3* 10.5* --  --  9.6*   HEMATOCRIT % --  31.4* 29.6* 28.6* 32.4* --  --  29.3*   PLATELETS 10*3/mm3 --  275 307 270 294 --  --  285     Results from last 7 days   Lab Units 12/05/17  0926 12/04/17  0505 12/03/17  0530 12/02/17  0529 12/01/17  1219   SODIUM mmol/L 140 141 141 133 133   POTASSIUM mmol/L 3.6 3.4* 3.6 3.8 4.1   CHLORIDE mmol/L 110* 115* 111* 101 101   CO2 mmol/L 20.0 19.0* 19.0* 20.0 22.0   BUN mg/dL 20 28* 40* 46* 40*   CREATININE mg/dL 1.00 0.90 1.60* 2.10* 2.40*   CALCIUM mg/dL 8.8 8.6* 9.1 8.0* 8.4*   BILIRUBIN mg/dL --  --  0.4 0.4 0.3   ALK PHOS U/L --  --  61 49 51   ALT (SGPT) U/L --  --  12 6* 6*   AST (SGOT) U/L --  --  27 22 26   GLUCOSE mg/dL 97 83 74 74 104*       Current dietary intake: nurse's note indicates patient has little to no po intake; 50% lunch (12/05)       Assessment/Plan:   Patient was given Vitamin K 5mg IV x 1, 12/1 @ 7435.    Warfarin resumed cautiously 12/02 due to supratherapeutic admit INR, and major " drug interactions.     Patient's INR is supratherapeutic again today at 3.75. Warfarin dose will be held.   Patient is a complicated case as she is clearly very sensitive to warfarin, not eating, and very acutely ill.   Continue to monitor for s/sx of bleeding, additional drug-drug interactions, and dietary intake.   Pharmacy will follow closely and dose adjust accordingly.     Martina Renner, PharmD Candidate 2018  12/6/2017  8:39 AM    Raine Steen PharmD  12/6/2017  9:01 AM

## 2017-12-06 NOTE — PROGRESS NOTES
[facility] Progress Note    Admission Date: 2017    Katie Crocker  3/1/1923  [id number]    Date: 2017    Meds:    Anti-Infectives     Ordered     Dose/Rate Route Frequency Start Stop    17 1212  ceFEPime (MAXIPIME) in SWFI 2g/10ml IV PUSH syringe     Ordering Provider:  Alvaro Antonio MD    2 g  over 5 Minutes Intravenous Every 12 Hours 17 0000 17 2359    17 203  metroNIDAZOLE (FLAGYL) IVPB 500 mg     Raine Steen [facility] reviewed the order on 17 08.   Ordering Provider:  Alvaro Antonio MD    500 mg  100 mL/hr over 60 Minutes Intravenous Every 8 Hours 17 2200 12/10/17 2159    17 1651  doxycycline (VIBRAMYCIN) 100 mg in sodium chloride 0.9 % 250 mL IVPB     Ordering Provider:  Jerry Adames MD    100 mg  over 60 Minutes Intravenous Every 12 Hours 17 1800 17 1759    17 1553  meropenem (MERREM) 1 g/100 mL 0.9% NS VTB (mbp)     Ordering Provider:  MOODY Ruelas    1 g  over 30 Minutes Intravenous Once 17 1630 17 1736    17 1142  vancomycin 1250 mg/250 mL 0.9% NS IVPB ([facility])     Ordering Provider:  Jacky Condon MD    20 mg/kg × 59.9 kg  over 90 Minutes Intravenous Once 17 1144 17 1444    17 1142  piperacillin-tazobactam (ZOSYN) 4.5 g in iso-osmotic dextrose 100 mL IVPB (premix)     Ordering Provider:  Jacky Condon MD    4.5 g Intravenous Once 17 1143 17 1233          CC:  Sepsis, pna, UTI    SUBJECTIVE:  No fevers but her white blood cell count has continued to increase.  Chest x-ray showed increased bilateral infiltrate chest are today.  Blood cultures ×2 and urinalysis ordered today.  No diarrhea.  Both of her daughters are at the bedside today.  Patient complains of being cold.    PE:   Vital Signs  Temp (24hrs), Av.9 °F (37.2 °C), Min:98.2 °F (36.8 °C), Max:99.8 °F (37.7 °C)    Temp  Min: 98.2 °F (36.8 °C)  Max: 99.8 °F (37.7 °C)  BP  Min: 121/52  Max:  158/116  Pulse  Min: 86  Max: 118  Resp  Min: 16  Max: 20  SpO2  Min: 91 %  Max: 92 %    GENERAL: Awake but slow to respond.  HEART: RRR; No murmur, rubs, gallops.   LUNGS: Diminished at lung bilaterally, worse. Normal respiratory effort. Nonlabored.  ABDOMEN: Soft, nontender, nondistended. Positive bowel sounds. No rebound or guarding.  EXT:  No cyanosis, clubbing or edema. No cord.  : Genitalia generally unremarkable.  SKIN: Warm and dry without cutaneous eruptions on Inspection/palpation.    NEURO: Less alert, moves limbs    Laboratory Data      Results from last 7 days  Lab Units 12/05/17  0449 12/04/17  0505 12/03/17  0508   WBC 10*3/mm3 18.41* 16.23* 12.54*   HEMOGLOBIN g/dL 10.2* 9.3* 9.3*   HEMATOCRIT % 31.4* 29.6* 28.6*   PLATELETS 10*3/mm3 275 307 270       Results from last 7 days  Lab Units 12/05/17  0926   SODIUM mmol/L 140   POTASSIUM mmol/L 3.6   CHLORIDE mmol/L 110*   CO2 mmol/L 20.0   BUN mg/dL 20   CREATININE mg/dL 1.00   GLUCOSE mg/dL 97   CALCIUM mg/dL 8.8       Results from last 7 days  Lab Units 12/03/17  0530   ALK PHOS U/L 61   BILIRUBIN mg/dL 0.4   ALT (SGPT) U/L 12   AST (SGOT) U/L 27       Results from last 7 days  Lab Units 12/02/17  0540   SED RATE mm/hr 88*       Results from last 7 days  Lab Units 12/02/17  0529   CRP mg/dL 40.55*       Results from last 7 days  Lab Units 12/02/17  0540   LACTATE mmol/L 1.5             Estimated Creatinine Clearance: 33.7 mL/min (by C-G formula based on Cr of 1).    Pro calcitonin 1.37, down from initial of 12    Microbiology:  Blood Culture   Date Value Ref Range Status   12/01/2017 No growth at 4 days  Preliminary   x2 sets    Urine Culture   Date Value Ref Range Status   12/01/2017 >100,000 CFU/mL Escherichia coli (A)  Final        Repeat blood cultures ×2 are pending         Radiology:  Imaging Results (last 72 hours)     Procedure Component Value Units Date/Time    XR Chest 1 View [[id number]] Collected:  12/04/17 1328     Updated:  12/04/17 2687     Narrative:       EXAMINATION: XR CHEST 1 VW- 12/04/2017     INDICATION: R50.9-Fever, unspecified; J18.9-Pneumonia, unspecified  organism; E87.70-Fluid overload, unspecified; I50.9-Heart failure,  unspecified; N39.0-Urinary tract infection, site not specified;  R09.02-Hypoxemia; I48.2-Chronic atrial fibrillation      COMPARISON: 12/01/2017     FINDINGS:   1. Bilateral diffuse ill-defined airspace opacities are seen diffusely  throughout the mid and lower lung zones with coalescent consolidation at  the bases and a left base effusion.  2. There is cardiomegaly.           Impression:       Compared to previous studies of 3 days ago, the edema and  the ill-defined airspace opacities seen most densely in the mid and  lower lung zones bilaterally with high-grade consolidation at the left  base appear to be worse and have progressed somewhat.     D:  12/04/2017  E:  12/04/2017     This report was finalized on 12/4/2017 1:57 PM by Dr. Blue Gallo MD.           I personally reviewed the film.      Discussion:   89 yo female was admitted 12/1 with sepsis, pneumonia, and UTI for IV antibiotics.  She has a h/o recurrent pneumonia and MDR UTIs.  A urine culture from 5/4/15 was positive for E. coli at [facility], VRE at [facility], MDR E. coli at [facility].      Problem List:   --Severe sepsis POA with ARF, leukocytosis, fever, encephalopathy, and elevated procalcitonin.  Ongoing worsening leukocytosis.  --Acute complicated recurrent UTI, cx with E. coli.  [H/o MDR UTIs; including MDR E. Coli and VRE at [facility] in 2015 - I reviewed outside records.]  --Basilar pneumonia - concern for aspiration - CXR worse  --ARF - resolved  --Leukocytosis/neutrophilia - again worse   --Fever - improved  --Acute encephalopathy, probably metabolic.  --Acute probable systolic CHF  --Hypothyroidism  --Bipolar disorder  --H/o CVA  --h/o chronic coumadin       PLAN/RECOMMENDATIONS:   Add vancomycin for empiric MRSA coverage.  Add meropenem and discontinue  cefepime and Flagyl  Continue doxycycline     Complicated case, prognosis guarded.  Discussed with pt's daughters at bedside.  They understand risk of complications with antibiotics, including renal failure.  I discussed with them that this may be a terminal event in the understand.  We agreed to give her around 48 hours on the new antibiotics.  If she does not improve, palliative consultation may be appropriate.    Alvaro Antonio MD  12/5/2017  7:22 PM

## 2017-12-06 NOTE — PROGRESS NOTES
Pharmacy Consult-Vancomycin Dosing  Katie Crocker is a  89 y.o. female receiving vancomycin therapy.     Indication: sepsis  Consulting Provider:  Dr. RITA Antonio (I.D.)  Goal Trough: 15~20    Current Antimicrobial Therapy  Doxycycline 100 mg IV q12h (12/01-12/15)  Merrem 1g IV q8h (12/06-12/11)    S/p: cefepime + flagyl (x 4 days)     Anti-Infectives       Ordered     Dose/Rate Route Frequency Start Stop      12/05/17 1942  vancomycin (dosing per levels)     Ordering Provider:  Alvaro Antonio MD     Does not apply Daily 12/06/17 0900 12/13/17 0859    12/05/17 1942  vancomycin (VANCOCIN) in iso-osmotic dextrose IVPB 1 g (premix) 200 mL     Ordering Provider:  Alvaro Antonio MD    15 mg/kg × 62.1 kg  over 60 Minutes Intravenous Daily PRN 12/06/17 0900 12/12/17 0859    12/05/17 1930  meropenem (MERREM) 1 g/100 mL 0.9% NS VTB (mbp)     Ordering Provider:  Alvaro Antonio MD    1 g  over 3 Hours Intravenous Every 8 Hours 12/06/17 0200 12/11/17 0159    12/05/17 1930  meropenem (MERREM) 1 g/100 mL 0.9% NS VTB (mbp)     Ordering Provider:  Alvaro Antonio MD    1 g  over 30 Minutes Intravenous Once 12/05/17 2015 12/05/17 2046    12/05/17 1942  vancomycin IVPB 1250 mg in 250 mL NS (premix)     Ordering Provider:  Alvaro Antonio MD    20 mg/kg × 62.1 kg  over 90 Minutes Intravenous Once 12/05/17 2015 12/06/17 0000    12/05/17 1930  Pharmacy to dose vancomycin     Ordering Provider:  Alvaro Antonio MD     Does not apply Continuous PRN 12/05/17 1928 12/10/17 1927    12/01/17 1651  doxycycline (VIBRAMYCIN) 100 mg in sodium chloride 0.9 % 250 mL IVPB     Raine Steen [facility] reviewed the order on 12/06/17 0720.   Ordering Provider:  Alvaro Antonio MD    100 mg  over 60 Minutes Intravenous Every 12 Hours 12/01/17 1800 12/15/17 2359    12/01/17 1553  meropenem (MERREM) 1 g/100 mL 0.9% NS VTB (mbp)     Ordering Provider:  MOODY Ruelas    1 g  over 30 Minutes Intravenous Once 12/01/17 1630 12/01/17 1736    12/01/17 1144   "vancomycin 1250 mg/250 mL 0.9% NS IVPB ([facility])     Ordering Provider:  Jacky Condon MD    20 mg/kg × 59.9 kg  over 90 Minutes Intravenous Once 12/01/17 1144 12/01/17 1444    12/01/17 1142  piperacillin-tazobactam (ZOSYN) 4.5 g in iso-osmotic dextrose 100 mL IVPB (premix)     Ordering Provider:  Jacky Condon MD    4.5 g Intravenous Once 12/01/17 1143 12/01/17 1233            Allergies  Allergies as of 12/01/2017 - Miguel as Reviewed 12/01/2017   Allergen Reaction Noted   • Lithium  12/01/2017       Labs    Results from last 7 days   Lab Units 12/05/17  0926 12/04/17  0505 12/03/17  0530   BUN mg/dL 20 28* 40*   CREATININE mg/dL 1.00 0.90 1.60*       Results from last 7 days   Lab Units 12/05/17  0449 12/04/17  0505 12/03/17  0508   WBC 10*3/mm3 18.41* 16.23* 12.54*       Evaluation of Dosing     Last Dose Received in the ED/Outside Facility: Pt received 1250mg x1 in ER on 12/1/17.    Ht - 160 cm (63\")  Wt - 62.1 kg (136 lb 12.8 oz)    Estimated Creatinine Clearance: 33.7 mL/min (by C-G formula based on Cr of 1).    Intake & Output (last 3 days)         12/03 0701 - 12/04 0700 12/04 0701 - 12/05 0700 12/05 0701 - 12/06 0700 12/06 0701 - 12/07 0700      P.O. 770  720     I.V. (mL/kg) 900 (15.1)       IV Piggyback 730       Total Intake(mL/kg) 2400 (40.3)  720 (11.6)     Urine (mL/kg/hr) 1600 (1.1)  100 (0.1)     Stool   0 (0)     Total Output 1600   100      Net +800   +620              Unmeasured Urine Occurrence  1 x 5 x     Unmeasured Stool Occurrence  1 x 3 x             Microbiology and Radiology  Microbiology Results (last 10 days)       Procedure Component Value - Date/Time      Blood Culture - Blood, [[id number]]  (Normal) Collected:  12/05/17 1420    Lab Status:  Preliminary result Specimen:  Blood from Arm, Left Updated:  12/06/17 0303     Blood Culture No growth at less than 24 hours    Blood Culture - Blood, [[id number]]  (Normal) Collected:  12/05/17 1415    Lab Status:  Preliminary result " Specimen:  Blood from Arm, Left Updated:  12/06/17 0303     Blood Culture No growth at less than 24 hours    Influenza A & B, RT PCR - Swab, Nasopharynx [[id number]]  (Normal) Collected:  12/01/17 1703    Lab Status:  Final result Specimen:  Swab from Nasopharynx Updated:  12/01/17 1820     Influenza A PCR Not Detected     Influenza B PCR Not Detected    Eosinophil Smear - Urine, Urine, Clean Catch [[id number]]  (Normal) Collected:  12/01/17 1702    Lab Status:  Final result Specimen:  Urine from Urine, Clean Catch Updated:  12/01/17 2137     Eosinophil Smear 0 % EOS/100 Cells     Narrative:       No eosinophil seen    Blood Culture - Blood, [[id number]]  (Normal) Collected:  12/01/17 1148    Lab Status:  Preliminary result Specimen:  Blood from Arm, Left Updated:  12/05/17 1231     Blood Culture No growth at 4 days    Blood Culture - Blood, [[id number]]  (Normal) Collected:  12/01/17 1140    Lab Status:  Preliminary result Specimen:  Blood from Arm, Right Updated:  12/05/17 1231     Blood Culture No growth at 4 days    Urine Culture - Urine, Urine, Clean Catch [[id number]]  (Abnormal)  (Susceptibility) Collected:  12/01/17 1137    Lab Status:  Final result Specimen:  Urine from Urine, Catheter Updated:  12/03/17 1143     Urine Culture --      >100,000 CFU/mL Escherichia coli (A)    Susceptibility        Escherichia coli     MARCUS     Ampicillin >16 ug/ml Resistant     Ampicillin + Sulbactam >16/8 ug/ml Resistant     Aztreonam <=8 ug/ml Susceptible     Cefepime <=8 ug/ml Susceptible     Cefotaxime <=2 ug/ml Susceptible     Ceftriaxone <=8 ug/ml Susceptible     Cefuroxime sodium <=4 ug/ml Susceptible     Cephalothin 16 ug/ml Intermediate     Ertapenem <=1 ug/ml Susceptible     Gentamicin <=4 ug/ml Susceptible     Levofloxacin <=2 ug/ml Susceptible     Meropenem <=1 ug/ml Susceptible     Nitrofurantoin <=32 ug/ml Susceptible     Piperacillin + Tazobactam <=16 ug/ml Susceptible     Tetracycline 8 ug/ml Intermediate      Tobramycin <=4 ug/ml Susceptible     Trimethoprim + Sulfamethoxazole >2/38 ug/ml Resistant                            Evaluation of Levels   Lab Results   Component Value Date    BRETT 36.80 (H) 12/06/2017    ~ 6.5 hour level    Assessment/Plan:  Due to patient's age, renal function, and clinical status vancomycin is currently being dosed per level.   Patient received vancomycin 1250 mg IV 12/05 at 22:30.  Vancomycin random was 36.8 mcg/mL 12/06 at 05:00.  Level really is not useful for therapeutic monitoring as it is a 6 hour level only, however, it is essentially still at a peak level showing potentially decreased clearance.  Patient will not receive a vancomycin dose today.    Vancomycin random and BMP scheduled tomorrow am.   Monitor patient's renal function, via UOP and SCr, daily.   Pharmacy will follow closely and dose adjust as needed.     Martina Renner, PharmD Candidate 2018  12/6/2017  8:15 AM      Raine Steen, PharmD  12/6/2017  9:02 AM

## 2017-12-06 NOTE — PROGRESS NOTES
[facility] Progress Note    Admission Date: 12/1/2017    Katie Crocker  3/1/1923  [id number]    Date: 12/6/2017    Meds:    Anti-Infectives     Ordered     Dose/Rate Route Frequency Start Stop    12/05/17 1942  vancomycin (dosing per levels)     Ordering Provider:  Alvaro Antonio MD     Does not apply Daily 12/06/17 0900 12/13/17 0859    12/05/17 1942  vancomycin (VANCOCIN) in iso-osmotic dextrose IVPB 1 g (premix) 200 mL     Ordering Provider:  Alvaro Antonio MD    15 mg/kg × 62.1 kg  over 60 Minutes Intravenous Daily PRN 12/06/17 0900 12/12/17 0859    12/05/17 1930  meropenem (MERREM) 1 g/100 mL 0.9% NS VTB (mbp)     Ordering Provider:  Alvaro Antonio MD    1 g  over 3 Hours Intravenous Every 8 Hours 12/06/17 0200 12/11/17 0159    12/05/17 1930  meropenem (MERREM) 1 g/100 mL 0.9% NS VTB (mbp)     Ordering Provider:  Alvaro Antonio MD    1 g  over 30 Minutes Intravenous Once 12/05/17 2015 12/05/17 2046    12/05/17 1942  vancomycin IVPB 1250 mg in 250 mL NS (premix)     Ordering Provider:  Alvaro Antonio MD    20 mg/kg × 62.1 kg  over 90 Minutes Intravenous Once 12/05/17 2015 12/06/17 0000    12/05/17 1930  Pharmacy to dose vancomycin     Ordering Provider:  Alvaro Antonio MD     Does not apply Continuous PRN 12/05/17 1928 12/10/17 1927    12/01/17 1651  doxycycline (VIBRAMYCIN) 100 mg in sodium chloride 0.9 % 250 mL IVPB     Raine Steen [facility] reviewed the order on 12/06/17 0720.   Ordering Provider:  Alvaro Antonio MD    100 mg  over 60 Minutes Intravenous Every 12 Hours 12/01/17 1800 12/15/17 2359    12/01/17 1553  meropenem (MERREM) 1 g/100 mL 0.9% NS VTB (mbp)     Ordering Provider:  MOODY Ruelas    1 g  over 30 Minutes Intravenous Once 12/01/17 1630 12/01/17 1736    12/01/17 1142  vancomycin 1250 mg/250 mL 0.9% NS IVPB ([facility])     Ordering Provider:  Jacky Condon MD    20 mg/kg × 59.9 kg  over 90 Minutes Intravenous Once 12/01/17 1144 12/01/17 1444    12/01/17 1142  piperacillin-tazobactam (ZOSYN)  4.5 g in iso-osmotic dextrose 100 mL IVPB (premix)     Ordering Provider:  Jacky Condon MD    4.5 g Intravenous Once 17 1143 17 1233          CC:  Sepsis, pna, UTI    SUBJECTIVE:  No family at bedside today.  Pt sleeping but will awaken.  No fevers overnight.  Says again that she is cold.  On 2L NC.  Tachycardic today and increased work of breathing.  Her speech is more difficult to understand.  Palliative consultation pending.    PE:   Vital Signs  Temp (24hrs), Av.6 °F (37 °C), Min:98.2 °F (36.8 °C), Max:99 °F (37.2 °C)    Temp  Min: 98.2 °F (36.8 °C)  Max: 99 °F (37.2 °C)  BP  Min: 116/62  Max: 158/116  Pulse  Min: 91  Max: 112  Resp  Min: 16  Max: 18  SpO2  Min: 92 %  Max: 92 %    GENERAL: Asleep but will awaken.  Slurred speech, new.  Not oriented.  HEART: tachycardic, regular rhythm with ectopy; No murmur, rubs, gallops.   LUNGS: Diminished at lung bilaterally, rhonchi at right mid to lower lung fields.  Increased work of breathing.  ABDOMEN: Soft, nontender, nondistended. Positive bowel sounds. No rebound or guarding.  EXT:  No cyanosis, clubbing or edema.  SKIN: Warm and dry without cutaneous eruptions on Inspection/palpation.    NEURO: Less alert, moves limbs    Laboratory Data      Results from last 7 days  Lab Units 17  0449 17  0505 17  0508   WBC 10*3/mm3 18.41* 16.23* 12.54*   HEMOGLOBIN g/dL 10.2* 9.3* 9.3*   HEMATOCRIT % 31.4* 29.6* 28.6*   PLATELETS 10*3/mm3 275 307 270       Results from last 7 days  Lab Units 17  0926   SODIUM mmol/L 140   POTASSIUM mmol/L 3.6   CHLORIDE mmol/L 110*   CO2 mmol/L 20.0   BUN mg/dL 20   CREATININE mg/dL 1.00   GLUCOSE mg/dL 97   CALCIUM mg/dL 8.8       Results from last 7 days  Lab Units 17  0530   ALK PHOS U/L 61   BILIRUBIN mg/dL 0.4   ALT (SGPT) U/L 12   AST (SGOT) U/L 27       Results from last 7 days  Lab Units 17  0540   SED RATE mm/hr 88*       Results from last 7 days  Lab Units 17  0529   CRP  mg/dL 40.55*       Results from last 7 days  Lab Units 12/02/17  0540   LACTATE mmol/L 1.5           Results from last 7 days  Lab Units 12/06/17  0501   VANCOMYCIN TR mcg/mL 36.80*     Level is not trough - more like peak      Estimated Creatinine Clearance: 33.7 mL/min (by C-G formula based on Cr of 1).    Pro calcitonin 1.37, down from initial of 12    Brief Urine Lab Results  (Last result in the past 365 days)      Color   Clarity   Blood   Leuk Est   Nitrite   Protein   CREAT   Urine HCG        12/05/17 2009 Yellow Clear Negative Small (1+)(A) Negative 30 mg/dL (1+)(A)                 Microbiology:      Urine Culture - Urine, Urine, Clean Catch [[id number]] (Normal) Collected: 12/05/17 2009       Lab Status: Preliminary result Specimen: Urine from Urine, Clean Catch Updated: 12/06/17 0816        Urine Culture No growth       Blood Culture - Blood, [[id number]] (Normal) Collected: 12/05/17 1420       Lab Status: Preliminary result Specimen: Blood from Arm, Left Updated: 12/06/17 0303        Blood Culture No growth at less than 24 hours       Blood Culture - Blood, [[id number]] (Normal) Collected: 12/05/17 1415       Lab Status: Preliminary result Specimen: Blood from Arm, Left Updated: 12/06/17 0303        Blood Culture No growth at less than 24 hours       Influenza A & B, RT PCR - Swab, Nasopharynx [[id number]] (Normal) Collected: 12/01/17 1703       Lab Status: Final result Specimen: Swab from Nasopharynx Updated: 12/01/17 1820        Influenza A PCR Not Detected        Influenza B PCR Not Detected       Eosinophil Smear - Urine, Urine, Clean Catch [[id number]] (Normal) Collected: 12/01/17 1702       Lab Status: Final result Specimen: Urine from Urine, Clean Catch Updated: 12/01/17 2137        Eosinophil Smear 0 % EOS/100 Cells        Narrative:         No eosinophil seen       Blood Culture - Blood, [[id number]] (Normal) Collected: 12/01/17 1148       Lab Status: Final result Specimen: Blood from Arm, Left Updated:  12/06/17 1231        Blood Culture No growth at 5 days       Blood Culture - Blood, [[id number]] (Normal) Collected: 12/01/17 1140       Lab Status: Final result Specimen: Blood from Arm, Right Updated: 12/06/17 1231        Blood Culture No growth at 5 days       Urine Culture - Urine, Urine, Clean Catch [[id number]] (Abnormal)  Collected: 12/01/17 1137       Lab Status: Final result Specimen: Urine from Urine, Catheter Updated: 12/03/17 1143        Urine Culture --         >100,000 CFU/mL Escherichia coli (A)             Radiology:  Imaging Results (last 72 hours)     Procedure Component Value Units Date/Time    XR Chest 1 View [[id number]] Collected:  12/04/17 1328     Updated:  12/04/17 1359    Narrative:       EXAMINATION: XR CHEST 1 VW- 12/04/2017     INDICATION: R50.9-Fever, unspecified; J18.9-Pneumonia, unspecified  organism; E87.70-Fluid overload, unspecified; I50.9-Heart failure,  unspecified; N39.0-Urinary tract infection, site not specified;  R09.02-Hypoxemia; I48.2-Chronic atrial fibrillation      COMPARISON: 12/01/2017     FINDINGS:   1. Bilateral diffuse ill-defined airspace opacities are seen diffusely  throughout the mid and lower lung zones with coalescent consolidation at  the bases and a left base effusion.  2. There is cardiomegaly.           Impression:       Compared to previous studies of 3 days ago, the edema and  the ill-defined airspace opacities seen most densely in the mid and  lower lung zones bilaterally with high-grade consolidation at the left  base appear to be worse and have progressed somewhat.     D:  12/04/2017  E:  12/04/2017     This report was finalized on 12/4/2017 1:57 PM by Dr. Blue Gallo MD.           I personally reviewed the film.      Discussion:   89 yo female was admitted 12/1 with sepsis, pneumonia, and UTI.  She has a h/o recurrent pneumonia and MDR UTIs.     Problem List:   --Severe sepsis POA with ARF, leukocytosis, fever, encephalopathy, and elevated  procalcitonin.  Ongoing with worsening leukocytosis.  --Acute complicated recurrent UTI, cx with E. coli.  [H/o MDR UTIs; including MDR E. Coli and VRE at [facility] in 2015 - I reviewed outside records.]  --Basilar pneumonia - concern for aspiration - CXR worsening  --ARF - resolved  --Leukocytosis/neutrophilia - again worse   --Fever - improved  --Acute encephalopathy, metabolic.  --H/o CVA  --h/o chronic coumadin       PLAN/RECOMMENDATIONS:   Continue vancomycin, meropenem, and doxycycline - abx broadened yesterday   Recheck WBC and Cr in am  Appears to be clinically worsening  Not much else to add  Palliative consultation  Complicated case, prognosis guarded.  Discussed with pt's daughters at bedside yesterday.  They understand risk of complications with aggressive antibiotics, including renal failure.  I discussed with them that this may be a terminal event and they understand.   Patient is DNR.  Discussed with pharmacy.    Alvaro Antonio MD  12/6/2017  1:42 PM

## 2017-12-06 NOTE — NURSING NOTE
JEANA mattress ordered for at risk patient from [facility]. Please consult WOCN for any needs or concerns.

## 2017-12-06 NOTE — PLAN OF CARE
Problem: Patient Care Overview (Adult)  Goal: Plan of Care Review  Outcome: Ongoing (interventions implemented as appropriate)    12/06/17 1235   Coping/Psychosocial Response Interventions   Plan Of Care Reviewed With (FLORENCIO URIOSTEGUI discussed with daughter)   Patient Care Overview   Progress no change   Outcome Evaluation   Outcome Summary/Follow up Plan Palliative consulted for GOC; APRN met with one daughter at bedside. Continue current POC, monitor progress or decline, continue GOC discussion.

## 2017-12-06 NOTE — PROGRESS NOTES
[facility] Medicine Services  PROGRESS NOTE    Patient Name: Katie Crocker  : 3/1/1923  MRN: [id number]    Date of Admission: 2017  Length of Stay: 5  Primary Care Physician: Jose Armando Sharif MD    Subjective   Subjective     CC:  Sepsis (present on admission)    Subjective:  No family at bedside. Pt opens eyes and says she is doing ok today just wants to sleep, denies pain     Review of Systems    Otherwise ROS is negative except as mentioned above.    Objective   Objective     Vital Signs:   Temp:  [98.2 °F (36.8 °C)-99 °F (37.2 °C)] 98.4 °F (36.9 °C)  Heart Rate:  [] 88  Resp:  [16-18] 16  BP: (112-158)/() 112/49        Physical Exam:  Constitutional: opens eyes responsive   Eyes: PERRLA, sclerae anicteric, no conjunctival injection  HENT: NCAT, mucous membranes moist  Neck: Supple, no thyromegaly, no lymphadenopathy, trachea midline  Respiratory: decreased bs bilaterally, nonlabored respirations   Cardiovascular: RRR, no murmurs, rubs, or gallops, palpable pedal pulses bilaterally  Gastrointestinal: Positive bowel sounds, soft, nontender, nondistended  Musculoskeletal: No bilateral ankle edema, no clubbing or cyanosis to extremities  Psychiatric: sleepy, much less lethargic today   Skin: No rashes    Results Reviewed:  I have personally reviewed current lab, radiology, and data and agree.      Results from last 7 days  Lab Units 17  0501 17  0449 17  0505 17  0508   WBC 10*3/mm3  --  18.41* 16.23* 12.54*   HEMOGLOBIN g/dL  --  10.2* 9.3* 9.3*   HEMATOCRIT %  --  31.4* 29.6* 28.6*   PLATELETS 10*3/mm3  --  275 307 270   INR  3.75 3.03 1.93 1.26       Results from last 7 days  Lab Units 17  0926 17  0505 17  0530 17  0529 17  1219   SODIUM mmol/L 140 141 141 133 133   POTASSIUM mmol/L 3.6 3.4* 3.6 3.8 4.1   CHLORIDE mmol/L 110* 115* 111* 101 101   CO2 mmol/L 20.0 19.0* 19.0* 20.0 22.0   BUN mg/dL 20 28*  40* 46* 40*   CREATININE mg/dL 1.00 0.90 1.60* 2.10* 2.40*   GLUCOSE mg/dL 97 83 74 74 104*   CALCIUM mg/dL 8.8 8.6* 9.1 8.0* 8.4*   ALT (SGPT) U/L  --   --  12 6* 6*   AST (SGOT) U/L  --   --  27 22 26     No results found for: BNP  pH, Arterial   Date Value Ref Range Status   12/04/2017 7.377 7.350 - 7.450 pH units Final       Microbiology Results Abnormal     Procedure Component Value - Date/Time    Blood Culture - Blood, [[id number]]  (Normal) Collected:  12/05/17 1420    Lab Status:  Preliminary result Specimen:  Blood from Arm, Left Updated:  12/06/17 1501     Blood Culture No growth at 24 hours    Blood Culture - Blood, [[id number]]  (Normal) Collected:  12/05/17 1415    Lab Status:  Preliminary result Specimen:  Blood from Arm, Left Updated:  12/06/17 1501     Blood Culture No growth at 24 hours    Blood Culture - Blood, [[id number]]  (Normal) Collected:  12/01/17 1148    Lab Status:  Final result Specimen:  Blood from Arm, Left Updated:  12/06/17 1231     Blood Culture No growth at 5 days    Blood Culture - Blood, [[id number]]  (Normal) Collected:  12/01/17 1140    Lab Status:  Final result Specimen:  Blood from Arm, Right Updated:  12/06/17 1231     Blood Culture No growth at 5 days    Urine Culture - Urine, Urine, Clean Catch [[id number]]  (Normal) Collected:  12/05/17 2009    Lab Status:  Preliminary result Specimen:  Urine from Urine, Clean Catch Updated:  12/06/17 0816     Urine Culture No growth    Urine Culture - Urine, Urine, Clean Catch [[id number]]  (Abnormal)  (Susceptibility) Collected:  12/01/17 1137    Lab Status:  Final result Specimen:  Urine from Urine, Catheter Updated:  12/03/17 1143     Urine Culture --      >100,000 CFU/mL Escherichia coli (A)    Susceptibility      Escherichia coli     MARCUS     Ampicillin >16 ug/ml Resistant     Ampicillin + Sulbactam >16/8 ug/ml Resistant     Aztreonam <=8 ug/ml Susceptible     Cefepime <=8 ug/ml Susceptible     Cefotaxime <=2 ug/ml Susceptible      Ceftriaxone <=8 ug/ml Susceptible     Cefuroxime sodium <=4 ug/ml Susceptible     Cephalothin 16 ug/ml Intermediate     Ertapenem <=1 ug/ml Susceptible     Gentamicin <=4 ug/ml Susceptible     Levofloxacin <=2 ug/ml Susceptible     Meropenem <=1 ug/ml Susceptible     Nitrofurantoin <=32 ug/ml Susceptible     Piperacillin + Tazobactam <=16 ug/ml Susceptible     Tetracycline 8 ug/ml Intermediate     Tobramycin <=4 ug/ml Susceptible     Trimethoprim + Sulfamethoxazole >2/38 ug/ml Resistant                    Eosinophil Smear - Urine, Urine, Clean Catch [[id number]]  (Normal) Collected:  12/01/17 1702    Lab Status:  Final result Specimen:  Urine from Urine, Clean Catch Updated:  12/01/17 2137     Eosinophil Smear 0 % EOS/100 Cells     Narrative:       No eosinophil seen    Influenza A & B, RT PCR - Swab, Nasopharynx [[id number]]  (Normal) Collected:  12/01/17 1703    Lab Status:  Final result Specimen:  Swab from Nasopharynx Updated:  12/01/17 1820     Influenza A PCR Not Detected     Influenza B PCR Not Detected          Imaging Results (last 24 hours)     ** No results found for the last 24 hours. **        Results for orders placed during the hospital encounter of 12/01/17   Adult Transthoracic Echo Complete W/ Cont if Necessary Per Protocol    Narrative · Left ventricular systolic function is normal. Estimated EF = 55%.  · Mild aortic valve regurgitation is present.  · Mild-to-moderate mitral valve regurgitation is present  · Moderate to severe tricuspid valve regurgitation is present.  · Calculated right ventricular systolic pressure from tricuspid   regurgitation is 55 mmHg.          I have reviewed the medications.    Assessment/Plan   Assessment / Plan     Hospital Problem List     * (Principal)Sepsis    HCAP (healthcare-associated pneumonia)    Urinary tract infection    Hypertension    Hyperlipidemia    GERD (gastroesophageal reflux disease)    CHF (congestive heart failure)    Depression    Gout     Hypothyroidism    Hemiparesis affecting right side as late effect of stroke    Atrial fibrillation    Supratherapeutic INR    Acute on chronic kidney failure    Anemia             Brief Hospital Course to date:  Katie Crocker is a 89 y.o. female with past medical history significant for hypertension, hyperlipidemia, hypothyroidism, atrial fibrillation, history of CVA, chronic kidney disease.  The patient was admitted for altered mental status and she was found to have pneumonia and UTI at the emergency room.      Assessment & Plan:  * Severe Sepsis POA  - ID following - high procalcitonin on admission, clincally not improving. I have had a discussion with daughter and she is aware of the severeity of her mothers condition.    *HCAP with CXR evidence of consolidation, on antibiotics and currently afebrile.cefepime/doxy/flagyl, ID following and managing abx , ABG reveiewed,  CXR worsening.and elevated WBC-  ABX increased to VAnc/Merrem/Doxy per ID today     * E Coli UTI (POA)- Abx per ID     * Altered mental status secondary to sepsis.  stable    * Dysphagia.  Patient failed swallow test twice but then took Fees and passed. ok'd diet per ST .     * Hypertension.    *Leukocytosis- still going up? Reaspiration? No diarrhea . No fever. reculture blood negative, check UA negative cx pending.     *Hypokalemia- replaced today     * Congestive heart failure - EF 55%, mod/severe tricuspid regurg, elevated RVSP,  will lightly give fluids. Pt is not taking anything orally      * Acute on Chronic Kidney Disease - improving - back to baseline now    * Supratherapeutic INR on admission -  back on warfarin, up to 3.75 today, pharmacy dosing.     * Plan to continue medical management x 2 more days then if no improvement to hospice or back to nh with palliative. Per family     DVT Prophylaxis:  Anticoagulated with warfarin    CODE STATUS: Conditional Code, DNR     Disposition: TBD. Overall poor prognosis     Ita Sevilla,  DO  12/06/17  4:25 PM

## 2017-12-06 NOTE — PLAN OF CARE
Problem: Patient Care Overview (Adult)  Goal: Plan of Care Review  Outcome: Ongoing (interventions implemented as appropriate)    12/06/17 0311   Coping/Psychosocial Response Interventions   Plan Of Care Reviewed With patient   Patient Care Overview   Progress no change   Outcome Evaluation   Outcome Summary/Follow up Plan pt has slept all night, but arouses when spoken to. pt takes pills whole in applesauce. no complaints overnight.         Problem: Pressure Ulcer (Adult)  Goal: Signs and Symptoms of Listed Potential Problems Will be Absent or Manageable (Pressure Ulcer)  Outcome: Ongoing (interventions implemented as appropriate)    Problem: Fall Risk (Adult)  Goal: Identify Related Risk Factors and Signs and Symptoms  Outcome: Ongoing (interventions implemented as appropriate)  Goal: Absence of Falls  Outcome: Ongoing (interventions implemented as appropriate)

## 2017-12-06 NOTE — PROGRESS NOTES
Continued Stay Note  [facility]     Patient Name: Katie Crocker  MRN: [id number]  Today's Date: 12/6/2017    Admit Date: 12/1/2017          Discharge Plan       12/06/17 1602    Case Management/Social Work Plan    Plan ONGOING    Patient/Family In Agreement With Plan yes    Additional Comments Met with pt and family at bedside to f/u DCP.  Noted consult to Palliative Care and MD notes that pt may have a terminal process.  CM will follow from periphery.  Please call if needed.  ext [phone].              Discharge Codes     None        Expected Discharge Date and Time     Expected Discharge Date Expected Discharge Time    Dec 6, 2017             Hermelinda Hall

## 2017-12-06 NOTE — CONSULTS
"Palliative Care Initial Consult Note    Patient Name:  Katie Crocker   : 3/1/1923   Sex: female    Patient Care Team:  Jose Armando Sharif MD as PCP - General (Internal Medicine)    Advance care planning discussed: yes, with daughter  Code Status: DNR/DNI  Advance Directive: has EMS DNR  Surrogate decision maker: pt has two daughters    Referring Provider: Dr. Sevilla  Reason for Consult: goals of care, symptom mgmt    Subjective     Patient is a 89 y.o. female presents to ED with fever.    PMHx: atrial fibrillation on Coumadin, CVA with right side hemiparesis, hypertension, CHF (EF unknown), frequent MDR urinary tract infections     Resident of [facility]    Patient's response when asked, \"are you uncomfortable because of pain?\": no    BM:  - having multiple BMs daily    Review of Systems  Review of Systems   Constitutional: Positive for fatigue.        Pt stated, \"Hi, nice to meet you.\" Speech is soft and slow but understandable. Maintains eye contact. Answered questions appropriately; denies pain or discomfort at this time. No complaints.    HENT: Positive for trouble swallowing.    Respiratory: Positive for shortness of breath.    Gastrointestinal: Negative for nausea and vomiting.       History  Past Medical History:   Diagnosis Date   • Bipolar disorder    • CHF (congestive heart failure)    • Dehydration    • Depression    • Disease of thyroid gland    • Diverticulosis    • GERD (gastroesophageal reflux disease)    • Gout    • Hyperkalemia    • Hyperlipidemia    • Hypertension    • Insomnia    • Pneumonia    • Renal disorder    • Sepsis    • Stroke    • SVT (supraventricular tachycardia)    • TIA (transient ischemic attack)    • UTI (urinary tract infection)      Past Surgical History:   Procedure Laterality Date   • BACK SURGERY     • HYSTERECTOMY     • REPLACEMENT TOTAL KNEE BILATERAL       Current Facility-Administered Medications   Medication Dose Route Frequency Provider Last Rate " Last Dose   • acetaminophen (TYLENOL) tablet 650 mg  650 mg Oral Q6H PRN MOODY Lee   650 mg at 12/04/17 2140   • allopurinol (ZYLOPRIM) tablet 150 mg  150 mg Oral Daily MOODY Ruelas   150 mg at 12/06/17 1032   • atorvastatin (LIPITOR) tablet 10 mg  10 mg Oral Daily MOODY Ruelas   10 mg at 12/06/17 1032   • citalopram (CeleXA) tablet 20 mg  20 mg Oral Daily MOODY Ruelas   20 mg at 12/06/17 1031   • dextrose 5 % and sodium chloride 0.9 % infusion  75 mL/hr Intravenous Continuous Ita L Atkins, DO 75 mL/hr at 12/05/17 1600 75 mL/hr at 12/05/17 1600   • digoxin (LANOXIN) tablet 125 mcg  125 mcg Oral Daily MOODY Ruelas   125 mcg at 12/06/17 1354   • divalproex (DEPAKOTE) DR tablet 500 mg  500 mg Oral Daily MOODY Ruelas   500 mg at 12/06/17 1034   • doxycycline (VIBRAMYCIN) 100 mg in sodium chloride 0.9 % 250 mL IVPB  100 mg Intravenous Q12H Alvaro Antonio MD   100 mg at 12/06/17 0504   • lactobacillus acidophilus (RISAQUAD) capsule 1 capsule  1 capsule Oral Daily EVETTE Pepper   1 capsule at 12/06/17 1032   • levothyroxine (SYNTHROID, LEVOTHROID) tablet 50 mcg  50 mcg Oral Daily MOODY Ruelas   50 mcg at 12/06/17 1032   • meropenem (MERREM) 1 g/100 mL 0.9% NS VTB (mbp)  1 g Intravenous Q8H Alvaro Antonio MD   1 g at 12/06/17 1035   • mirtazapine (REMERON) tablet 7.5 mg  7.5 mg Oral Nightly MOODY Ruelas   7.5 mg at 12/05/17 2017   • ondansetron (ZOFRAN) injection 4 mg  4 mg Intravenous Q6H PRN MOODY Ruelas       • Pharmacy to dose vancomycin   Does not apply Continuous PRN Alvaro Antonio MD       • Pharmacy to dose warfarin   Does not apply Continuous PRN MOODY Ruelas       • polyethylene glycol (MIRALAX) powder 17 g  17 g Oral Daily PRN MOODY Ruelas       • sennosides-docusate sodium (SENOKOT-S) 8.6-50 MG tablet 2 tablet  2 tablet Oral BID PRN MOODY Ruelas       • sodium chloride 0.9 % flush 1-10 mL   1-10 mL Intravenous PRN MOODY Ruelas       • sodium chloride 0.9 % flush 10 mL  10 mL Intravenous PRN Jacky Condon MD       • vancomycin (dosing per levels)   Does not apply Daily Alvaro Antonio MD       • vancomycin (VANCOCIN) in iso-osmotic dextrose IVPB 1 g (premix) 200 mL  15 mg/kg Intravenous Daily PRN Alvaro Antonio MD       • warfarin (COUMADIN) (dosing per levels)   Does not apply Daily Raine Steen [facility]   Stopped at 12/05/17 1800       dextrose 5 % and sodium chloride 0.9 % 75 mL/hr Last Rate: 75 mL/hr (12/05/17 1600)   Pharmacy to dose vancomycin     Pharmacy to dose warfarin       •  acetaminophen  •  ondansetron  •  Pharmacy to dose vancomycin  •  Pharmacy to dose warfarin  •  polyethylene glycol  •  sennosides-docusate sodium  •  sodium chloride  •  sodium chloride  •  vancomycin  Allergies   Allergen Reactions   • Lithium      Family History   Problem Relation Age of Onset   • Stroke Mother    • Pneumonia Father      Social History     Social History   • Marital status:      Spouse name: N/A   • Number of children: N/A   • Years of education: N/A     Occupational History   • Not on file.     Social History Main Topics   • Smoking status: Never Smoker   • Smokeless tobacco: Never Used   • Alcohol use No   • Drug use: No   • Sexual activity: Defer     Other Topics Concern   • Not on file     Social History Narrative    Lives in [facility], bed and wheelchair bound x 6 years       Objective     Vital Signs  Temp:  [98.2 °F (36.8 °C)-99 °F (37.2 °C)] 99 °F (37.2 °C)  Heart Rate:  [] 107  Resp:  [16-18] 16  BP: (116-158)/() 138/97    PPS: Palliative Performance Scale score as of 12/6/2017, 3:42 PM is 20% based on the following measures:   Ambulation: Totally bed bound  Activity and Evidence of Disease: Unable to do any work, extensive evidence of disease  Self-Care: Total care  Intake:Minimal sips  LOC: Full, drowsy or confusion    Physical Exam:  Physical Exam    Constitutional: No distress.   HENT:   Head: Normocephalic and atraumatic.   Eyes: EOM are normal.   Neck: No tracheal deviation present.   Cardiovascular: Exam reveals no gallop and no friction rub.    irr irr  HR 80-110s   Pulmonary/Chest: Effort normal. No respiratory distress. She has no wheezes.   Shallow, even   Abdominal: Soft. Bowel sounds are normal. She exhibits no distension. There is no tenderness.   Musculoskeletal: She exhibits no edema.   Neurological: She is alert.   Skin: Skin is dry. She is not diaphoretic. There is pallor.   Psychiatric:   Unable to fully evaluate       Results Review:   No results found for: HGBA1C    Lab Results   Component Value Date    GLUCOSE 97 12/05/2017    BUN 20 12/05/2017    CREATININE 1.00 12/05/2017    EGFRIFNONA 52 (L) 12/05/2017    BCR 20.0 12/05/2017    K 3.6 12/05/2017    CO2 20.0 12/05/2017    CALCIUM 8.8 12/05/2017    ALBUMIN 3.40 12/03/2017    LABIL2 1.0 (L) 12/03/2017    AST 27 12/03/2017    ALT 12 12/03/2017       WBC   Date Value Ref Range Status   12/05/2017 18.41 (H) 3.50 - 10.80 10*3/mm3 Final     RBC   Date Value Ref Range Status   12/05/2017 3.25 (L) 3.89 - 5.14 10*6/mm3 Final     Hemoglobin   Date Value Ref Range Status   12/05/2017 10.2 (L) 11.5 - 15.5 g/dL Final     Hematocrit   Date Value Ref Range Status   12/05/2017 31.4 (L) 34.5 - 44.0 % Final     MCV   Date Value Ref Range Status   12/05/2017 96.6 80.0 - 99.0 fL Final     MCH   Date Value Ref Range Status   12/05/2017 31.4 (H) 27.0 - 31.0 pg Final     MCHC   Date Value Ref Range Status   12/05/2017 32.5 32.0 - 36.0 g/dL Final     RDW   Date Value Ref Range Status   12/05/2017 16.3 (H) 11.3 - 14.5 % Final     RDW-SD   Date Value Ref Range Status   12/05/2017 56.4 (H) 37.0 - 54.0 fl Final     MPV   Date Value Ref Range Status   12/05/2017 9.6 6.0 - 12.0 fL Final     Platelets   Date Value Ref Range Status   12/05/2017 275 150 - 450 10*3/mm3 Final     Neutrophil %   Date Value Ref Range Status    12/05/2017 77.8 (H) 41.0 - 71.0 % Final     Lymphocyte %   Date Value Ref Range Status   12/05/2017 9.4 (L) 24.0 - 44.0 % Final     Monocyte %   Date Value Ref Range Status   12/05/2017 8.8 0.0 - 12.0 % Final     Eosinophil %   Date Value Ref Range Status   12/05/2017 0.3 0.0 - 3.0 % Final     Basophil %   Date Value Ref Range Status   12/05/2017 0.4 0.0 - 1.0 % Final     Immature Grans %   Date Value Ref Range Status   12/05/2017 3.3 (H) 0.0 - 0.6 % Final     Neutrophils, Absolute   Date Value Ref Range Status   12/05/2017 14.32 (H) 1.50 - 8.30 10*3/mm3 Final     Lymphocytes, Absolute   Date Value Ref Range Status   12/05/2017 1.73 0.60 - 4.80 10*3/mm3 Final     Monocytes, Absolute   Date Value Ref Range Status   12/05/2017 1.62 (H) 0.00 - 1.00 10*3/mm3 Final     Eosinophils, Absolute   Date Value Ref Range Status   12/05/2017 0.05 0.00 - 0.30 10*3/mm3 Final     Basophils, Absolute   Date Value Ref Range Status   12/05/2017 0.08 0.00 - 0.20 10*3/mm3 Final     Immature Grans, Absolute   Date Value Ref Range Status   12/05/2017 0.61 (H) 0.00 - 0.03 10*3/mm3 Final       Principal Problem:    Sepsis  Active Problems:    HCAP (healthcare-associated pneumonia)    Urinary tract infection    Hypertension    Hyperlipidemia    GERD (gastroesophageal reflux disease)    CHF (congestive heart failure)    Depression    Gout    Hypothyroidism    Hemiparesis affecting right side as late effect of stroke    Atrial fibrillation    Supratherapeutic INR    Acute on chronic kidney failure    Anemia      Assessment/Plan   Assessment/Plan:     Dyspnea - controlled on O2, which is new for pt  Fatigue - chronic, progressive, unavoidable   Dysphagia - modified diet - pt tolerating current diet - fed while in room    Discussion at bedside with one of pt's two daughter. Plan is to continue with current medical mgmt for two days and then re-evaluate. Daughter is comfortable with knowing what her mom (the pt) would want and is realistic on  prognosis. Discussed back to facility with Palliative Care or Hospice; mentioned inpt hospice if pt were to acutely decline. Palliative will continue to follow. Contact information given to daughter.     Total Visit Time: 60 minutes  Face to Face Time: 30 minutes    MOODY Reyes  (C) [phone]  (O) [phone]  12/06/17  3:36 PM

## 2017-12-07 LAB
ANION GAP SERPL CALCULATED.3IONS-SCNC: 7 MMOL/L (ref 3–11)
BACTERIA SPEC AEROBE CULT: ABNORMAL
BACTERIA SPEC AEROBE CULT: ABNORMAL
BASOPHILS # BLD MANUAL: 0.15 10*3/MM3 (ref 0–0.2)
BASOPHILS NFR BLD AUTO: 1 % (ref 0–1)
BUN BLD-MCNC: 14 MG/DL (ref 9–23)
BUN/CREAT SERPL: 15.6 (ref 7–25)
CALCIUM SPEC-SCNC: 8.5 MG/DL (ref 8.7–10.4)
CHLORIDE SERPL-SCNC: 108 MMOL/L (ref 99–109)
CO2 SERPL-SCNC: 24 MMOL/L (ref 20–31)
CREAT BLD-MCNC: 0.9 MG/DL (ref 0.6–1.3)
DEPRECATED RDW RBC AUTO: 57.6 FL (ref 37–54)
EOSINOPHIL # BLD MANUAL: 0 10*3/MM3 (ref 0.1–0.3)
EOSINOPHIL NFR BLD MANUAL: 0 % (ref 0–3)
ERYTHROCYTE [DISTWIDTH] IN BLOOD BY AUTOMATED COUNT: 17 % (ref 11.3–14.5)
GFR SERPL CREATININE-BSD FRML MDRD: 58 ML/MIN/1.73
GLUCOSE BLD-MCNC: 87 MG/DL (ref 70–100)
HCT VFR BLD AUTO: 29.9 % (ref 34.5–44)
HGB BLD-MCNC: 9.4 G/DL (ref 11.5–15.5)
INR PPP: 3.86
LYMPHOCYTES # BLD MANUAL: 1.94 10*3/MM3 (ref 0.6–4.8)
LYMPHOCYTES NFR BLD MANUAL: 13 % (ref 24–44)
LYMPHOCYTES NFR BLD MANUAL: 6 % (ref 0–12)
MCH RBC QN AUTO: 29.6 PG (ref 27–31)
MCHC RBC AUTO-ENTMCNC: 31.4 G/DL (ref 32–36)
MCV RBC AUTO: 94 FL (ref 80–99)
METAMYELOCYTES NFR BLD MANUAL: 2 % (ref 0–0)
MONOCYTES # BLD AUTO: 0.9 10*3/MM3 (ref 0–1)
NEUTROPHILS # BLD AUTO: 11.65 10*3/MM3 (ref 1.5–8.3)
NEUTROPHILS NFR BLD MANUAL: 75 % (ref 41–71)
NEUTS BAND NFR BLD MANUAL: 3 % (ref 0–5)
NRBC SPEC MANUAL: 2 /100 WBC (ref 0–0)
PLAT MORPH BLD: NORMAL
PLATELET # BLD AUTO: 312 10*3/MM3 (ref 150–450)
PMV BLD AUTO: 9.7 FL (ref 6–12)
POTASSIUM BLD-SCNC: 3.3 MMOL/L (ref 3.5–5.5)
POTASSIUM BLD-SCNC: 4.7 MMOL/L (ref 3.5–5.5)
PROTHROMBIN TIME: 43.9 SECONDS (ref 9.6–11.5)
RBC # BLD AUTO: 3.18 10*6/MM3 (ref 3.89–5.14)
RBC MORPH BLD: NORMAL
SCAN SLIDE: NORMAL
SODIUM BLD-SCNC: 139 MMOL/L (ref 132–146)
VANCOMYCIN TROUGH SERPL-MCNC: 13.4 MCG/ML (ref 10–20)
WBC MORPH BLD: NORMAL
WBC NRBC COR # BLD: 14.93 10*3/MM3 (ref 3.5–10.8)

## 2017-12-07 PROCEDURE — 85007 BL SMEAR W/DIFF WBC COUNT: CPT | Performed by: INTERNAL MEDICINE

## 2017-12-07 PROCEDURE — 25010000002 MEROPENEM: Performed by: INTERNAL MEDICINE

## 2017-12-07 PROCEDURE — 25010000002 VANCOMYCIN PER 500 MG

## 2017-12-07 PROCEDURE — 85610 PROTHROMBIN TIME: CPT | Performed by: NURSE PRACTITIONER

## 2017-12-07 PROCEDURE — 85025 COMPLETE CBC W/AUTO DIFF WBC: CPT | Performed by: INTERNAL MEDICINE

## 2017-12-07 PROCEDURE — 80048 BASIC METABOLIC PNL TOTAL CA: CPT

## 2017-12-07 PROCEDURE — 84132 ASSAY OF SERUM POTASSIUM: CPT | Performed by: FAMILY MEDICINE

## 2017-12-07 PROCEDURE — 99233 SBSQ HOSP IP/OBS HIGH 50: CPT | Performed by: FAMILY MEDICINE

## 2017-12-07 PROCEDURE — 80202 ASSAY OF VANCOMYCIN: CPT | Performed by: INTERNAL MEDICINE

## 2017-12-07 RX ORDER — VANCOMYCIN HYDROCHLORIDE 1 G/200ML
1000 INJECTION, SOLUTION INTRAVENOUS ONCE
Status: COMPLETED | OUTPATIENT
Start: 2017-12-07 | End: 2017-12-07

## 2017-12-07 RX ORDER — DEXTROSE AND SODIUM CHLORIDE 5; .9 G/100ML; G/100ML
75 INJECTION, SOLUTION INTRAVENOUS CONTINUOUS
Status: ACTIVE | OUTPATIENT
Start: 2017-12-07 | End: 2017-12-08

## 2017-12-07 RX ORDER — POTASSIUM CHLORIDE 750 MG/1
40 CAPSULE, EXTENDED RELEASE ORAL AS NEEDED
Status: DISCONTINUED | OUTPATIENT
Start: 2017-12-07 | End: 2017-12-14 | Stop reason: HOSPADM

## 2017-12-07 RX ORDER — POTASSIUM CHLORIDE 1.5 G/1.77G
40 POWDER, FOR SOLUTION ORAL AS NEEDED
Status: DISCONTINUED | OUTPATIENT
Start: 2017-12-07 | End: 2017-12-14 | Stop reason: HOSPADM

## 2017-12-07 RX ADMIN — VANCOMYCIN HYDROCHLORIDE 1000 MG: 1 INJECTION, SOLUTION INTRAVENOUS at 09:29

## 2017-12-07 RX ADMIN — Medication 1 CAPSULE: at 10:38

## 2017-12-07 RX ADMIN — MIRTAZAPINE 7.5 MG: 15 TABLET, FILM COATED ORAL at 21:37

## 2017-12-07 RX ADMIN — ALLOPURINOL 150 MG: 300 TABLET ORAL at 09:29

## 2017-12-07 RX ADMIN — LEVOTHYROXINE SODIUM 50 MCG: 50 TABLET ORAL at 09:30

## 2017-12-07 RX ADMIN — POTASSIUM CHLORIDE 40 MEQ: 1.5 POWDER, FOR SOLUTION ORAL at 12:05

## 2017-12-07 RX ADMIN — DOXYCYCLINE 100 MG: 100 INJECTION, POWDER, LYOPHILIZED, FOR SOLUTION INTRAVENOUS at 05:40

## 2017-12-07 RX ADMIN — DIVALPROEX SODIUM 500 MG: 500 TABLET, DELAYED RELEASE ORAL at 09:30

## 2017-12-07 RX ADMIN — CITALOPRAM HYDROBROMIDE 20 MG: 20 TABLET ORAL at 09:30

## 2017-12-07 RX ADMIN — MEROPENEM 1 G: 1 INJECTION, POWDER, FOR SOLUTION INTRAVENOUS at 10:38

## 2017-12-07 RX ADMIN — DIGOXIN 125 MCG: 125 TABLET ORAL at 12:05

## 2017-12-07 RX ADMIN — MEROPENEM 1 G: 1 INJECTION, POWDER, FOR SOLUTION INTRAVENOUS at 21:53

## 2017-12-07 RX ADMIN — DOXYCYCLINE 100 MG: 100 INJECTION, POWDER, LYOPHILIZED, FOR SOLUTION INTRAVENOUS at 18:00

## 2017-12-07 RX ADMIN — POTASSIUM CHLORIDE 40 MEQ: 1.5 POWDER, FOR SOLUTION ORAL at 16:06

## 2017-12-07 RX ADMIN — ATORVASTATIN CALCIUM 10 MG: 10 TABLET, FILM COATED ORAL at 09:30

## 2017-12-07 RX ADMIN — MEROPENEM 1 G: 1 INJECTION, POWDER, FOR SOLUTION INTRAVENOUS at 02:42

## 2017-12-07 NOTE — SIGNIFICANT NOTE
Palliative Team Meeting Attendance  13:00  ANG York RN, EFRA Mcneill, LCSW, [facility]-  MATEUS Nroris RN, DO DAYANNA Lees, MDiv, [facility]  JANELL Acevedo, APRN

## 2017-12-07 NOTE — PROGRESS NOTES
"Pharmacy Consult  -  Warfarin    Katie Crocker is a 89 y.o. female   Height - 160 cm (63\")  Weight - 62.4 kg (137 lb 8 oz)    Consulting Provider: - MOODY Ruelas  Indication: - afib  Goal INR: - 2-3  Home Regimen:   - 2 mg on fridays   - 3 mg all other days    Drug-Drug Interactions with current regimen:  Citalopram 20 mg po daily (increase risk of bleeding)  Mirtazapine 7.5 mg po daily (increase risk of bleeding)  Divalproex 500 mg po daily (increase risk of bleeding)       Warfarin Dosing During Admission:    Date  12/1 12/2 12/3 12/4 12/05 12/06 12/07     INR  aptt too high to measure inr 1.29 1.26 1.93 3.03 3.75 3.86     Dose  ----- 1 mg 1 mg 0.5 mg   hold (hold) (hold)         Labs:    Results from last 7 days   Lab Units 12/07/17  0431 12/06/17  0501 12/05/17  0449 12/04/17  0505 12/03/17  0508 12/02/17  0540 12/02/17  0529 12/01/17  1538 12/01/17  1139   INR  3.86 3.75 3.03 1.93 1.26 --  1.29 --  --    APTT seconds --  --  --  --  --  --  --  89.3* --    HEMOGLOBIN g/dL 9.4* --  10.2* 9.3* 9.3* 10.5* --  --  9.6*   HEMATOCRIT % 29.9* --  31.4* 29.6* 28.6* 32.4* --  --  29.3*   PLATELETS 10*3/mm3 312 --  275 307 270 294 --  --  285     Results from last 7 days   Lab Units 12/07/17  0431 12/05/17  0926 12/04/17  0505 12/03/17  0530 12/02/17  0529 12/01/17  1219   SODIUM mmol/L 139 140 141 141 133 133   POTASSIUM mmol/L 3.3* 3.6 3.4* 3.6 3.8 4.1   CHLORIDE mmol/L 108 110* 115* 111* 101 101   CO2 mmol/L 24.0 20.0 19.0* 19.0* 20.0 22.0   BUN mg/dL 14 20 28* 40* 46* 40*   CREATININE mg/dL 0.90 1.00 0.90 1.60* 2.10* 2.40*   CALCIUM mg/dL 8.5* 8.8 8.6* 9.1 8.0* 8.4*   BILIRUBIN mg/dL --  --  --  0.4 0.4 0.3   ALK PHOS U/L --  --  --  61 49 51   ALT (SGPT) U/L --  --  --  12 6* 6*   AST (SGOT) U/L --  --  --  27 22 26   GLUCOSE mg/dL 87 97 83 74 74 104*       Current dietary intake: 10% to 0% oral intake. Nurse's notes indicate patient is not eating.       Assessment/Plan:   Patient was given Vitamin " K 5mg IV x 1, 12/1 @ 1742.    Warfarin resumed cautiously 12/02 due to supratherapeutic admit INR, and major drug interactions.     Patient's INR is supratherapeutic again today at 3.86. Warfarin dose will be held.   Patient is a complicated case as she is clearly very sensitive to warfarin, not eating, and very acutely ill.   Continue to monitor for s/sx of bleeding, drug-drug interactions, and dietary intake.   Pharmacy will follow closely and dose adjust accordingly.     Martina Renner, PharmD Candidate 2018  12/7/2017  8:08 AM    Carlota LyonsD  12/7/2017  8:19 AM

## 2017-12-07 NOTE — PLAN OF CARE
Problem: Patient Care Overview (Adult)  Goal: Plan of Care Review  Outcome: Ongoing (interventions implemented as appropriate)    12/07/17 8097   Outcome Evaluation   Outcome Summary/Follow up Plan Patient denies pain or discomfort. VS stable. 3L NC. Patient has slept most of the day but is easily aroused and oriented to self and place.        Goal: Adult Individualization and Mutuality  Outcome: Ongoing (interventions implemented as appropriate)    Problem: Fall Risk (Adult)  Goal: Identify Related Risk Factors and Signs and Symptoms  Outcome: Ongoing (interventions implemented as appropriate)  Goal: Absence of Falls  Outcome: Ongoing (interventions implemented as appropriate)

## 2017-12-07 NOTE — PROGRESS NOTES
"Adult Nutrition  Assessment/PES    Patient Name:  Katie Crocker  YOB: 1923  MRN: [id number]  Admit Date:  12/1/2017    Assessment Date:  12/7/2017    Comments:            Reason for Assessment       12/07/17 1153    Reason for Assessment    Reason For Assessment/Visit skin risk;other (comment)   WOC    Time Spent (min) 30    Diagnosis Diagnosis    Cardiac Other (comment)   HX OF HTN, HF, A-FIB & HLP    Endocrine Other (comment)   HX OF LOW THYROID    Gastrointestinal Other (comment)   HX OF GERD    Hematological Other (comment)   HX OF ANEMIA    Infectious Disease Sepsis;UTI    Neurological AMS;Other (comment)   HX OF STROKE WITH HEMIPARESIS    Psychosocial Other (comment)   HX OF DEPRESSION    Pulmonary/Critical Care Pneumonia    Renal CKD    Other diagnosis HX OF GOUT              Nutrition/Diet History       12/07/17 1200    Nutrition/Diet History    Functional Status/Food Prep Mobility --   DAUGHTER REPORTS PT BARELY EATING ANYTHING. SHE PROVIDED SOME FOOD & BEVERAGE POSSIBILITIES & REPORTS IS ACCUSTOMED TO MAGIC CUP SUPPLEMENTS. SHE REPORTS PT MAY ALSO SIP ON BOOST PLUS. PT CURRENTLY SLEEPING.    Factors Affecting Nutritional Intake Factors    Reported/Observed By Other   DAUGHTER            Anthropometrics       12/07/17 1202    Anthropometrics    Height 160 cm (63\")    Weight 62.5 kg (137 lb 12.8 oz)   WEIGHT METHOD NOT SPECIFIED    Ideal Body Weight (IBW)    Ideal Body Weight (IBW), Female 53.12    % Ideal Body Weight 117.92    Body Mass Index (BMI)    BMI (kg/m2) 24.46            Labs/Tests/Procedures/Meds       12/07/17 1203    Labs/Tests/Procedures/Meds    Labs/Tests Review Reviewed;K+    Procedure Review SLP    Swallow eval status Done    Type of SLP Evaluation Other (comment)   FEES    Medication Review Other (comment)   NOTED K+ REPLACEMENT PROTOCOL ORDERED            Physical Findings       12/07/17 1205    Physical Findings/Assessment    Additional Documentation Physical " Appearance (Group)    Physical Appearance    Gastrointestinal other (see comments)   WDL PER NURSING DOCUMENTATION    Skin other (see comments)   WDL PER NURSING DOCUMENTATION & REPORT              Nutrition Prescription Ordered       12/07/17 1206    Nutrition Prescription PO    Current PO Diet Soft Texture    Texture Ground    Fluid Consistency Thin            Evaluation of Received Nutrient/Fluid Intake       12/07/17 1206    PO Evaluation    Number of Meals 3    % PO Intake 8            Problem/Interventions:        Problem 1       12/07/17 1206    Nutrition Diagnoses Problem 1    Problem 1 Inadequate Nutrient Intake    Etiology (related to) Other (comment)   CLINICAL CONDITION    Signs/Symptoms (evidenced by) Report of Mnimal PO Intake;PO Intake    Percent (%) intake recorded 8 %    Over number of meals 3                    Intervention Goal       12/07/17 1207    Intervention Goal    General Nutrition support treatment;Other (comment)   NOTED GOC BEING DISCUSSED WITH FAMILY PER MD NOTES    PO Increase intake            Nutrition Intervention       12/07/17 1207    Nutrition Intervention    RD/Tech Action Advise alternate selection;Advise available snack;Interview for preference;Menu adjusted;Recommend/ordered;Encourage intake;Follow Tx progress;Care plan reviewd    Recommended/Ordered Supplement            Nutrition Prescription       12/07/17 1207    Nutrition Prescription PO    PO Prescription Begin/change supplement    Supplement Magic Cup;Boost Plus    Supplement Frequency 3 times a day    New PO Prescription Ordered? Yes            Education/Evaluation       12/07/17 1208    Monitor/Evaluation    Monitor Per protocol;I&O;PO intake;Supplement intake;Pertinent labs;Weight;Skin status;Symptoms        Electronically signed by:  Janice Pleitez RD  12/07/17 12:08 PM

## 2017-12-07 NOTE — PROGRESS NOTES
[facility]  PROGRESS NOTE    Patient Name: Katie Crocker  : 3/1/1923  MRN: [id number]    Date of Admission: 2017  Length of Stay: 6  Primary Care Physician: Jose Armando Sharif MD    Subjective   Subjective     CC:  Sepsis (present on admission)    Subjective:  Daughter at bedside. Pt resting but easily awakens. Some cough, no fever, daughter plans on making decision about hospice in the next 1-2 days. Leaning toward hospice if no improvement.    Review of Systems    Otherwise ROS is negative except as mentioned above.    Objective   Objective     Vital Signs:   Temp:  [97.9 °F (36.6 °C)-99.4 °F (37.4 °C)] 97.9 °F (36.6 °C)  Heart Rate:  [84-96] 84  Resp:  [16-20] 20  BP: (112-155)/(49-97) 155/97        Physical Exam:  Constitutional: opens eyes responsive , resting comfortably, no distress  Eyes: PERRLA, sclerae anicteric, no conjunctival injection  HENT: NCAT, mucous membranes moist  Neck: Supple, no thyromegaly, no lymphadenopathy, trachea midline  Respiratory: decreased bs bilaterally, nonlabored respirations   Cardiovascular: RRR, no murmurs, rubs, or gallops, palpable pedal pulses bilaterally  Gastrointestinal: Positive bowel sounds, soft, nontender, nondistended  Musculoskeletal: No bilateral ankle edema, no clubbing or cyanosis to extremities  Psychiatric: sleepy, but awakens  Skin: No rashes    Results Reviewed:  I have personally reviewed current lab, radiology, and data and agree.      Results from last 7 days  Lab Units 17  0431 17  0501 17  0449 17  0505   WBC 10*3/mm3 14.93*  --  18.41* 16.23*   HEMOGLOBIN g/dL 9.4*  --  10.2* 9.3*   HEMATOCRIT % 29.9*  --  31.4* 29.6*   PLATELETS 10*3/mm3 312  --  275 307   INR  3.86 3.75 3.03 1.93       Results from last 7 days  Lab Units 17  0431 17  0926 17  0505 17  0530 17  0529 17  1219   SODIUM mmol/L 139 140 141 141 133 133   POTASSIUM mmol/L  3.3* 3.6 3.4* 3.6 3.8 4.1   CHLORIDE mmol/L 108 110* 115* 111* 101 101   CO2 mmol/L 24.0 20.0 19.0* 19.0* 20.0 22.0   BUN mg/dL 14 20 28* 40* 46* 40*   CREATININE mg/dL 0.90 1.00 0.90 1.60* 2.10* 2.40*   GLUCOSE mg/dL 87 97 83 74 74 104*   CALCIUM mg/dL 8.5* 8.8 8.6* 9.1 8.0* 8.4*   ALT (SGPT) U/L  --   --   --  12 6* 6*   AST (SGOT) U/L  --   --   --  27 22 26     No results found for: BNP  No results found for: PHART    Microbiology Results Abnormal     Procedure Component Value - Date/Time    Urine Culture - Urine, Urine, Clean Catch [[id number]]  (Abnormal) Collected:  12/05/17 2009    Lab Status:  Final result Specimen:  Urine from Urine, Clean Catch Updated:  12/07/17 1146     Urine Culture --      20,000-30,000 CFU/mL Yeast isolated (A)    Blood Culture - Blood, [[id number]]  (Normal) Collected:  12/05/17 1420    Lab Status:  Preliminary result Specimen:  Blood from Arm, Left Updated:  12/06/17 1501     Blood Culture No growth at 24 hours    Blood Culture - Blood, [[id number]]  (Normal) Collected:  12/05/17 1415    Lab Status:  Preliminary result Specimen:  Blood from Arm, Left Updated:  12/06/17 1501     Blood Culture No growth at 24 hours    Blood Culture - Blood, [[id number]]  (Normal) Collected:  12/01/17 1148    Lab Status:  Final result Specimen:  Blood from Arm, Left Updated:  12/06/17 1231     Blood Culture No growth at 5 days    Blood Culture - Blood, [[id number]]  (Normal) Collected:  12/01/17 1140    Lab Status:  Final result Specimen:  Blood from Arm, Right Updated:  12/06/17 1231     Blood Culture No growth at 5 days    Urine Culture - Urine, Urine, Clean Catch [[id number]]  (Abnormal)  (Susceptibility) Collected:  12/01/17 1137    Lab Status:  Final result Specimen:  Urine from Urine, Catheter Updated:  12/03/17 1143     Urine Culture --      >100,000 CFU/mL Escherichia coli (A)    Susceptibility      Escherichia coli     MARCUS     Ampicillin >16 ug/ml Resistant     Ampicillin + Sulbactam >16/8 ug/ml  Resistant     Aztreonam <=8 ug/ml Susceptible     Cefepime <=8 ug/ml Susceptible     Cefotaxime <=2 ug/ml Susceptible     Ceftriaxone <=8 ug/ml Susceptible     Cefuroxime sodium <=4 ug/ml Susceptible     Cephalothin 16 ug/ml Intermediate     Ertapenem <=1 ug/ml Susceptible     Gentamicin <=4 ug/ml Susceptible     Levofloxacin <=2 ug/ml Susceptible     Meropenem <=1 ug/ml Susceptible     Nitrofurantoin <=32 ug/ml Susceptible     Piperacillin + Tazobactam <=16 ug/ml Susceptible     Tetracycline 8 ug/ml Intermediate     Tobramycin <=4 ug/ml Susceptible     Trimethoprim + Sulfamethoxazole >2/38 ug/ml Resistant                    Eosinophil Smear - Urine, Urine, Clean Catch [[id number]]  (Normal) Collected:  12/01/17 1702    Lab Status:  Final result Specimen:  Urine from Urine, Clean Catch Updated:  12/01/17 2137     Eosinophil Smear 0 % EOS/100 Cells     Narrative:       No eosinophil seen    Influenza A & B, RT PCR - Swab, Nasopharynx [[id number]]  (Normal) Collected:  12/01/17 1703    Lab Status:  Final result Specimen:  Swab from Nasopharynx Updated:  12/01/17 1820     Influenza A PCR Not Detected     Influenza B PCR Not Detected          Imaging Results (last 24 hours)     ** No results found for the last 24 hours. **        Results for orders placed during the hospital encounter of 12/01/17   Adult Transthoracic Echo Complete W/ Cont if Necessary Per Protocol    Narrative · Left ventricular systolic function is normal. Estimated EF = 55%.  · Mild aortic valve regurgitation is present.  · Mild-to-moderate mitral valve regurgitation is present  · Moderate to severe tricuspid valve regurgitation is present.  · Calculated right ventricular systolic pressure from tricuspid   regurgitation is 55 mmHg.          I have reviewed the medications.    Assessment/Plan   Assessment / Plan     Hospital Problem List     * (Principal)Sepsis    HCAP (healthcare-associated pneumonia)    Urinary tract infection    Hypertension     Hyperlipidemia    GERD (gastroesophageal reflux disease)    CHF (congestive heart failure)    Depression    Gout    Hypothyroidism    Hemiparesis affecting right side as late effect of stroke    Atrial fibrillation    Supratherapeutic INR    Acute on chronic kidney failure    Anemia             Brief Hospital Course to date:  Katie Crocker is a 89 y.o. female with past medical history significant for hypertension, hyperlipidemia, hypothyroidism, atrial fibrillation, history of CVA, chronic kidney disease.  The patient was admitted for altered mental status and she was found to have pneumonia and UTI at the emergency room.      Assessment & Plan:  * Severe Sepsis POA  - ID following - high procalcitonin on admission, clincally not improving. I have had a discussion with daughter and she is aware of the severeity of her mothers condition.    *HCAP with CXR evidence of consolidation, on antibiotics and currently afebrile.cefepime/doxy/flagyl, ID following and managing abx , ABG reveiewed,  CXR worsening.and elevated WBC-  ABX increased to VAnc/Merrem/Doxy per ID today   * yeast in urine, will dw ID.     * E Coli UTI (POA)- Abx per ID     * Altered mental status secondary to sepsis.  stable    * Dysphagia.  Patient failed swallow test twice but then took Fees and passed. ok'd diet per ST .     * Hypertension.    *Leukocytosis- trending down now with change in abx. No fever. reculture blood negative, check UA negative cx pos for yeast.     *Hypokalemia- replace    * Congestive heart failure - EF 55%, mod/severe tricuspid regurg, elevated RVSP,  will lightly give fluids. Pt has minimal oral intake..    * Acute on Chronic Kidney Disease - improving - back to baseline now    * Supratherapeutic INR on admission -  back on warfarin, up to 3.86 today, pharmacy dosing.     * Plan to continue medical management another day or two then if no improvement to hospice or back to [facility] with palliative. Per family     DVT  Prophylaxis:  Anticoagulated with warfarin    CODE STATUS: Conditional Code, DNR     Disposition: TBD. Overall poor prognosis     Ita Sevilla,   12/07/17  2:00 PM

## 2017-12-07 NOTE — PLAN OF CARE
Problem: Patient Care Overview (Adult)  Goal: Plan of Care Review  Outcome: Ongoing (interventions implemented as appropriate)    12/07/17 0321   Coping/Psychosocial Response Interventions   Plan Of Care Reviewed With patient   Patient Care Overview   Progress improving   Outcome Evaluation   Outcome Summary/Follow up Plan Patient moved to specilty bed. Slept well tonight         Problem: Fall Risk (Adult)  Goal: Identify Related Risk Factors and Signs and Symptoms  Outcome: Ongoing (interventions implemented as appropriate)    12/07/17 0321   Fall Risk   Fall Risk: Related Risk Factors age-related changes;fear of falling;gait/mobility problems;history of falls   Fall Risk: Signs and Symptoms presence of risk factors

## 2017-12-07 NOTE — PROGRESS NOTES
[facility] Progress Note    Admission Date: 12/1/2017    Katie Crocker  3/1/1923  [id number]    Date: 12/7/2017    Meds:    Anti-Infectives     Ordered     Dose/Rate Route Frequency Start Stop    12/07/17 0710  vancomycin (VANCOCIN) in iso-osmotic dextrose IVPB 1 g (premix) 200 mL     Ordering Provider:  Raine Steen RPH    1,000 mg  over 60 Minutes Intravenous Once 12/07/17 0900 12/07/17 1029    12/05/17 1942  vancomycin (dosing per levels)     Ordering Provider:  Alvaro Antonio MD     Does not apply Daily 12/06/17 0900 12/13/17 0859    12/05/17 1930  meropenem (MERREM) 1 g/100 mL 0.9% NS VTB (mbp)     Ordering Provider:  Alvaro Antonio MD    1 g  over 3 Hours Intravenous Every 8 Hours 12/06/17 0200 12/11/17 0159    12/05/17 1930  meropenem (MERREM) 1 g/100 mL 0.9% NS VTB (mbp)     Ordering Provider:  Alvaro Antonio MD    1 g  over 30 Minutes Intravenous Once 12/05/17 2015 12/05/17 2046    12/05/17 1942  vancomycin IVPB 1250 mg in 250 mL NS (premix)     Ordering Provider:  Alvaro Antonio MD    20 mg/kg × 62.1 kg  over 90 Minutes Intravenous Once 12/05/17 2015 12/06/17 0000    12/05/17 1930  Pharmacy to dose vancomycin     Ordering Provider:  Alvaro Antonio MD     Does not apply Continuous PRN 12/05/17 1928 12/10/17 1927    12/01/17 1651  doxycycline (VIBRAMYCIN) 100 mg in sodium chloride 0.9 % 250 mL IVPB     Raine Steen [facility] reviewed the order on 12/06/17 0720.   Ordering Provider:  Alvaro Antonio MD    100 mg  over 60 Minutes Intravenous Every 12 Hours 12/01/17 1800 12/15/17 2359    12/01/17 1553  meropenem (MERREM) 1 g/100 mL 0.9% NS VTB (mbp)     Ordering Provider:  MOODY Ruelas    1 g  over 30 Minutes Intravenous Once 12/01/17 1630 12/01/17 1736    12/01/17 1142  vancomycin 1250 mg/250 mL 0.9% NS IVPB ([facility])     Ordering Provider:  Jacky Condon MD    20 mg/kg × 59.9 kg  over 90 Minutes Intravenous Once 12/01/17 1144 12/01/17 1444    12/01/17 1142  piperacillin-tazobactam (ZOSYN) 4.5 g in  iso-osmotic dextrose 100 mL IVPB (premix)     Ordering Provider:  Jacky Condon MD    4.5 g Intravenous Once 17 1143 17 1233          CC:  Sepsis, pna, UTI    SUBJECTIVE:  Patient says she is cold again today.  Has at least 5 blankets on.  No recorded fevers.  She asks me if she is going to die.    PE:   Vital Signs  Temp (24hrs), Av.6 °F (37 °C), Min:97.9 °F (36.6 °C), Max:99.4 °F (37.4 °C)    Temp  Min: 97.9 °F (36.6 °C)  Max: 99.4 °F (37.4 °C)  BP  Min: 112/49  Max: 155/97  Pulse  Min: 84  Max: 96  Resp  Min: 16  Max: 20  SpO2  Min: 90 %  Max: 92 %    GENERAL: Awake, answers most questions appropriately.  A bit confused.  HEART: RRR with ectopy; No murmur, rubs, gallops.   LUNGS: Diminished at lung bilaterally, rhonchi at right mid to lower lung fields.  Increased work of breathing.  ABDOMEN: Soft, nontender, nondistended. Positive bowel sounds. No rebound or guarding.  EXT:  No cyanosis, clubbing or edema.  SKIN: Warm and dry without cutaneous eruptions on Inspection/palpation.     Laboratory Data      Results from last 7 days  Lab Units 17  0431 17  0449 17  0505   WBC 10*3/mm3 14.93* 18.41* 16.23*   HEMOGLOBIN g/dL 9.4* 10.2* 9.3*   HEMATOCRIT % 29.9* 31.4* 29.6*   PLATELETS 10*3/mm3 312 275 307       Results from last 7 days  Lab Units 17  0431   SODIUM mmol/L 139   POTASSIUM mmol/L 3.3*   CHLORIDE mmol/L 108   CO2 mmol/L 24.0   BUN mg/dL 14   CREATININE mg/dL 0.90   GLUCOSE mg/dL 87   CALCIUM mg/dL 8.5*       Results from last 7 days  Lab Units 17  0530   ALK PHOS U/L 61   BILIRUBIN mg/dL 0.4   ALT (SGPT) U/L 12   AST (SGOT) U/L 27       Results from last 7 days  Lab Units 17  0540   SED RATE mm/hr 88*       Results from last 7 days  Lab Units 17  0529   CRP mg/dL 40.55*       Results from last 7 days  Lab Units 17  0540   LACTATE mmol/L 1.5           Results from last 7 days  Lab Units 17  0431 17  0501   VANCOMYCIN TR mcg/mL  13.40 36.80*     Level is not trough - more like peak      Estimated Creatinine Clearance: 37.7 mL/min (by C-G formula based on Cr of 0.9).    Pro calcitonin 1.37, down from initial of 12    Brief Urine Lab Results  (Last result in the past 365 days)      Color   Clarity   Blood   Leuk Est   Nitrite   Protein   CREAT   Urine HCG        12/05/17 2009 Yellow Clear Negative Small (1+)(A) Negative 30 mg/dL (1+)(A)                 Microbiology:    Urine Culture - Urine, Urine, Clean Catch [[id number]] (Abnormal) Collected: 12/05/17 2009        Lab Status: Final result Specimen: Urine from Urine, Clean Catch Updated: 12/07/17 1146        Urine Culture --         20,000-30,000 CFU/mL Yeast isolated (A)       Blood Culture - Blood, [[id number]] (Normal) Collected: 12/05/17 1420       Lab Status: Preliminary result Specimen: Blood from Arm, Left Updated: 12/07/17 1502        Blood Culture No growth at 2 days       Blood Culture - Blood, [[id number]] (Normal) Collected: 12/05/17 1415       Lab Status: Preliminary result Specimen: Blood from Arm, Left Updated: 12/07/17 1502        Blood Culture No growth at 2 days       Influenza A & B, RT PCR - Swab, Nasopharynx [[id number]] (Normal) Collected: 12/01/17 1703       Lab Status: Final result Specimen: Swab from Nasopharynx Updated: 12/01/17 1820        Influenza A PCR Not Detected        Influenza B PCR Not Detected       Eosinophil Smear - Urine, Urine, Clean Catch [[id number]] (Normal) Collected: 12/01/17 1702       Lab Status: Final result Specimen: Urine from Urine, Clean Catch Updated: 12/01/17 2137        Eosinophil Smear 0 % EOS/100 Cells        Narrative:         No eosinophil seen       Blood Culture - Blood, [[id number]] (Normal) Collected: 12/01/17 1148       Lab Status: Final result Specimen: Blood from Arm, Left Updated: 12/06/17 1231        Blood Culture No growth at 5 days       Blood Culture - Blood, [[id number]] (Normal) Collected: 12/01/17 1140       Lab Status: Final  result Specimen: Blood from Arm, Right Updated: 12/06/17 1231        Blood Culture No growth at 5 days       Urine Culture - Urine, Urine, Clean Catch [[id number]] (Abnormal)  Collected: 12/01/17 1137       Lab Status: Final result Specimen: Urine from Urine, Catheter Updated: 12/03/17 1143        Urine Culture --         >100,000 CFU/mL Escherichia coli (A)             Radiology:  Imaging Results (last 72 hours)     Procedure Component Value Units Date/Time    XR Chest 1 View [[id number]] Collected:  12/04/17 1328     Updated:  12/04/17 1359    Narrative:       EXAMINATION: XR CHEST 1 VW- 12/04/2017     INDICATION: R50.9-Fever, unspecified; J18.9-Pneumonia, unspecified  organism; E87.70-Fluid overload, unspecified; I50.9-Heart failure,  unspecified; N39.0-Urinary tract infection, site not specified;  R09.02-Hypoxemia; I48.2-Chronic atrial fibrillation      COMPARISON: 12/01/2017     FINDINGS:   1. Bilateral diffuse ill-defined airspace opacities are seen diffusely  throughout the mid and lower lung zones with coalescent consolidation at  the bases and a left base effusion.  2. There is cardiomegaly.           Impression:       Compared to previous studies of 3 days ago, the edema and  the ill-defined airspace opacities seen most densely in the mid and  lower lung zones bilaterally with high-grade consolidation at the left  base appear to be worse and have progressed somewhat.     D:  12/04/2017  E:  12/04/2017     This report was finalized on 12/4/2017 1:57 PM by Dr. Blue Gallo MD.           I personally reviewed the film.      Discussion:   89 yo female was admitted 12/1 with sepsis, pneumonia, and UTI.  She has a h/o recurrent pneumonia and MDR UTIs.     Problem List:   --Severe sepsis POA with ARF, leukocytosis, fever, encephalopathy, and elevated procalcitonin.  WBC with some improvement after broadening of abx.  --Acute complicated recurrent UTI, cx with E. coli.  [H/o MDR UTIs; including MDR E. Coli and VRE  at [facility] in 2015 - I reviewed outside records.]  --Basilar pneumonia - concern for aspiration - CXR worsening  --ARF - resolved  --Leukocytosis/neutrophilia - again worse   --Fever - improved  --Acute encephalopathy, metabolic.  --H/o CVA  --h/o chronic coumadin       PLAN/RECOMMENDATIONS:   Continue vancomycin, meropenem, and doxycycline - abx broadened 12/5/17  WBC a bit improved today; following.  Palliative following  Prognosis guarded.  Discussed with pt's daughters at bedside earlier this week.  They understand risk of complications with aggressive antibiotics, including renal failure, but wanted to give it a chance.  I discussed with them that this may be a terminal event and they understand.   Patient is DNR.    Alvaro Antonio MD  12/7/2017  3:27 PM

## 2017-12-07 NOTE — PROGRESS NOTES
Pharmacy Consult-Vancomycin Dosing  Katie Crocker is a  89 y.o. female receiving vancomycin therapy.     Indication: sepsis  Consulting Provider:  Dr. RITA Antonio (I.D.)  Goal Trough: 15~20    Current Antimicrobial Therapy  Doxycycline 100 mg IV q12h (12/01-12/15)  Merrem 1g IV q8h (12/06-12/11)    S/p: cefepime + flagyl (x 4 days)     Anti-Infectives       Ordered     Dose/Rate Route Frequency Start Stop      12/07/17 0710  vancomycin (VANCOCIN) in iso-osmotic dextrose IVPB 1 g (premix) 200 mL     Ordering Provider:  Raine Steen RPH    1,000 mg  over 60 Minutes Intravenous Once 12/07/17 0900      12/05/17 1942  vancomycin (dosing per levels)     Ordering Provider:  Alvaro Antonio MD     Does not apply Daily 12/06/17 0900 12/13/17 0859    12/05/17 1930  meropenem (MERREM) 1 g/100 mL 0.9% NS VTB (mbp)     Ordering Provider:  Alvaro Antonio MD    1 g  over 3 Hours Intravenous Every 8 Hours 12/06/17 0200 12/11/17 0159    12/05/17 1930  meropenem (MERREM) 1 g/100 mL 0.9% NS VTB (mbp)     Ordering Provider:  Alvaro Antonio MD    1 g  over 30 Minutes Intravenous Once 12/05/17 2015 12/05/17 2046    12/05/17 1942  vancomycin IVPB 1250 mg in 250 mL NS (premix)     Ordering Provider:  Alvaro Antonio MD    20 mg/kg × 62.1 kg  over 90 Minutes Intravenous Once 12/05/17 2015 12/06/17 0000    12/05/17 1930  Pharmacy to dose vancomycin     Ordering Provider:  Alvaro Antonio MD     Does not apply Continuous PRN 12/05/17 1928 12/10/17 1927    12/01/17 1651  doxycycline (VIBRAMYCIN) 100 mg in sodium chloride 0.9 % 250 mL IVPB     Raine Steen RPH reviewed the order on 12/06/17 0720.   Ordering Provider:  Alvaro Antonio MD    100 mg  over 60 Minutes Intravenous Every 12 Hours 12/01/17 1800 12/15/17 2359    12/01/17 1553  meropenem (MERREM) 1 g/100 mL 0.9% NS VTB (mbp)     Ordering Provider:  MOODY Ruelas    1 g  over 30 Minutes Intravenous Once 12/01/17 1630 12/01/17 1736    12/01/17 1142  vancomycin 1250 mg/250 mL  "0.9% NS IVPB ([facility])     Ordering Provider:  Jacky Condon MD    20 mg/kg × 59.9 kg  over 90 Minutes Intravenous Once 12/01/17 1144 12/01/17 1444    12/01/17 1142  piperacillin-tazobactam (ZOSYN) 4.5 g in iso-osmotic dextrose 100 mL IVPB (premix)     Ordering Provider:  Jacky Condon MD    4.5 g Intravenous Once 12/01/17 1143 12/01/17 1233            Allergies  Allergies as of 12/01/2017 - Miguel as Reviewed 12/01/2017   Allergen Reaction Noted   • Lithium  12/01/2017       Labs    Results from last 7 days   Lab Units 12/07/17  0431 12/05/17  0926 12/04/17  0505   BUN mg/dL 14 20 28*   CREATININE mg/dL 0.90 1.00 0.90       Results from last 7 days   Lab Units 12/07/17  0431 12/05/17  0449 12/04/17  0505   WBC 10*3/mm3 14.93* 18.41* 16.23*       Evaluation of Dosing     Last Dose Received in the ED/Outside Facility: Pt received 1250mg x1 in ER on 12/1/17.    Ht - 160 cm (63\")  Wt - 62.4 kg (137 lb 8 oz)    Estimated Creatinine Clearance: 37.7 mL/min (by C-G formula based on Cr of 0.9).    Intake & Output (last 3 days)         12/04 0701 - 12/05 0700 12/05 0701 - 12/06 0700 12/06 0701 - 12/07 0700 12/07 0701 - 12/08 0700      P.O.  720 118     I.V. (mL/kg)        IV Piggyback        Total Intake(mL/kg)  720 (11.6) 118 (1.9)     Urine (mL/kg/hr)  100 (0.1)      Stool  0 (0)      Total Output   100        Net   +620 +118              Unmeasured Urine Occurrence 1 x 5 x 6 x     Unmeasured Stool Occurrence 1 x 3 x 1 x             Microbiology and Radiology  Microbiology Results (last 10 days)       Procedure Component Value - Date/Time      Urine Culture - Urine, Urine, Clean Catch [[id number]]  (Normal) Collected:  12/05/17 2009    Lab Status:  Preliminary result Specimen:  Urine from Urine, Clean Catch Updated:  12/06/17 0816     Urine Culture No growth    Blood Culture - Blood, [[id number]]  (Normal) Collected:  12/05/17 1420    Lab Status:  Preliminary result Specimen:  Blood from Arm, Left Updated:  12/06/17 " 1501     Blood Culture No growth at 24 hours    Blood Culture - Blood, [[id number]]  (Normal) Collected:  12/05/17 1415    Lab Status:  Preliminary result Specimen:  Blood from Arm, Left Updated:  12/06/17 1501     Blood Culture No growth at 24 hours    Influenza A & B, RT PCR - Swab, Nasopharynx [[id number]]  (Normal) Collected:  12/01/17 1703    Lab Status:  Final result Specimen:  Swab from Nasopharynx Updated:  12/01/17 1820     Influenza A PCR Not Detected     Influenza B PCR Not Detected    Eosinophil Smear - Urine, Urine, Clean Catch [[id number]]  (Normal) Collected:  12/01/17 1702    Lab Status:  Final result Specimen:  Urine from Urine, Clean Catch Updated:  12/01/17 2137     Eosinophil Smear 0 % EOS/100 Cells     Narrative:       No eosinophil seen    Blood Culture - Blood, [[id number]]  (Normal) Collected:  12/01/17 1148    Lab Status:  Final result Specimen:  Blood from Arm, Left Updated:  12/06/17 1231     Blood Culture No growth at 5 days    Blood Culture - Blood, [[id number]]  (Normal) Collected:  12/01/17 1140    Lab Status:  Final result Specimen:  Blood from Arm, Right Updated:  12/06/17 1231     Blood Culture No growth at 5 days    Urine Culture - Urine, Urine, Clean Catch [[id number]]  (Abnormal)  (Susceptibility) Collected:  12/01/17 1137    Lab Status:  Final result Specimen:  Urine from Urine, Catheter Updated:  12/03/17 1143     Urine Culture --      >100,000 CFU/mL Escherichia coli (A)    Susceptibility        Escherichia coli     MARCUS     Ampicillin >16 ug/ml Resistant     Ampicillin + Sulbactam >16/8 ug/ml Resistant     Aztreonam <=8 ug/ml Susceptible     Cefepime <=8 ug/ml Susceptible     Cefotaxime <=2 ug/ml Susceptible     Ceftriaxone <=8 ug/ml Susceptible     Cefuroxime sodium <=4 ug/ml Susceptible     Cephalothin 16 ug/ml Intermediate     Ertapenem <=1 ug/ml Susceptible     Gentamicin <=4 ug/ml Susceptible     Levofloxacin <=2 ug/ml Susceptible     Meropenem <=1 ug/ml Susceptible      Nitrofurantoin <=32 ug/ml Susceptible     Piperacillin + Tazobactam <=16 ug/ml Susceptible     Tetracycline 8 ug/ml Intermediate     Tobramycin <=4 ug/ml Susceptible     Trimethoprim + Sulfamethoxazole >2/38 ug/ml Resistant                            Evaluation of Levels   Lab Results   Component Value Date    BRETT 13.40 12/07/2017    ~ 30 hr level     Assessment/Plan:  Due to patient's age, renal function, and clinical status vancomycin is currently being dosed per level.   Vancomycin random is 13.0 mcg/mL at 4:30 this morning (12/07). This reflects an ~30 hour level since last dose.   Patient will receive vancomycin 1000 mg IV this morning.   Vancomycin random and BMP scheduled tomorrow am.   Monitor patient's renal function, via UOP and SCr, daily.   Patient is currently on day 2 of 2 day trail of antibiotics to see if condition improves. We anticipate goals of care will be more clear tomorrow (12/08).   Pharmacy will follow closely and dose adjust as needed.     Martina Renner, PharmD Candidate 2018  12/7/2017  7:30 AM    Raine Steen PharmD  12/7/2017  8:17 AM

## 2017-12-08 ENCOUNTER — APPOINTMENT (OUTPATIENT)
Dept: GENERAL RADIOLOGY | Facility: HOSPITAL | Age: 82
End: 2017-12-08

## 2017-12-08 LAB
ANION GAP SERPL CALCULATED.3IONS-SCNC: 7 MMOL/L (ref 3–11)
BASOPHILS # BLD AUTO: 0.1 10*3/MM3 (ref 0–0.2)
BASOPHILS NFR BLD AUTO: 0.7 % (ref 0–1)
BUN BLD-MCNC: 11 MG/DL (ref 9–23)
BUN/CREAT SERPL: 13.8 (ref 7–25)
CALCIUM SPEC-SCNC: 8.6 MG/DL (ref 8.7–10.4)
CHLORIDE SERPL-SCNC: 110 MMOL/L (ref 99–109)
CO2 SERPL-SCNC: 23 MMOL/L (ref 20–31)
CREAT BLD-MCNC: 0.8 MG/DL (ref 0.6–1.3)
DEPRECATED RDW RBC AUTO: 57.9 FL (ref 37–54)
EOSINOPHIL # BLD AUTO: 0.13 10*3/MM3 (ref 0–0.3)
EOSINOPHIL NFR BLD AUTO: 0.9 % (ref 0–3)
ERYTHROCYTE [DISTWIDTH] IN BLOOD BY AUTOMATED COUNT: 17 % (ref 11.3–14.5)
GFR SERPL CREATININE-BSD FRML MDRD: 67 ML/MIN/1.73
GLUCOSE BLD-MCNC: 84 MG/DL (ref 70–100)
HCT VFR BLD AUTO: 29.8 % (ref 34.5–44)
HGB BLD-MCNC: 9.4 G/DL (ref 11.5–15.5)
IMM GRANULOCYTES # BLD: 0.87 10*3/MM3 (ref 0–0.03)
IMM GRANULOCYTES NFR BLD: 5.9 % (ref 0–0.6)
INR PPP: 3.94
LYMPHOCYTES # BLD AUTO: 2.9 10*3/MM3 (ref 0.6–4.8)
LYMPHOCYTES NFR BLD AUTO: 19.5 % (ref 24–44)
MCH RBC QN AUTO: 29.7 PG (ref 27–31)
MCHC RBC AUTO-ENTMCNC: 31.5 G/DL (ref 32–36)
MCV RBC AUTO: 94.3 FL (ref 80–99)
MONOCYTES # BLD AUTO: 1.5 10*3/MM3 (ref 0–1)
MONOCYTES NFR BLD AUTO: 10.1 % (ref 0–12)
NEUTROPHILS # BLD AUTO: 9.34 10*3/MM3 (ref 1.5–8.3)
NEUTROPHILS NFR BLD AUTO: 62.9 % (ref 41–71)
PLAT MORPH BLD: NORMAL
PLATELET # BLD AUTO: 336 10*3/MM3 (ref 150–450)
PMV BLD AUTO: 9.5 FL (ref 6–12)
POTASSIUM BLD-SCNC: 3.9 MMOL/L (ref 3.5–5.5)
PROTHROMBIN TIME: 44.8 SECONDS (ref 9.6–11.5)
RBC # BLD AUTO: 3.16 10*6/MM3 (ref 3.89–5.14)
RBC MORPH BLD: NORMAL
SODIUM BLD-SCNC: 140 MMOL/L (ref 132–146)
VANCOMYCIN TROUGH SERPL-MCNC: 27.6 MCG/ML (ref 10–20)
WBC MORPH BLD: NORMAL
WBC NRBC COR # BLD: 14.84 10*3/MM3 (ref 3.5–10.8)

## 2017-12-08 PROCEDURE — 85025 COMPLETE CBC W/AUTO DIFF WBC: CPT | Performed by: FAMILY MEDICINE

## 2017-12-08 PROCEDURE — 80202 ASSAY OF VANCOMYCIN: CPT | Performed by: INTERNAL MEDICINE

## 2017-12-08 PROCEDURE — 99232 SBSQ HOSP IP/OBS MODERATE 35: CPT | Performed by: NURSE PRACTITIONER

## 2017-12-08 PROCEDURE — 85610 PROTHROMBIN TIME: CPT | Performed by: NURSE PRACTITIONER

## 2017-12-08 PROCEDURE — 85007 BL SMEAR W/DIFF WBC COUNT: CPT | Performed by: FAMILY MEDICINE

## 2017-12-08 PROCEDURE — 25010000002 MEROPENEM: Performed by: INTERNAL MEDICINE

## 2017-12-08 PROCEDURE — 80048 BASIC METABOLIC PNL TOTAL CA: CPT | Performed by: FAMILY MEDICINE

## 2017-12-08 PROCEDURE — 71010 HC CHEST PA OR AP: CPT

## 2017-12-08 PROCEDURE — 25810000003 DEXTROSE-NACL PER 500 ML: Performed by: FAMILY MEDICINE

## 2017-12-08 RX ORDER — NYSTATIN 100000 [USP'U]/G
POWDER TOPICAL EVERY 12 HOURS SCHEDULED
Status: DISCONTINUED | OUTPATIENT
Start: 2017-12-08 | End: 2017-12-14 | Stop reason: HOSPADM

## 2017-12-08 RX ADMIN — Medication 1 CAPSULE: at 09:07

## 2017-12-08 RX ADMIN — DEXTROSE AND SODIUM CHLORIDE 75 ML/HR: 5; 900 INJECTION, SOLUTION INTRAVENOUS at 05:09

## 2017-12-08 RX ADMIN — DOXYCYCLINE 100 MG: 100 INJECTION, POWDER, LYOPHILIZED, FOR SOLUTION INTRAVENOUS at 05:08

## 2017-12-08 RX ADMIN — CITALOPRAM HYDROBROMIDE 20 MG: 20 TABLET ORAL at 09:07

## 2017-12-08 RX ADMIN — LEVOTHYROXINE SODIUM 50 MCG: 50 TABLET ORAL at 09:07

## 2017-12-08 RX ADMIN — DIVALPROEX SODIUM 500 MG: 500 TABLET, DELAYED RELEASE ORAL at 09:07

## 2017-12-08 RX ADMIN — MEROPENEM 1 G: 1 INJECTION, POWDER, FOR SOLUTION INTRAVENOUS at 17:18

## 2017-12-08 RX ADMIN — DIGOXIN 125 MCG: 125 TABLET ORAL at 11:33

## 2017-12-08 RX ADMIN — ALLOPURINOL 150 MG: 300 TABLET ORAL at 09:07

## 2017-12-08 RX ADMIN — ATORVASTATIN CALCIUM 10 MG: 10 TABLET, FILM COATED ORAL at 09:07

## 2017-12-08 RX ADMIN — MEROPENEM 1 G: 1 INJECTION, POWDER, FOR SOLUTION INTRAVENOUS at 01:30

## 2017-12-08 RX ADMIN — MIRTAZAPINE 7.5 MG: 15 TABLET, FILM COATED ORAL at 19:56

## 2017-12-08 RX ADMIN — MEROPENEM 1 G: 1 INJECTION, POWDER, FOR SOLUTION INTRAVENOUS at 11:33

## 2017-12-08 NOTE — PROGRESS NOTES
Continued Stay Note  [facility]     Patient Name: Katie Crocker  MRN: [id number]  Today's Date: 12/8/2017    Admit Date: 12/1/2017          Discharge Plan       12/08/17 1627    Case Management/Social Work Plan    Plan ONGOING    Patient/Family In Agreement With Plan yes    Additional Comments Spoke with Elida at [facility].  She visited with pt and daughter at bedside today.  Dispo undetermined at present.  Confirmed with Elida that pt can return to Medicaid bed with Hospice services, if desired, and there would be no out-of-pocket expense, however, [facility] cannot accept pt with these new services over the weekend.  CM will f/u on Monday to facilitate transition/return to facility.               Discharge Codes     None        Expected Discharge Date and Time     Expected Discharge Date Expected Discharge Time    Dec 6, 2017             Hermelinda Hall

## 2017-12-08 NOTE — PROGRESS NOTES
"Pharmacy Consult  -  Warfarin    Katie Crocker is a 89 y.o. female   Height - 160 cm (63\")  Weight - 59.2 kg (130 lb 8 oz)    Consulting Provider: - MOODY Ruelas  Indication: - afib  Goal INR: - 2-3  Home Regimen:   - 2 mg on fridays   - 3 mg all other days    Drug-Drug Interactions with current regimen:  Citalopram 20 mg po daily (increase risk of bleeding)  Mirtazapine 7.5 mg po daily (increase risk of bleeding)  Divalproex 500 mg po daily (increase risk of bleeding)       Warfarin Dosing During Admission:    Date  12/1 12/2 12/3 12/4 12/05 12/06 12/07 12/08    INR  aptt too high to measure inr 1.29 1.26 1.93 3.03 3.75 3.86 3.94    Dose  ----- 1 mg 1 mg 0.5 mg   hold hold hold (hold)        Labs:    Results from last 7 days   Lab Units 12/08/17 0440 12/07/17 0431 12/06/17  0501 12/05/17 0449 12/04/17  0505 12/03/17  0508 12/02/17  0540 12/02/17  0529 12/01/17  1538 12/01/17  1139   INR  3.94 3.86 3.75 3.03 1.93 1.26 --  1.29 --  --    APTT seconds --  --  --  --  --  --  --  --  89.3* --    HEMOGLOBIN g/dL 9.4* 9.4* --  10.2* 9.3* 9.3* 10.5* --  --  9.6*   HEMATOCRIT % 29.8* 29.9* --  31.4* 29.6* 28.6* 32.4* --  --  29.3*   PLATELETS 10*3/mm3 336 312 --  275 307 270 294 --  --  285     Results from last 7 days   Lab Units 12/08/17 0440 12/07/17 2007 12/07/17  0431 12/05/17  0926  12/03/17  0530 12/02/17  0529 12/01/17  1219   SODIUM mmol/L 140 --  139 140 < > 141 133 133   POTASSIUM mmol/L 3.9 4.7 3.3* 3.6 < > 3.6 3.8 4.1   CHLORIDE mmol/L 110* --  108 110* < > 111* 101 101   CO2 mmol/L 23.0 --  24.0 20.0 < > 19.0* 20.0 22.0   BUN mg/dL 11 --  14 20 < > 40* 46* 40*   CREATININE mg/dL 0.80 --  0.90 1.00 < > 1.60* 2.10* 2.40*   CALCIUM mg/dL 8.6* --  8.5* 8.8 < > 9.1 8.0* 8.4*   BILIRUBIN mg/dL --  --  --  --  --  0.4 0.4 0.3   ALK PHOS U/L --  --  --  --  --  61 49 51   ALT (SGPT) U/L --  --  --  --  --  12 6* 6*   AST (SGOT) U/L --  --  --  --  --  27 22 26   GLUCOSE mg/dL 84 --  87 97 < > 74 " 74 104*   < > = values in this interval not displayed.       Current dietary intake: 10% to 0% oral intake. Nurse's notes indicate patient is not eating.       Assessment/Plan:   Patient was given Vitamin K 5mg IV x 1, 12/1 @ 1742.    Warfarin resumed cautiously 12/02 due to supratherapeutic admit INR, and major drug interactions.     Patient's INR is supratherapeutic again today at 3.94. Warfarin dose will be held.   Patient is a complicated case as she is clearly very acutely ill, not eating, and likely has decreased clotting factor production.   Continue to monitor for s/sx of bleeding, drug-drug interactions, and dietary intake.   Pharmacy will follow closely and dose adjust accordingly.     Martina Renner, PharmD Candidate 2018  12/8/2017  8:18 AM     Raine Steen, PharmD  12/8/2017  8:35 AM

## 2017-12-08 NOTE — PROGRESS NOTES
"    [facility] Medicine Services  PROGRESS NOTE    Patient Name: Katie Crocker  : 3/1/1923  MRN: [id number]    Date of Admission: 2017  Length of Stay: 7  Primary Care Physician: Jose Armando Sharif MD    Subjective   Subjective     CC:  Sepsis (present on admission)    Subjective:    Sleeping in bed, easily arouses.  Daughter in room.  NAD on 3 LNC, no supplemental oxygen at baseline  Dry nonproductive cough.  No complaints overnight.  No complaints of dysuria, remains incontinent. Low grade temp 99 this morning, though it is very warm in room with multiple blankets in place. No new events overnight. \"Im tired, can I go back to sleep?\"  Denies f/c, n/v/d, soa or cp.     Review of Systems    Otherwise ROS is negative except as mentioned above.    Objective   Objective     Vital Signs:   Temp:  [98.4 °F (36.9 °C)-99.3 °F (37.4 °C)] 99.3 °F (37.4 °C)  Heart Rate:  [] 136  Resp:  [20] 20  BP: (142-159)/(76-84) 159/84        Physical Exam:  Constitutional: opens eyes responsive , resting comfortably, no distress  Eyes: PERRLA, sclerae anicteric, no conjunctival injection  HENT: NCAT, mucous membranes moist  Neck: Supple, no thyromegaly, no lymphadenopathy, trachea midline  Respiratory: decreased bs bilaterally, rhonchi in RLL and right mid lobe, nonlabored respirations, 3LNC  Cardiovascular: RRR, no murmurs, rubs, or gallops, palpable pedal pulses bilaterally  Gastrointestinal: Positive bowel sounds, soft, nontender, nondistended  Musculoskeletal: No bilateral ankle edema, no clubbing or cyanosis to extremities  Psychiatric: sleepy, but awakens  Skin: No rashes    Results Reviewed:  I have personally reviewed current lab, radiology, and data and agree.      Results from last 7 days  Lab Units 17  0440 17  0431 17  0501 17  0449   WBC 10*3/mm3 14.84* 14.93*  --  18.41*   HEMOGLOBIN g/dL 9.4* 9.4*  --  10.2*   HEMATOCRIT % 29.8* 29.9*  --  31.4* "   PLATELETS 10*3/mm3 336 312  --  275   INR  3.94 3.86 3.75 3.03       Results from last 7 days  Lab Units 12/08/17  0440 12/07/17 2007 12/07/17  0431 12/05/17  0926  12/03/17  0530 12/02/17  0529   SODIUM mmol/L 140  --  139 140  < > 141 133   POTASSIUM mmol/L 3.9 4.7 3.3* 3.6  < > 3.6 3.8   CHLORIDE mmol/L 110*  --  108 110*  < > 111* 101   CO2 mmol/L 23.0  --  24.0 20.0  < > 19.0* 20.0   BUN mg/dL 11  --  14 20  < > 40* 46*   CREATININE mg/dL 0.80  --  0.90 1.00  < > 1.60* 2.10*   GLUCOSE mg/dL 84  --  87 97  < > 74 74   CALCIUM mg/dL 8.6*  --  8.5* 8.8  < > 9.1 8.0*   ALT (SGPT) U/L  --   --   --   --   --  12 6*   AST (SGOT) U/L  --   --   --   --   --  27 22   < > = values in this interval not displayed.  No results found for: BNP  No results found for: PHART    Microbiology Results Abnormal     Procedure Component Value - Date/Time    Blood Culture - Blood, [[id number]]  (Normal) Collected:  12/05/17 1420    Lab Status:  Preliminary result Specimen:  Blood from Arm, Left Updated:  12/07/17 1502     Blood Culture No growth at 2 days    Blood Culture - Blood, [[id number]]  (Normal) Collected:  12/05/17 1415    Lab Status:  Preliminary result Specimen:  Blood from Arm, Left Updated:  12/07/17 1502     Blood Culture No growth at 2 days    Urine Culture - Urine, Urine, Clean Catch [[id number]]  (Abnormal) Collected:  12/05/17 2009    Lab Status:  Final result Specimen:  Urine from Urine, Clean Catch Updated:  12/07/17 1146     Urine Culture --      20,000-30,000 CFU/mL Yeast isolated (A)    Blood Culture - Blood, [[id number]]  (Normal) Collected:  12/01/17 1148    Lab Status:  Final result Specimen:  Blood from Arm, Left Updated:  12/06/17 1231     Blood Culture No growth at 5 days    Blood Culture - Blood, [[id number]]  (Normal) Collected:  12/01/17 1140    Lab Status:  Final result Specimen:  Blood from Arm, Right Updated:  12/06/17 1231     Blood Culture No growth at 5 days    Urine Culture - Urine, Urine, Clean  Catch [[id number]]  (Abnormal)  (Susceptibility) Collected:  12/01/17 1137    Lab Status:  Final result Specimen:  Urine from Urine, Catheter Updated:  12/03/17 1143     Urine Culture --      >100,000 CFU/mL Escherichia coli (A)    Susceptibility      Escherichia coli     MARCUS     Ampicillin >16 ug/ml Resistant     Ampicillin + Sulbactam >16/8 ug/ml Resistant     Aztreonam <=8 ug/ml Susceptible     Cefepime <=8 ug/ml Susceptible     Cefotaxime <=2 ug/ml Susceptible     Ceftriaxone <=8 ug/ml Susceptible     Cefuroxime sodium <=4 ug/ml Susceptible     Cephalothin 16 ug/ml Intermediate     Ertapenem <=1 ug/ml Susceptible     Gentamicin <=4 ug/ml Susceptible     Levofloxacin <=2 ug/ml Susceptible     Meropenem <=1 ug/ml Susceptible     Nitrofurantoin <=32 ug/ml Susceptible     Piperacillin + Tazobactam <=16 ug/ml Susceptible     Tetracycline 8 ug/ml Intermediate     Tobramycin <=4 ug/ml Susceptible     Trimethoprim + Sulfamethoxazole >2/38 ug/ml Resistant                    Eosinophil Smear - Urine, Urine, Clean Catch [[id number]]  (Normal) Collected:  12/01/17 1702    Lab Status:  Final result Specimen:  Urine from Urine, Clean Catch Updated:  12/01/17 2137     Eosinophil Smear 0 % EOS/100 Cells     Narrative:       No eosinophil seen    Influenza A & B, RT PCR - Swab, Nasopharynx [[id number]]  (Normal) Collected:  12/01/17 1703    Lab Status:  Final result Specimen:  Swab from Nasopharynx Updated:  12/01/17 1820     Influenza A PCR Not Detected     Influenza B PCR Not Detected          Imaging Results (last 24 hours)     ** No results found for the last 24 hours. **        Results for orders placed during the hospital encounter of 12/01/17   Adult Transthoracic Echo Complete W/ Cont if Necessary Per Protocol    Narrative · Left ventricular systolic function is normal. Estimated EF = 55%.  · Mild aortic valve regurgitation is present.  · Mild-to-moderate mitral valve regurgitation is present  · Moderate to severe tricuspid  valve regurgitation is present.  · Calculated right ventricular systolic pressure from tricuspid   regurgitation is 55 mmHg.          I have reviewed the medications.    Assessment/Plan   Assessment / Plan     Hospital Problem List     * (Principal)Sepsis    HCAP (healthcare-associated pneumonia)    Urinary tract infection    Hypertension    Hyperlipidemia    GERD (gastroesophageal reflux disease)    CHF (congestive heart failure)    Depression    Gout    Hypothyroidism    Hemiparesis affecting right side as late effect of stroke    Atrial fibrillation    Supratherapeutic INR    Acute on chronic kidney failure    Anemia             Brief Hospital Course to date:  Katie Crocker is a 89 y.o. female with past medical history significant for hypertension, hyperlipidemia, hypothyroidism, atrial fibrillation, history of CVA, chronic kidney disease.  The patient was admitted for altered mental status and she was found to have pneumonia and UTI at the emergency room.      Assessment & Plan:  * Severe Sepsis POA  - ID following - high procalcitonin on admission, clincally not improving. I have had a discussion with daughter and she is aware of the severeity of her mothers condition.    *HCAP with CXR evidence of consolidation, on antibiotics and currently afebrile.cefepime/doxy/flagyl, ID following and managing abx , ABG reveiewed,  CXR worsening.and elevated WBC-  ABX increased to VAnc/Merrem/Doxy per ID today   * yeast in urine, will dw ID.     * E Coli UTI (POA)- Abx per ID     * Altered mental status secondary to sepsis.  stable    * Dysphagia.  Patient failed swallow test twice but then took Fees and passed. ok'd diet per ST .     * Hypertension.    *Leukocytosis- trending down now with change in abx initially, and now has remained at 14 for two days. reculture blood negative, check UA negative cx pos for yeast.     *Hypokalemia- replace    * Congestive heart failure - EF 55%, mod/severe tricuspid regurg,  elevated RVSP,  will lightly give fluids. Pt has minimal oral intake..    * Acute on Chronic Kidney Disease - improving - back to baseline now    * Supratherapeutic INR on admission -  back on warfarin, up to 3.94 today, pharmacy dosing.     * Plan to continue medical management another day or two then if no improvement to hospice or back to nh with palliative. Repeating chest x-ray today that showed no significant change from previous on 12/4/17.  There has been no change in her WBCs with current therapy over the last 24 hours.  Awaiting hospice to reevaluate today.  CM continues to follow. Will need to make definite plan with daughter about discharge tomorrow. If wanting to return back to NH, cannot do so until Monday per CM.    DVT Prophylaxis:  Anticoagulated with warfarin    CODE STATUS: Conditional Code, DNR     Disposition: TBD. Overall poor prognosis     Jia Monteiro, MOODY  12/08/17  1:41 PM

## 2017-12-08 NOTE — PROGRESS NOTES
[facility] Progress Note    Admission Date: 12/1/2017    Katie Crocker  3/1/1923  [id number]    Date: 12/8/2017    Meds:    Anti-Infectives     Ordered     Dose/Rate Route Frequency Start Stop    12/07/17 0710  vancomycin (VANCOCIN) in iso-osmotic dextrose IVPB 1 g (premix) 200 mL     Ordering Provider:  Raine Steen RPH    1,000 mg  over 60 Minutes Intravenous Once 12/07/17 0900 12/07/17 1029    12/05/17 1942  vancomycin (dosing per levels)     Ordering Provider:  Alvaro Antonio MD     Does not apply Daily 12/06/17 0900 12/13/17 0859    12/05/17 1930  meropenem (MERREM) 1 g/100 mL 0.9% NS VTB (mbp)     Ordering Provider:  Alvaro Antonio MD    1 g  over 3 Hours Intravenous Every 8 Hours 12/06/17 0200 12/11/17 0159    12/05/17 1930  meropenem (MERREM) 1 g/100 mL 0.9% NS VTB (mbp)     Ordering Provider:  Alvaro Antonio MD    1 g  over 30 Minutes Intravenous Once 12/05/17 2015 12/05/17 2046    12/05/17 1942  vancomycin IVPB 1250 mg in 250 mL NS (premix)     Ordering Provider:  Alvaro Antonio MD    20 mg/kg × 62.1 kg  over 90 Minutes Intravenous Once 12/05/17 2015 12/06/17 0000    12/05/17 1930  Pharmacy to dose vancomycin     Ordering Provider:  Alvaro Antonio MD     Does not apply Continuous PRN 12/05/17 1928 12/10/17 1927    12/01/17 1651  doxycycline (VIBRAMYCIN) 100 mg in sodium chloride 0.9 % 250 mL IVPB     Raine Steen [facility] reviewed the order on 12/06/17 0720.   Ordering Provider:  Alvaro Antonio MD    100 mg  over 60 Minutes Intravenous Every 12 Hours 12/01/17 1800 12/15/17 2359    12/01/17 1553  meropenem (MERREM) 1 g/100 mL 0.9% NS VTB (mbp)     Ordering Provider:  MOODY Ruelas    1 g  over 30 Minutes Intravenous Once 12/01/17 1630 12/01/17 1736    12/01/17 1142  vancomycin 1250 mg/250 mL 0.9% NS IVPB ([facility])     Ordering Provider:  Jacky Condon MD    20 mg/kg × 59.9 kg  over 90 Minutes Intravenous Once 12/01/17 1144 12/01/17 1444    12/01/17 1142  piperacillin-tazobactam (ZOSYN) 4.5 g in  iso-osmotic dextrose 100 mL IVPB (premix)     Ordering Provider:  Jacky Condon MD    4.5 g Intravenous Once 17 1143 17 1233          CC:  Sepsis, pna, UTI    SUBJECTIVE:  No fevers.  Appears a bit more alert today but falls asleep easily.  Family at bedside.    PE:   Vital Signs  Temp (24hrs), Av.9 °F (37.2 °C), Min:98.4 °F (36.9 °C), Max:99.3 °F (37.4 °C)    Temp  Min: 98.4 °F (36.9 °C)  Max: 99.3 °F (37.4 °C)  BP  Min: 142/76  Max: 159/84  Pulse  Min: 81  Max: 136  Resp  Min: 20  Max: 20  No Data Recorded    GENERAL: Awake, answers most questions appropriately.  A bit more alert but falls asleep easily.  HEART: RRR; No murmur, rubs, gallops.   LUNGS: Diminished at lung bilaterally, rhonchi at right mid to lower lung fields.  2 L NC.  Breathing comfortably.  ABDOMEN: Soft, nontender, nondistended. Positive bowel sounds. No rebound or guarding.  EXT:  Trace B leg edema.  SKIN: Warm and dry without cutaneous eruptions on Inspection/palpation.     Laboratory Data      Results from last 7 days  Lab Units 17  0440 17  0431 17  0449   WBC 10*3/mm3 14.84* 14.93* 18.41*   HEMOGLOBIN g/dL 9.4* 9.4* 10.2*   HEMATOCRIT % 29.8* 29.9* 31.4*   PLATELETS 10*3/mm3 336 312 275       Results from last 7 days  Lab Units 17  0440   SODIUM mmol/L 140   POTASSIUM mmol/L 3.9   CHLORIDE mmol/L 110*   CO2 mmol/L 23.0   BUN mg/dL 11   CREATININE mg/dL 0.80   GLUCOSE mg/dL 84   CALCIUM mg/dL 8.6*       Results from last 7 days  Lab Units 17  0530   ALK PHOS U/L 61   BILIRUBIN mg/dL 0.4   ALT (SGPT) U/L 12   AST (SGOT) U/L 27       Results from last 7 days  Lab Units 17  0540   SED RATE mm/hr 88*       Results from last 7 days  Lab Units 17  0529   CRP mg/dL 40.55*       Results from last 7 days  Lab Units 17  0540   LACTATE mmol/L 1.5           Results from last 7 days  Lab Units 17  0440 17  0431 17  0501   VANCOMYCIN TR mcg/mL 27.60* 13.40 36.80*          Estimated Creatinine Clearance: 40.2 mL/min (by C-G formula based on Cr of 0.8).    Pro calcitonin 1.37, down from initial of 12    Brief Urine Lab Results  (Last result in the past 365 days)      Color   Clarity   Blood   Leuk Est   Nitrite   Protein   CREAT   Urine HCG        12/05/17 2009 Yellow Clear Negative Small (1+)(A) Negative 30 mg/dL (1+)(A)                 Microbiology:    Urine Culture - Urine, Urine, Clean Catch [[id number]] (Abnormal) Collected: 12/05/17 2009        Lab Status: Final result Specimen: Urine from Urine, Clean Catch Updated: 12/07/17 1146        Urine Culture --         20,000-30,000 CFU/mL Yeast isolated (A)       Blood Culture - Blood, [[id number]] (Normal) Collected: 12/05/17 1420       Lab Status: Preliminary result Specimen: Blood from Arm, Left Updated: 12/08/17 1501        Blood Culture No growth at 3 days       Blood Culture - Blood, [[id number]] (Normal) Collected: 12/05/17 1415       Lab Status: Preliminary result Specimen: Blood from Arm, Left Updated: 12/08/17 1501        Blood Culture No growth at 3 days       Influenza A & B, RT PCR - Swab, Nasopharynx [[id number]] (Normal) Collected: 12/01/17 1703       Lab Status: Final result Specimen: Swab from Nasopharynx Updated: 12/01/17 1820        Influenza A PCR Not Detected        Influenza B PCR Not Detected       Eosinophil Smear - Urine, Urine, Clean Catch [[id number]] (Normal) Collected: 12/01/17 1702       Lab Status: Final result Specimen: Urine from Urine, Clean Catch Updated: 12/01/17 2137        Eosinophil Smear 0 % EOS/100 Cells        Narrative:         No eosinophil seen       Blood Culture - Blood, [[id number]] (Normal) Collected: 12/01/17 1148       Lab Status: Final result Specimen: Blood from Arm, Left Updated: 12/06/17 1231        Blood Culture No growth at 5 days       Blood Culture - Blood, [[id number]] (Normal) Collected: 12/01/17 1140       Lab Status: Final result Specimen: Blood from Arm, Right Updated:  12/06/17 1231        Blood Culture No growth at 5 days       Urine Culture - Urine, Urine, Clean Catch [[id number]] (Abnormal)  Collected: 12/01/17 1137       Lab Status: Final result Specimen: Urine from Urine, Catheter Updated: 12/03/17 1143        Urine Culture --         >100,000 CFU/mL Escherichia coli (A)         Susceptibility                      Radiology:  Imaging Results (last 72 hours)     Procedure Component Value Units Date/Time    XR Chest 1 View [[id number]] Collected:  12/04/17 1328     Updated:  12/04/17 1359    Narrative:       EXAMINATION: XR CHEST 1 VW- 12/04/2017     INDICATION: R50.9-Fever, unspecified; J18.9-Pneumonia, unspecified  organism; E87.70-Fluid overload, unspecified; I50.9-Heart failure,  unspecified; N39.0-Urinary tract infection, site not specified;  R09.02-Hypoxemia; I48.2-Chronic atrial fibrillation      COMPARISON: 12/01/2017     FINDINGS:   1. Bilateral diffuse ill-defined airspace opacities are seen diffusely  throughout the mid and lower lung zones with coalescent consolidation at  the bases and a left base effusion.  2. There is cardiomegaly.           Impression:       Compared to previous studies of 3 days ago, the edema and  the ill-defined airspace opacities seen most densely in the mid and  lower lung zones bilaterally with high-grade consolidation at the left  base appear to be worse and have progressed somewhat.     D:  12/04/2017  E:  12/04/2017     This report was finalized on 12/4/2017 1:57 PM by Dr. Blue Gallo MD.           I personally reviewed the film.      Discussion:   89 yo female was admitted 12/1 with sepsis, pneumonia, and UTI.  She has a h/o recurrent pneumonia and MDR UTIs.     Problem List:   --Severe sepsis POA with ARF, leukocytosis, fever, encephalopathy, and elevated procalcitonin.  WBC with some improvement after broadening of abx.  --Acute complicated recurrent UTI, cx with E. coli.  [H/o MDR UTIs; including MDR E. Coli and VRE at [facility] in  2015 - I reviewed outside records.]  --Basilar pneumonia - concern for aspiration - CXR worsened  --ARF - resolved  --Leukocytosis/neutrophilia - ongoing.   --Fever - improved  --Acute encephalopathy, metabolic.  --H/o CVA; chronic coumadin       PLAN/RECOMMENDATIONS:   Continue vancomycin and meropenem- abx broadened 12/5/17  Pharm dosing vancomycin based on levels.  Will stop doxycycline; s/p 7 days and not any further benefit for atypical coverage at this point.  WBC stable today after improvement yesterday; following.  Palliative following  Prognosis guarded.  Discussed with pt's daughters at bedside again today.  They understand risk of complications with aggressive antibiotics, including renal failure, but wanted to give it a chance.  They wish to continue at this time as she has seen some improvement.  Patient is DNR.    I will be out until Tuesday, 12/12/17.  Call partners if issues.    Alvaro Antonio MD  12/8/2017  5:06 PM

## 2017-12-08 NOTE — PROGRESS NOTES
Pharmacy Consult-Vancomycin Dosing  Katie Crocker is a  89 y.o. female receiving vancomycin therapy.     Indication: sepsis  Consulting Provider:  Dr. RITA Antonio (I.D.)  Goal Trough: 15~20    Current Antimicrobial Therapy  Doxycycline 100 mg IV q12h (12/01-12/15)  Merrem 1g IV q8h (12/06-12/11)    S/p: cefepime + flagyl (x 4 days)     Anti-Infectives       Ordered     Dose/Rate Route Frequency Start Stop      12/07/17 0710  vancomycin (VANCOCIN) in iso-osmotic dextrose IVPB 1 g (premix) 200 mL     Ordering Provider:  Raine Steen RPH    1,000 mg  over 60 Minutes Intravenous Once 12/07/17 0900 12/07/17 1029    12/05/17 1942  vancomycin (dosing per levels)     Ordering Provider:  Alvaro Antonio MD     Does not apply Daily 12/06/17 0900 12/13/17 0859    12/05/17 1930  meropenem (MERREM) 1 g/100 mL 0.9% NS VTB (mbp)     Ordering Provider:  Alvaro Antonio MD    1 g  over 3 Hours Intravenous Every 8 Hours 12/06/17 0200 12/11/17 0159    12/05/17 1930  meropenem (MERREM) 1 g/100 mL 0.9% NS VTB (mbp)     Ordering Provider:  Alvaro Antonio MD    1 g  over 30 Minutes Intravenous Once 12/05/17 2015 12/05/17 2046    12/05/17 1942  vancomycin IVPB 1250 mg in 250 mL NS (premix)     Ordering Provider:  Alvaro Antonio MD    20 mg/kg × 62.1 kg  over 90 Minutes Intravenous Once 12/05/17 2015 12/06/17 0000    12/05/17 1930  Pharmacy to dose vancomycin     Ordering Provider:  Alvaro Antonio MD     Does not apply Continuous PRN 12/05/17 1928 12/10/17 1927    12/01/17 1651  doxycycline (VIBRAMYCIN) 100 mg in sodium chloride 0.9 % 250 mL IVPB     Raine Steen RPH reviewed the order on 12/06/17 0720.   Ordering Provider:  Alvaro Antonio MD    100 mg  over 60 Minutes Intravenous Every 12 Hours 12/01/17 1800 12/15/17 2359    12/01/17 1553  meropenem (MERREM) 1 g/100 mL 0.9% NS VTB (mbp)     Ordering Provider:  MOODY Ruelas    1 g  over 30 Minutes Intravenous Once 12/01/17 1630 12/01/17 1736    12/01/17 1142  vancomycin 1250  "mg/250 mL 0.9% NS IVPB ([facility])     Ordering Provider:  Jacky Condon MD    20 mg/kg × 59.9 kg  over 90 Minutes Intravenous Once 12/01/17 1144 12/01/17 1444    12/01/17 1142  piperacillin-tazobactam (ZOSYN) 4.5 g in iso-osmotic dextrose 100 mL IVPB (premix)     Ordering Provider:  Jacky Condon MD    4.5 g Intravenous Once 12/01/17 1143 12/01/17 1233            Allergies  Allergies as of 12/01/2017 - Miguel as Reviewed 12/01/2017   Allergen Reaction Noted   • Lithium  12/01/2017       Labs    Results from last 7 days   Lab Units 12/08/17  0440 12/07/17  0431 12/05/17  0926   BUN mg/dL 11 14 20   CREATININE mg/dL 0.80 0.90 1.00       Results from last 7 days   Lab Units 12/08/17  0440 12/07/17  0431 12/05/17  0449   WBC 10*3/mm3 14.84* 14.93* 18.41*       Evaluation of Dosing     Last Dose Received in the ED/Outside Facility: Pt received 1250mg x1 in ER on 12/1/17.    Ht - 160 cm (63\")  Wt - 59.2 kg (130 lb 8 oz)    Estimated Creatinine Clearance: 40.2 mL/min (by C-G formula based on Cr of 0.8).    Intake & Output (last 3 days)         12/05 0701 - 12/06 0700 12/06 0701 - 12/07 0700 12/07 0701 - 12/08 0700 12/08 0701 - 12/09 0700      P.O. 720 118 240     IV Piggyback   3000     Total Intake(mL/kg) 720 (11.6) 118 (1.9) 3240 (54.7)     Urine (mL/kg/hr) 100 (0.1)  200 (0.1)     Stool 0 (0)  0 (0)     Total Output 100   200      Net +620 +118 +3040              Unmeasured Urine Occurrence 5 x 6 x 9 x     Unmeasured Stool Occurrence 3 x 1 x 1 x             Microbiology and Radiology  Microbiology Results (last 10 days)       Procedure Component Value - Date/Time      Urine Culture - Urine, Urine, Clean Catch [[id number]]  (Abnormal) Collected:  12/05/17 2009    Lab Status:  Final result Specimen:  Urine from Urine, Clean Catch Updated:  12/07/17 1146     Urine Culture --      20,000-30,000 CFU/mL Yeast isolated (A)    Blood Culture - Blood, [[id number]]  (Normal) Collected:  12/05/17 1420    Lab Status:  " Preliminary result Specimen:  Blood from Arm, Left Updated:  12/07/17 1502     Blood Culture No growth at 2 days    Blood Culture - Blood, [[id number]]  (Normal) Collected:  12/05/17 1415    Lab Status:  Preliminary result Specimen:  Blood from Arm, Left Updated:  12/07/17 1502     Blood Culture No growth at 2 days    Influenza A & B, RT PCR - Swab, Nasopharynx [[id number]]  (Normal) Collected:  12/01/17 1703    Lab Status:  Final result Specimen:  Swab from Nasopharynx Updated:  12/01/17 1820     Influenza A PCR Not Detected     Influenza B PCR Not Detected    Eosinophil Smear - Urine, Urine, Clean Catch [[id number]]  (Normal) Collected:  12/01/17 1702    Lab Status:  Final result Specimen:  Urine from Urine, Clean Catch Updated:  12/01/17 2137     Eosinophil Smear 0 % EOS/100 Cells     Narrative:       No eosinophil seen    Blood Culture - Blood, [[id number]]  (Normal) Collected:  12/01/17 1148    Lab Status:  Final result Specimen:  Blood from Arm, Left Updated:  12/06/17 1231     Blood Culture No growth at 5 days    Blood Culture - Blood, [[id number]]  (Normal) Collected:  12/01/17 1140    Lab Status:  Final result Specimen:  Blood from Arm, Right Updated:  12/06/17 1231     Blood Culture No growth at 5 days    Urine Culture - Urine, Urine, Clean Catch [[id number]]  (Abnormal)  (Susceptibility) Collected:  12/01/17 1137    Lab Status:  Final result Specimen:  Urine from Urine, Catheter Updated:  12/03/17 1143     Urine Culture --      >100,000 CFU/mL Escherichia coli (A)    Susceptibility        Escherichia coli     MARCUS     Ampicillin >16 ug/ml Resistant     Ampicillin + Sulbactam >16/8 ug/ml Resistant     Aztreonam <=8 ug/ml Susceptible     Cefepime <=8 ug/ml Susceptible     Cefotaxime <=2 ug/ml Susceptible     Ceftriaxone <=8 ug/ml Susceptible     Cefuroxime sodium <=4 ug/ml Susceptible     Cephalothin 16 ug/ml Intermediate     Ertapenem <=1 ug/ml Susceptible     Gentamicin <=4 ug/ml Susceptible     Levofloxacin <=2  ug/ml Susceptible     Meropenem <=1 ug/ml Susceptible     Nitrofurantoin <=32 ug/ml Susceptible     Piperacillin + Tazobactam <=16 ug/ml Susceptible     Tetracycline 8 ug/ml Intermediate     Tobramycin <=4 ug/ml Susceptible     Trimethoprim + Sulfamethoxazole >2/38 ug/ml Resistant                            Evaluation of Levels   Lab Results   Component Value Date    BRETT 27.60 (H) 12/08/2017   ~19 hour level     Assessment/Plan:  Due to patient's age, renal function, and clinical status vancomycin is currently being dosed per level.   Vancomycin random is 27.6 mcg/mL at 4:40 this morning (12/08). This reflects an ~19 hour level since last dose.   Vancomycin level will be held this morning. While trough was slightly short, anticipated true trough would still be supratherapeutic. It is speculated that patient will need a q48h regimen, if vancomycin is continued.   Vancomycin random and BMP scheduled tomorrow am.   Monitor patient's renal function, via UOP and SCr, daily.   Today patient will have completed initially planned 48 hour trail of antibiotics. Pharmacy will watch for goals of care to be established today.  Pharmacy will follow closely and dose adjust as needed.     Martina Renner, PharmD Candidate 2018  12/8/2017  7:45 AM    Carlota LyonsD  12/8/2017  8:36 AM

## 2017-12-08 NOTE — PLAN OF CARE
Problem: Patient Care Overview (Adult)  Goal: Plan of Care Review  Outcome: Ongoing (interventions implemented as appropriate)    12/08/17 0039   Coping/Psychosocial Response Interventions   Plan Of Care Reviewed With patient   Patient Care Overview   Progress no change         Problem: Fall Risk (Adult)  Goal: Identify Related Risk Factors and Signs and Symptoms  Outcome: Ongoing (interventions implemented as appropriate)    12/08/17 0039   Fall Risk   Fall Risk: Related Risk Factors fatigue/slow reaction   Fall Risk: Signs and Symptoms presence of risk factors

## 2017-12-09 LAB
ANION GAP SERPL CALCULATED.3IONS-SCNC: 10 MMOL/L (ref 3–11)
BASOPHILS # BLD AUTO: 0.06 10*3/MM3 (ref 0–0.2)
BASOPHILS NFR BLD AUTO: 0.4 % (ref 0–1)
BUN BLD-MCNC: 9 MG/DL (ref 9–23)
BUN/CREAT SERPL: 12.9 (ref 7–25)
CALCIUM SPEC-SCNC: 8.4 MG/DL (ref 8.7–10.4)
CHLORIDE SERPL-SCNC: 107 MMOL/L (ref 99–109)
CO2 SERPL-SCNC: 22 MMOL/L (ref 20–31)
CREAT BLD-MCNC: 0.7 MG/DL (ref 0.6–1.3)
DEPRECATED RDW RBC AUTO: 58.8 FL (ref 37–54)
EOSINOPHIL # BLD AUTO: 0.14 10*3/MM3 (ref 0–0.3)
EOSINOPHIL NFR BLD AUTO: 1 % (ref 0–3)
ERYTHROCYTE [DISTWIDTH] IN BLOOD BY AUTOMATED COUNT: 17.3 % (ref 11.3–14.5)
GFR SERPL CREATININE-BSD FRML MDRD: 78 ML/MIN/1.73
GLUCOSE BLD-MCNC: 68 MG/DL (ref 70–100)
HCT VFR BLD AUTO: 30.7 % (ref 34.5–44)
HGB BLD-MCNC: 9.7 G/DL (ref 11.5–15.5)
IMM GRANULOCYTES # BLD: 0.53 10*3/MM3 (ref 0–0.03)
IMM GRANULOCYTES NFR BLD: 3.8 % (ref 0–0.6)
INR PPP: 3.33
LYMPHOCYTES # BLD AUTO: 2.7 10*3/MM3 (ref 0.6–4.8)
LYMPHOCYTES NFR BLD AUTO: 19.6 % (ref 24–44)
MCH RBC QN AUTO: 30.1 PG (ref 27–31)
MCHC RBC AUTO-ENTMCNC: 31.6 G/DL (ref 32–36)
MCV RBC AUTO: 95.3 FL (ref 80–99)
MONOCYTES # BLD AUTO: 1.33 10*3/MM3 (ref 0–1)
MONOCYTES NFR BLD AUTO: 9.7 % (ref 0–12)
NEUTROPHILS # BLD AUTO: 9.01 10*3/MM3 (ref 1.5–8.3)
NEUTROPHILS NFR BLD AUTO: 65.5 % (ref 41–71)
PLAT MORPH BLD: NORMAL
PLATELET # BLD AUTO: 321 10*3/MM3 (ref 150–450)
PMV BLD AUTO: 9.7 FL (ref 6–12)
POTASSIUM BLD-SCNC: 4.4 MMOL/L (ref 3.5–5.5)
PROTHROMBIN TIME: 37.7 SECONDS (ref 9.6–11.5)
RBC # BLD AUTO: 3.22 10*6/MM3 (ref 3.89–5.14)
RBC MORPH BLD: NORMAL
SODIUM BLD-SCNC: 139 MMOL/L (ref 132–146)
VALPROATE SERPL-MCNC: 4 MCG/ML (ref 50–150)
VANCOMYCIN TROUGH SERPL-MCNC: 12.4 MCG/ML (ref 10–20)
WBC MORPH BLD: NORMAL
WBC NRBC COR # BLD: 13.77 10*3/MM3 (ref 3.5–10.8)

## 2017-12-09 PROCEDURE — 80202 ASSAY OF VANCOMYCIN: CPT | Performed by: INTERNAL MEDICINE

## 2017-12-09 PROCEDURE — 85610 PROTHROMBIN TIME: CPT | Performed by: NURSE PRACTITIONER

## 2017-12-09 PROCEDURE — 25010000002 MEROPENEM: Performed by: INTERNAL MEDICINE

## 2017-12-09 PROCEDURE — 25010000002 DIGOXIN PER 500 MCG: Performed by: INTERNAL MEDICINE

## 2017-12-09 PROCEDURE — 99233 SBSQ HOSP IP/OBS HIGH 50: CPT | Performed by: INTERNAL MEDICINE

## 2017-12-09 PROCEDURE — 25010000002 MEROPENEM

## 2017-12-09 PROCEDURE — 25010000002 VANCOMYCIN PER 500 MG

## 2017-12-09 PROCEDURE — 85025 COMPLETE CBC W/AUTO DIFF WBC: CPT | Performed by: INTERNAL MEDICINE

## 2017-12-09 PROCEDURE — 85007 BL SMEAR W/DIFF WBC COUNT: CPT | Performed by: INTERNAL MEDICINE

## 2017-12-09 PROCEDURE — 80048 BASIC METABOLIC PNL TOTAL CA: CPT | Performed by: INTERNAL MEDICINE

## 2017-12-09 PROCEDURE — 80164 ASSAY DIPROPYLACETIC ACD TOT: CPT

## 2017-12-09 RX ORDER — VANCOMYCIN HYDROCHLORIDE 1 G/200ML
1000 INJECTION, SOLUTION INTRAVENOUS ONCE
Status: COMPLETED | OUTPATIENT
Start: 2017-12-09 | End: 2017-12-09

## 2017-12-09 RX ORDER — DIGOXIN 0.25 MG/ML
125 INJECTION INTRAMUSCULAR; INTRAVENOUS ONCE
Status: COMPLETED | OUTPATIENT
Start: 2017-12-09 | End: 2017-12-09

## 2017-12-09 RX ORDER — WARFARIN SODIUM 1 MG/1
0.5 TABLET ORAL
Status: DISCONTINUED | OUTPATIENT
Start: 2017-12-09 | End: 2017-12-11

## 2017-12-09 RX ADMIN — DIGOXIN 125 MCG: 125 TABLET ORAL at 13:08

## 2017-12-09 RX ADMIN — Medication 1 CAPSULE: at 09:42

## 2017-12-09 RX ADMIN — VANCOMYCIN HYDROCHLORIDE 1000 MG: 1 INJECTION, SOLUTION INTRAVENOUS at 13:09

## 2017-12-09 RX ADMIN — WARFARIN SODIUM 0.5 MG: 1 TABLET ORAL at 18:02

## 2017-12-09 RX ADMIN — MIRTAZAPINE 7.5 MG: 15 TABLET, FILM COATED ORAL at 21:51

## 2017-12-09 RX ADMIN — DIGOXIN 125 MCG: 0.25 INJECTION INTRAMUSCULAR; INTRAVENOUS at 18:03

## 2017-12-09 RX ADMIN — MEROPENEM 1 G: 1 INJECTION, POWDER, FOR SOLUTION INTRAVENOUS at 01:25

## 2017-12-09 RX ADMIN — METOPROLOL TARTRATE 25 MG: 25 TABLET ORAL at 18:02

## 2017-12-09 RX ADMIN — NYSTATIN 1 APPLICATION: 100000 POWDER TOPICAL at 01:25

## 2017-12-09 RX ADMIN — LEVOTHYROXINE SODIUM 50 MCG: 50 TABLET ORAL at 09:43

## 2017-12-09 RX ADMIN — NYSTATIN: 100000 POWDER TOPICAL at 09:46

## 2017-12-09 RX ADMIN — CITALOPRAM HYDROBROMIDE 20 MG: 20 TABLET ORAL at 09:42

## 2017-12-09 RX ADMIN — MEROPENEM 1 G: 1 INJECTION, POWDER, FOR SOLUTION INTRAVENOUS at 13:09

## 2017-12-09 RX ADMIN — NYSTATIN: 100000 POWDER TOPICAL at 22:00

## 2017-12-09 RX ADMIN — ALLOPURINOL 150 MG: 300 TABLET ORAL at 09:43

## 2017-12-09 RX ADMIN — DIVALPROEX SODIUM 500 MG: 500 TABLET, DELAYED RELEASE ORAL at 09:48

## 2017-12-09 RX ADMIN — ATORVASTATIN CALCIUM 10 MG: 10 TABLET, FILM COATED ORAL at 09:42

## 2017-12-09 NOTE — PLAN OF CARE
Problem: Patient Care Overview (Adult)  Goal: Plan of Care Review  Outcome: Ongoing (interventions implemented as appropriate)    12/09/17 0253   Coping/Psychosocial Response Interventions   Plan Of Care Reviewed With patient   Patient Care Overview   Progress progress toward functional goals is gradual   Outcome Evaluation   Outcome Summary/Follow up Plan vss, no issues overnight will continue to monitor         Problem: Fall Risk (Adult)  Goal: Identify Related Risk Factors and Signs and Symptoms  Outcome: Ongoing (interventions implemented as appropriate)

## 2017-12-09 NOTE — PROGRESS NOTES
"Pharmacy Consult  -  Warfarin    Katie Crocker is a 89 y.o. female   Height - 160 cm (63\")  Weight - 56.1 kg (123 lb 9.6 oz)    Consulting Provider: - MOODY Ruelas  Indication: - afib  Goal INR: - 2-3  Home Regimen:   - 2 mg on fridays   - 3 mg all other days    Drug-Drug Interactions with current regimen:  Citalopram 20 mg po daily (increase risk of bleeding)  Mirtazapine 7.5 mg po daily (increase risk of bleeding)  Divalproex 500 mg po daily (increase risk of bleeding)       Warfarin Dosing During Admission:    Date  12/1 12/2 12/3 12/4 12/05 12/06 12/07 12/08 12/9   INR  aptt too high to measure inr -   Vitamin K 5mg 1.29 1.26 1.93 3.03 3.75 3.86 3.94 3.33   Dose  ----- 1 mg 1 mg 0.5 mg   hold hold hold hold 0.5mg       Labs:    Results from last 7 days   Lab Units 12/09/17 0449 12/08/17 0440 12/07/17 0431 12/06/17  0501 12/05/17 0449 12/04/17  0505 12/03/17  0508   INR  3.33 3.94 3.86 3.75 3.03 1.93 1.26   HEMOGLOBIN g/dL 9.7* 9.4* 9.4* --  10.2* 9.3* 9.3*   HEMATOCRIT % 30.7* 29.8* 29.9* --  31.4* 29.6* 28.6*   PLATELETS 10*3/mm3 321 336 312 --  275 307 270     Results from last 7 days   Lab Units 12/09/17 0449 12/08/17 0440 12/07/17 2007 12/07/17  0431  12/03/17  0530   SODIUM mmol/L 139 140 --  139 < > 141   POTASSIUM mmol/L 4.4 3.9 4.7 3.3* < > 3.6   CHLORIDE mmol/L 107 110* --  108 < > 111*   CO2 mmol/L 22.0 23.0 --  24.0 < > 19.0*   BUN mg/dL 9 11 --  14 < > 40*   CREATININE mg/dL 0.70 0.80 --  0.90 < > 1.60*   CALCIUM mg/dL 8.4* 8.6* --  8.5* < > 9.1   BILIRUBIN mg/dL --  --  --  --  --  0.4   ALK PHOS U/L --  --  --  --  --  61   ALT (SGPT) U/L --  --  --  --  --  12   AST (SGOT) U/L --  --  --  --  --  27   GLUCOSE mg/dL 68* 84 --  87 < > 74   < > = values in this interval not displayed.       Current dietary intake: 0-50% oral intake. Nurse's notes indicate patient is not eating.   Diet Order   Procedures   • Diet Soft Texture; Ground; Thin       Assessment/Plan:     12/9 INR - " 3.33, decreased from 3.94  Patient was given Vitamin K 5mg IV x 1, 12/1 @ 1742. (effect diminished at this point)   Warfarin resumed cautiously 12/02 x3 doses, then held for 4 days due to supratherapeutic INR, and major drug interactions.      With decreasing INR, will restart warfarin 0.5mg daily  Continue to monitor for s/sx of bleeding, drug-drug interactions, and dietary intake.   Pharmacy will follow closely and dose adjust accordingly.     Thanks,  Dilan Foster RPH  12/9/2017  9:07 AM

## 2017-12-09 NOTE — PROGRESS NOTES
"Pharmacy Consult-Vancomycin Dosing  Katie Crocker is a  89 y.o. female receiving vancomycin therapy.     Indication: sepsis  Consulting Provider:  Dr. RITA Antonio (I.D.)  Goal Trough: 15~20    Current Antimicrobial Therapy    Vancomycin dosing per levels  Merrem 1g IV q8h (12/06-12/11)    S/p: cefepime + flagyl (x 4 days)     Allergies  Allergies as of 12/01/2017 - Miguel as Reviewed 12/01/2017   Allergen Reaction Noted   • Lithium  12/01/2017       Labs  Temp Readings from Last 3 Encounters:   12/09/17 97.4 °F (36.3 °C) (Oral)     Results from last 7 days   Lab Units 12/08/17 0440 12/07/17 0431 12/05/17 0449   WBC 10*3/mm3 14.84* 14.93* 18.41*   HEMOGLOBIN g/dL 9.4* 9.4* 10.2*   HEMATOCRIT % 29.8* 29.9* 31.4*   PLATELETS 10*3/mm3 336 312 275    Results from last 7 days   Lab Units 12/09/17 0449 12/08/17 0440 12/07/17 2007 12/07/17 0431   SODIUM mmol/L 139 140 --  139   POTASSIUM mmol/L 4.4 3.9 4.7 3.3*   CHLORIDE mmol/L 107 110* --  108   CO2 mmol/L 22.0 23.0 --  24.0   BUN mg/dL 9 11 --  14   CREATININE mg/dL 0.70 0.80 --  0.90   GLUCOSE mg/dL 68* 84 --  87   CALCIUM mg/dL 8.4* 8.6* --  8.5*      Evaluation of Dosing     Ht - 160 cm (63\")  Wt - 56.1 kg (123 lb 9.6 oz)    Estimated Creatinine Clearance: 38.1 mL/min (by C-G formula based on Cr of 0.7).    Intake & Output (last 3 days)         12/06 0701 - 12/07 0700 12/07 0701 - 12/08 0700 12/08 0701 - 12/09 0700 12/09 0701 - 12/10 0700      P.O. 118 240 480     IV Piggyback  3000 100     Total Intake(mL/kg) 118 (1.9) 3240 (54.7) 580 (10.3)     Urine (mL/kg/hr)  200 (0.1)      Stool  0 (0)      Total Output   200        Net +118 +3040 +580              Unmeasured Urine Occurrence 6 x 9 x 6 x 1 x    Unmeasured Stool Occurrence 1 x 1 x 4 x             Microbiology and Radiology    Specimen: Urine from Urine, Clean Catch Updated: 12/07/17 1146         Urine Culture --         20,000-30,000 CFU/mL Yeast isolated (A)      Specimen: Blood from Arm, Left Updated: " 12/08/17 1501        Blood Culture No growth at 3 days x2     Specimen Information: Urine, Catheter; Urine        Culture  12/1        >100,000 CFU/mL Escherichia coli (A)           Evaluation of Levels   Lab Results   Component Value Date    BRETT 12.40 12/09/2017   ~19 hour level     Assessment/Plan:    Vancomycin doses       - 12/1 Vancomycin 1250mg @1315       - 12/5 Vancomycin 1000mg @2230       - 12/7 Vancomycin 1000mg @0930          12/9 Vancomycin random level - 12.4 mcg/ml @0440  Vancomycin level drawn 42 hours following last dose    Will re-dose with Vancomycin 1000mg 12/9  Vancomycin random and BMP 12/10   Monitor s/s nephrotoxicity: advanced age, acute on chronic renal failure, with wt - 56.1kg SCr may not be reflective of CrCl.  Will reduce meropenem to q12h  Pharmacy will follow closely and dose adjust as needed.     Thanks,  Dilan Foster [facility]  12/9/2017  8:06 AM

## 2017-12-09 NOTE — PROGRESS NOTES
[facility] Medicine Services  PROGRESS NOTE    Patient Name: Katie Crocker  : 3/1/1923  MRN: [id number]    Date of Admission: 2017  Length of Stay: 8  Primary Care Physician: Jose Armando Sharif MD    Subjective   Subjective     CC:  Sepsis (present on admission)    Subjective:    Denies pain, little appetite but no overt n/v. No dyspnea currently    Review of Systems    Otherwise ROS is negative except as mentioned above.  No f/c, no headache, no vision change, no abd pain, no n/v/d    Objective   Objective     Vital Signs:   Temp:  [97.4 °F (36.3 °C)-98.4 °F (36.9 °C)] 97.4 °F (36.3 °C)  Heart Rate:  [] 132  Resp:  [18-20] 18  BP: (136-172)/(61-94) 136/61        Physical Exam:  Constitutional: opens eyes responsive , resting comfortably, no distress, pleasant affect, oriented to year but not month, answers simple questions appropriately  Eyes: PERRLA, sclerae anicteric, no conjunctival injection  HENT: NCAT, mucous membranes moist  Neck: Supple, no thyromegaly, no lymphadenopathy, trachea midline  Respiratory: decreased bs bilaterally, rhonchi in RLL and right mid lobe, nonlabored respirations, 3LNC  Cardiovascular: RRR, no murmurs, rubs, or gallops, palpable pedal pulses bilaterally  Gastrointestinal: Positive bowel sounds, soft, nontender, nondistended  Musculoskeletal: No bilateral ankle edema, no clubbing or cyanosis to extremities  Appropriate Affect  Skin: No rashes    Results Reviewed:  I have personally reviewed current lab, radiology, and data and agree.      Results from last 7 days  Lab Units 17   WBC 10*3/mm3 13.77* 14.84* 14.93*   HEMOGLOBIN g/dL 9.7* 9.4* 9.4*   HEMATOCRIT % 30.7* 29.8* 29.9*   PLATELETS 10*3/mm3 321 336 312   INR  3.33 3.94 3.86       Results from last 7 days  Lab Units 1730   SODIUM mmol/L 139 140  --  139  < > 141    POTASSIUM mmol/L 4.4 3.9 4.7 3.3*  < > 3.6   CHLORIDE mmol/L 107 110*  --  108  < > 111*   CO2 mmol/L 22.0 23.0  --  24.0  < > 19.0*   BUN mg/dL 9 11  --  14  < > 40*   CREATININE mg/dL 0.70 0.80  --  0.90  < > 1.60*   GLUCOSE mg/dL 68* 84  --  87  < > 74   CALCIUM mg/dL 8.4* 8.6*  --  8.5*  < > 9.1   ALT (SGPT) U/L  --   --   --   --   --  12   AST (SGOT) U/L  --   --   --   --   --  27   < > = values in this interval not displayed.  No results found for: BNP  No results found for: PHART    Microbiology Results Abnormal     Procedure Component Value - Date/Time    Blood Culture - Blood, [[id number]]  (Normal) Collected:  12/05/17 1420    Lab Status:  Preliminary result Specimen:  Blood from Arm, Left Updated:  12/09/17 1501     Blood Culture No growth at 4 days    Blood Culture - Blood, [[id number]]  (Normal) Collected:  12/05/17 1415    Lab Status:  Preliminary result Specimen:  Blood from Arm, Left Updated:  12/09/17 1501     Blood Culture No growth at 4 days    Urine Culture - Urine, Urine, Clean Catch [[id number]]  (Abnormal) Collected:  12/05/17 2009    Lab Status:  Final result Specimen:  Urine from Urine, Clean Catch Updated:  12/07/17 1146     Urine Culture --      20,000-30,000 CFU/mL Yeast isolated (A)    Blood Culture - Blood, [[id number]]  (Normal) Collected:  12/01/17 1148    Lab Status:  Final result Specimen:  Blood from Arm, Left Updated:  12/06/17 1231     Blood Culture No growth at 5 days    Blood Culture - Blood, [[id number]]  (Normal) Collected:  12/01/17 1140    Lab Status:  Final result Specimen:  Blood from Arm, Right Updated:  12/06/17 1231     Blood Culture No growth at 5 days    Urine Culture - Urine, Urine, Clean Catch [[id number]]  (Abnormal)  (Susceptibility) Collected:  12/01/17 1137    Lab Status:  Final result Specimen:  Urine from Urine, Catheter Updated:  12/03/17 1143     Urine Culture --      >100,000 CFU/mL Escherichia coli (A)    Susceptibility      Escherichia coli     MARCUS      Ampicillin >16 ug/ml Resistant     Ampicillin + Sulbactam >16/8 ug/ml Resistant     Aztreonam <=8 ug/ml Susceptible     Cefepime <=8 ug/ml Susceptible     Cefotaxime <=2 ug/ml Susceptible     Ceftriaxone <=8 ug/ml Susceptible     Cefuroxime sodium <=4 ug/ml Susceptible     Cephalothin 16 ug/ml Intermediate     Ertapenem <=1 ug/ml Susceptible     Gentamicin <=4 ug/ml Susceptible     Levofloxacin <=2 ug/ml Susceptible     Meropenem <=1 ug/ml Susceptible     Nitrofurantoin <=32 ug/ml Susceptible     Piperacillin + Tazobactam <=16 ug/ml Susceptible     Tetracycline 8 ug/ml Intermediate     Tobramycin <=4 ug/ml Susceptible     Trimethoprim + Sulfamethoxazole >2/38 ug/ml Resistant                    Eosinophil Smear - Urine, Urine, Clean Catch [[id number]]  (Normal) Collected:  12/01/17 1702    Lab Status:  Final result Specimen:  Urine from Urine, Clean Catch Updated:  12/01/17 2137     Eosinophil Smear 0 % EOS/100 Cells     Narrative:       No eosinophil seen    Influenza A & B, RT PCR - Swab, Nasopharynx [[id number]]  (Normal) Collected:  12/01/17 1703    Lab Status:  Final result Specimen:  Swab from Nasopharynx Updated:  12/01/17 1820     Influenza A PCR Not Detected     Influenza B PCR Not Detected          Imaging Results (last 24 hours)     Procedure Component Value Units Date/Time    XR Chest 1 View [[id number]] Collected:  12/08/17 1825     Updated:  12/08/17 1825    Narrative:          EXAMINATION: XR CHEST 1 VW - 12/08/2017     INDICATION: R50.9-Fever, unspecified; J18.9-Pneumonia, unspecified  organism; E87.70-Fluid overload, unspecified; I50.9-Heart failure,  unspecified; N39.0-Urinary tract infection, site not specified;  R09.02-Hypoxemia; I48.2-Chronic atrial fibrillation.      COMPARISON: 12/04/2017.     FINDINGS: There is bilateral airspace disease relatively sparing the  left upper lobe. The heart is slightly large. There has been little  change since the previous examination of 12/04/2017.            Impression:       Bilateral airspace disease, not significantly changed since  12/04/2017.     DICTATED:     12/08/2017  EDITED:          12/08/2017                       Results for orders placed during the hospital encounter of 12/01/17   Adult Transthoracic Echo Complete W/ Cont if Necessary Per Protocol    Narrative · Left ventricular systolic function is normal. Estimated EF = 55%.  · Mild aortic valve regurgitation is present.  · Mild-to-moderate mitral valve regurgitation is present  · Moderate to severe tricuspid valve regurgitation is present.  · Calculated right ventricular systolic pressure from tricuspid   regurgitation is 55 mmHg.          I have reviewed the medications.    Assessment/Plan   Assessment / Plan     Hospital Problem List     * (Principal)Sepsis    HCAP (healthcare-associated pneumonia)    Urinary tract infection    Hypertension    Hyperlipidemia    GERD (gastroesophageal reflux disease)    CHF (congestive heart failure)    Depression    Gout    Hypothyroidism    Hemiparesis affecting right side as late effect of stroke    Atrial fibrillation    Supratherapeutic INR    Acute on chronic kidney failure    Anemia             Brief Hospital Course to date:  Katie Crocker is a 89 y.o. female with past medical history significant for hypertension, hyperlipidemia, hypothyroidism, atrial fibrillation, history of CVA, chronic kidney disease.  The patient was admitted for altered mental status and she was found to have pneumonia and UTI at the emergency room.      Assessment & Plan:  ----------------------------------------------------  -Severe sepsis (poa)   -Acute complicated recurrent UTI, e.coli   -w/ prior history of MDR uti's  -Bibasilar Pneumonia, likely due to intermittent/silent aspiration vs HCAP  -Dysphagia (improved, now on mechanical soft, thins)   -failed initial evaluations, then passed FEES on 12/3/17 by SLP  -S/p KATIE (resolved)  -Hx Afib, with intermittent RVR this  admission   -on digoxin and metoprolol as outpatient   -coumadin  -Hx Valvular heart disease   -12/1/17: EF 55%, mild AI; mild-mod MR-mod-severe TR; ervsp 55mmhg  -Pulm HTN (ervsp 55mmhg)  -Supratherapeutic inr  -Encephalopathy, metabolic  -Hx CVA  --------------------------------------------------------  Plan:  --------------------------------------------------------  -continue merrem & vanc (doxy d/c'd after 7 days rx per ID recs)  -daily inr (holding coumadin for supratherapeutic inr)  -tachycardic/afib rvr:    -extra dose digoxin 125mcg iv x 1 (continue daily 125mcg po daily dose)   -restart metoprolol at decrease dose (25mg po bid)     -palliative care/hospice following monitoring clinical progress and assisting w/ goals/limits of care    *keeping current medical management (iv medications/antibiotics) over the weekend, then at that point, depending on clinical course, then consider back to nursing home w/ hospice vs palliative (or inpatient hospice services if declining clinically)        DVT Prophylaxis:  Anticoagulated with warfarin    CODE STATUS: Conditional Code, DNR/DNI    Disposition: TBD. Overall poor prognosis     Tomi Jimenez MD  12/09/17  5:39 PM

## 2017-12-10 LAB
BACTERIA SPEC AEROBE CULT: NORMAL
BACTERIA SPEC AEROBE CULT: NORMAL
INR PPP: 2.73
PROTHROMBIN TIME: 30.7 SECONDS (ref 9.6–11.5)
VALPROATE SERPL-MCNC: 2 MCG/ML (ref 50–150)
VANCOMYCIN TROUGH SERPL-MCNC: 20.3 MCG/ML (ref 10–20)

## 2017-12-10 PROCEDURE — 25010000002 VANCOMYCIN PER 500 MG

## 2017-12-10 PROCEDURE — 80164 ASSAY DIPROPYLACETIC ACD TOT: CPT

## 2017-12-10 PROCEDURE — 85610 PROTHROMBIN TIME: CPT | Performed by: NURSE PRACTITIONER

## 2017-12-10 PROCEDURE — 80202 ASSAY OF VANCOMYCIN: CPT | Performed by: INTERNAL MEDICINE

## 2017-12-10 PROCEDURE — 99232 SBSQ HOSP IP/OBS MODERATE 35: CPT | Performed by: INTERNAL MEDICINE

## 2017-12-10 PROCEDURE — 25010000002 MEROPENEM

## 2017-12-10 RX ADMIN — LEVOTHYROXINE SODIUM 50 MCG: 50 TABLET ORAL at 09:34

## 2017-12-10 RX ADMIN — MEROPENEM 1 G: 1 INJECTION, POWDER, FOR SOLUTION INTRAVENOUS at 12:28

## 2017-12-10 RX ADMIN — NYSTATIN: 100000 POWDER TOPICAL at 09:30

## 2017-12-10 RX ADMIN — Medication 1 CAPSULE: at 09:29

## 2017-12-10 RX ADMIN — CITALOPRAM HYDROBROMIDE 20 MG: 20 TABLET ORAL at 09:30

## 2017-12-10 RX ADMIN — DIVALPROEX SODIUM 500 MG: 500 TABLET, DELAYED RELEASE ORAL at 09:29

## 2017-12-10 RX ADMIN — MEROPENEM 1 G: 1 INJECTION, POWDER, FOR SOLUTION INTRAVENOUS at 00:16

## 2017-12-10 RX ADMIN — MIRTAZAPINE 7.5 MG: 15 TABLET, FILM COATED ORAL at 20:40

## 2017-12-10 RX ADMIN — DIGOXIN 125 MCG: 125 TABLET ORAL at 12:28

## 2017-12-10 RX ADMIN — ACETAMINOPHEN 650 MG: 325 TABLET, FILM COATED ORAL at 06:45

## 2017-12-10 RX ADMIN — ATORVASTATIN CALCIUM 10 MG: 10 TABLET, FILM COATED ORAL at 09:29

## 2017-12-10 RX ADMIN — WARFARIN SODIUM 0.5 MG: 1 TABLET ORAL at 18:10

## 2017-12-10 RX ADMIN — ALLOPURINOL 150 MG: 300 TABLET ORAL at 09:30

## 2017-12-10 RX ADMIN — METOPROLOL TARTRATE 25 MG: 25 TABLET ORAL at 20:40

## 2017-12-10 RX ADMIN — MEROPENEM 1 G: 1 INJECTION, POWDER, FOR SOLUTION INTRAVENOUS at 23:33

## 2017-12-10 RX ADMIN — METOPROLOL TARTRATE 25 MG: 25 TABLET ORAL at 09:30

## 2017-12-10 RX ADMIN — NYSTATIN: 100000 POWDER TOPICAL at 20:40

## 2017-12-10 RX ADMIN — VANCOMYCIN HYDROCHLORIDE 750 MG: 750 INJECTION, SOLUTION INTRAVENOUS at 16:10

## 2017-12-10 NOTE — PLAN OF CARE
Problem: Patient Care Overview (Adult)  Goal: Plan of Care Review  Outcome: Ongoing (interventions implemented as appropriate)    12/10/17 1010   Patient Care Overview   Progress no change   Outcome Evaluation   Outcome Summary/Follow up Plan continue current plan of care. conditonal code

## 2017-12-10 NOTE — PROGRESS NOTES
[facility] Medicine Services  PROGRESS NOTE    Patient Name: Katie Crocker  : 3/1/1923  MRN: [id number]    Date of Admission: 2017  Length of Stay: 9  Primary Care Physician: Jose Armando Sharif MD    Subjective   Subjective     CC:  Sepsis (present on admission)    Subjective:    Denies pain, no hungry. Didn't eat breakfast, was wanting something to eat at lunch when I saw patient. Denied dyspnea. Did admit to feeling weak    Review of Systems  No headache, no vision changes, no nausea  Otherwise ROS is negative except as mentioned above.  No f/c, no headache, no vision change, no abd pain, no n/v/d    Objective   Objective     Vital Signs:   Temp:  [97.7 °F (36.5 °C)] 97.7 °F (36.5 °C)  Heart Rate:  [61-77] 61  Resp:  [18-20] 18  BP: (131-135)/(72-96) 131/72        Physical Exam:  Alert, oriented to year, month. Frail appearing  [place], oroph clear  irr irr, regular rate  Faint bibasilar rhonchi, normal effort at rest  abd soft, nontender  No cce  No extremity rash    Results Reviewed:  I have personally reviewed current lab, radiology, and data and agree.      Results from last 7 days  Lab Units 12/10/17  05017   WBC 10*3/mm3  --  13.77* 14.84* 14.93*   HEMOGLOBIN g/dL  --  9.7* 9.4* 9.4*   HEMATOCRIT %  --  30.7* 29.8* 29.9*   PLATELETS 10*3/mm3  --  321 336 312   INR  2.73 3.33 3.94 3.86       Results from last 7 days  Lab Units 17   SODIUM mmol/L 139 140  --  139   POTASSIUM mmol/L 4.4 3.9 4.7 3.3*   CHLORIDE mmol/L 107 110*  --  108   CO2 mmol/L 22.0 23.0  --  24.0   BUN mg/dL 9 11  --  14   CREATININE mg/dL 0.70 0.80  --  0.90   GLUCOSE mg/dL 68* 84  --  87   CALCIUM mg/dL 8.4* 8.6*  --  8.5*     No results found for: BNP  No results found for: PHART    Microbiology Results Abnormal     Procedure Component Value - Date/Time    Blood Culture - Blood, [[id number]]  (Normal)  Collected:  12/05/17 1420    Lab Status:  Final result Specimen:  Blood from Arm, Left Updated:  12/10/17 1501     Blood Culture No growth at 5 days    Blood Culture - Blood, [[id number]]  (Normal) Collected:  12/05/17 1415    Lab Status:  Final result Specimen:  Blood from Arm, Left Updated:  12/10/17 1501     Blood Culture No growth at 5 days    Urine Culture - Urine, Urine, Clean Catch [[id number]]  (Abnormal) Collected:  12/05/17 2009    Lab Status:  Final result Specimen:  Urine from Urine, Clean Catch Updated:  12/07/17 1146     Urine Culture --      20,000-30,000 CFU/mL Yeast isolated (A)    Blood Culture - Blood, [[id number]]  (Normal) Collected:  12/01/17 1148    Lab Status:  Final result Specimen:  Blood from Arm, Left Updated:  12/06/17 1231     Blood Culture No growth at 5 days    Blood Culture - Blood, [[id number]]  (Normal) Collected:  12/01/17 1140    Lab Status:  Final result Specimen:  Blood from Arm, Right Updated:  12/06/17 1231     Blood Culture No growth at 5 days    Urine Culture - Urine, Urine, Clean Catch [[id number]]  (Abnormal)  (Susceptibility) Collected:  12/01/17 1137    Lab Status:  Final result Specimen:  Urine from Urine, Catheter Updated:  12/03/17 1143     Urine Culture --      >100,000 CFU/mL Escherichia coli (A)    Susceptibility      Escherichia coli     MARCUS     Ampicillin >16 ug/ml Resistant     Ampicillin + Sulbactam >16/8 ug/ml Resistant     Aztreonam <=8 ug/ml Susceptible     Cefepime <=8 ug/ml Susceptible     Cefotaxime <=2 ug/ml Susceptible     Ceftriaxone <=8 ug/ml Susceptible     Cefuroxime sodium <=4 ug/ml Susceptible     Cephalothin 16 ug/ml Intermediate     Ertapenem <=1 ug/ml Susceptible     Gentamicin <=4 ug/ml Susceptible     Levofloxacin <=2 ug/ml Susceptible     Meropenem <=1 ug/ml Susceptible     Nitrofurantoin <=32 ug/ml Susceptible     Piperacillin + Tazobactam <=16 ug/ml Susceptible     Tetracycline 8 ug/ml Intermediate     Tobramycin <=4 ug/ml Susceptible      Trimethoprim + Sulfamethoxazole >2/38 ug/ml Resistant                    Eosinophil Smear - Urine, Urine, Clean Catch [[id number]]  (Normal) Collected:  12/01/17 1702    Lab Status:  Final result Specimen:  Urine from Urine, Clean Catch Updated:  12/01/17 2137     Eosinophil Smear 0 % EOS/100 Cells     Narrative:       No eosinophil seen    Influenza A & B, RT PCR - Swab, Nasopharynx [[id number]]  (Normal) Collected:  12/01/17 1703    Lab Status:  Final result Specimen:  Swab from Nasopharynx Updated:  12/01/17 1820     Influenza A PCR Not Detected     Influenza B PCR Not Detected          Imaging Results (last 24 hours)     ** No results found for the last 24 hours. **        Results for orders placed during the hospital encounter of 12/01/17   Adult Transthoracic Echo Complete W/ Cont if Necessary Per Protocol    Narrative · Left ventricular systolic function is normal. Estimated EF = 55%.  · Mild aortic valve regurgitation is present.  · Mild-to-moderate mitral valve regurgitation is present  · Moderate to severe tricuspid valve regurgitation is present.  · Calculated right ventricular systolic pressure from tricuspid   regurgitation is 55 mmHg.          I have reviewed the medications.    Assessment/Plan   Assessment / Plan     Hospital Problem List     * (Principal)Sepsis    HCAP (healthcare-associated pneumonia)    Urinary tract infection    Hypertension    Hyperlipidemia    GERD (gastroesophageal reflux disease)    CHF (congestive heart failure)    Depression    Gout    Hypothyroidism    Hemiparesis affecting right side as late effect of stroke    Atrial fibrillation    Supratherapeutic INR    Acute on chronic kidney failure    Anemia             Brief Hospital Course to date:  Katie Crocker is a 89 y.o. female with past medical history significant for hypertension, hyperlipidemia, hypothyroidism, atrial fibrillation, history of CVA, chronic kidney disease.  The patient was admitted for altered mental  status and she was found to have pneumonia and UTI at the emergency room.      Assessment & Plan:  ----------------------------------------------------  -Severe sepsis (poa)   -Acute complicated recurrent UTI, e.coli   -w/ prior history of MDR uti's  -Bibasilar Pneumonia, likely due to intermittent/silent aspiration vs HCAP  -Dysphagia (improved, now on mechanical soft, thins)   -failed initial evaluations, then passed FEES on 12/3/17 by SLP  -S/p KATIE (resolved)  -Hx Afib, with intermittent RVR this admission   -on digoxin and metoprolol as outpatient   -coumadin  -Hx Valvular heart disease   -12/1/17: EF 55%, mild AI; mild-mod MR-mod-severe TR; ervsp 55mmhg  -Pulm HTN (ervsp 55mmhg)  -Supratherapeutic inr  -Encephalopathy, metabolic  -Hx CVA  -DNR/DNI  --------------------------------------------------------  Plan:  --------------------------------------------------------  -continue merrem & vanc (doxy was d/c'd after 7 days rx per ID recs)  -cbc, bmp (prognostic labs)  -daily inr (holding coumadin for supratherapeutic inr)  -tachycardic/afib rvr:    -extra dose digoxin 125mcg iv x 1 was given on 12/9/17 (continued daily 125mcg po daily dose)   -restarted metoprolol at decrease dose (25mg po bid) on 12/9/17  -asked nurse to document percentage tray eating  -palliative/hospice following, depending on clinical course possible back to nursing facility (w/ palliative vs hospice care)          DVT Prophylaxis:  Anticoagulated with warfarin    CODE STATUS: Conditional Code, DNR/DNI    Disposition: TBD. Overall poor prognosis     Tomi Jimenez MD  12/10/17  3:55 PM

## 2017-12-10 NOTE — PROGRESS NOTES
"Pharmacy Consult-Vancomycin Dosing  Katie Crocker is a  89 y.o. female receiving vancomycin therapy.     Indication: sepsis  Consulting Provider:  Dr. RITA Antonio (I.D.)  Goal Trough: 15~20    Current Antimicrobial Therapy    Vancomycin dosing per levels (day 10)  Merrem 1g IV q8h (day 5)    S/p: cefepime + flagyl (x 4 days)     Allergies  Allergies as of 12/01/2017 - Miguel as Reviewed 12/01/2017   Allergen Reaction Noted   • Lithium  12/01/2017       Labs  Temp Readings from Last 3 Encounters:   12/09/17 97.4 °F (36.3 °C) (Oral)     Results from last 7 days   Lab Units 12/09/17 0449 12/08/17 0440 12/07/17 0431   WBC 10*3/mm3 13.77* 14.84* 14.93*   HEMOGLOBIN g/dL 9.7* 9.4* 9.4*   HEMATOCRIT % 30.7* 29.8* 29.9*   PLATELETS 10*3/mm3 321 336 312    Results from last 7 days   Lab Units 12/09/17 0449 12/08/17 0440 12/07/17 2007 12/07/17 0431   SODIUM mmol/L 139 140 --  139   POTASSIUM mmol/L 4.4 3.9 4.7 3.3*   CHLORIDE mmol/L 107 110* --  108   CO2 mmol/L 22.0 23.0 --  24.0   BUN mg/dL 9 11 --  14   CREATININE mg/dL 0.70 0.80 --  0.90   GLUCOSE mg/dL 68* 84 --  87   CALCIUM mg/dL 8.4* 8.6* --  8.5*      Evaluation of Dosing     Ht - 160 cm (63\")  Wt - 56.1 kg (123 lb 9.6 oz)    Estimated Creatinine Clearance: 38.1 mL/min (by C-G formula based on Cr of 0.7).    Intake & Output (last 3 days)         12/07 0701 - 12/08 0700 12/08 0701 - 12/09 0700 12/09 0701 - 12/10 0700 12/10 0701 - 12/11 0700      P.O. 240 480 720     IV Piggyback 3000 100      Total Intake(mL/kg) 3240 (54.7) 580 (10.3) 720 (12.8)     Urine (mL/kg/hr) 200 (0.1)       Stool 0 (0)       Total Output 200          Net +3040 +580 +720              Unmeasured Urine Occurrence 9 x 6 x 8 x     Unmeasured Stool Occurrence 1 x 4 x              Microbiology and Radiology    Specimen: Urine from Urine, Clean Catch Updated: 12/07/17 1146         Urine Culture --         20,000-30,000 CFU/mL Yeast isolated (A)      Specimen: Blood from Arm, Left Updated: " 12/08/17 1501        Blood Culture No growth at 3 days x2     Specimen Information: Urine, Catheter; Urine        Culture  12/1        >100,000 CFU/mL Escherichia coli (A)           Evaluation of Levels   Lab Results   Component Value Date    VANCOTROUGH 20.30 (H) 12/10/2017   ~19 hour level     Assessment/Plan:    Vancomycin doses       - 12/1 Vancomycin 1250mg @1315       - 12/5 Vancomycin 1000mg @2230       - 12/7 Vancomycin 1000mg @0930       - 12/9 Vancomycin 1000mg @1310          12/10 Vancomycin random level - 20.3 mcg/ml @0502  Vancomycin level drawn 15 hours following last dose    Will start Vancomycin 750mg q24h  Family wishes to continue txt  - per ID note, will extend duration of vancomycin and meropenem to 12/15, MD to evaluate and adjust duration as needed    Monitor s/s nephrotoxicity: advanced age, acute on chronic renal failure, with wt - 56.1kg SCr may not be reflective of CrCl.  Pharmacy will follow closely and dose adjust as needed.     Thanks,  Dilan Foster [facility]  12/10/2017  8:14 AM

## 2017-12-10 NOTE — PLAN OF CARE
Problem: Patient Care Overview (Adult)  Goal: Plan of Care Review  Outcome: Ongoing (interventions implemented as appropriate)    12/10/17 0401   Coping/Psychosocial Response Interventions   Plan Of Care Reviewed With patient   Patient Care Overview   Progress progress toward functional goals is gradual   Outcome Evaluation   Outcome Summary/Follow up Plan Vitals signs stable, patient assisting in her care, will continue to monitor        Goal: Adult Individualization and Mutuality  Outcome: Ongoing (interventions implemented as appropriate)  Goal: Discharge Needs Assessment  Outcome: Ongoing (interventions implemented as appropriate)    Problem: Fall Risk (Adult)  Goal: Identify Related Risk Factors and Signs and Symptoms  Outcome: Ongoing (interventions implemented as appropriate)  Goal: Absence of Falls  Outcome: Ongoing (interventions implemented as appropriate)

## 2017-12-10 NOTE — PROGRESS NOTES
"Pharmacy Consult  -  Warfarin    Katie Crocker is a 89 y.o. female   Height - 160 cm (63\")  Weight - 56.1 kg (123 lb 9.6 oz)    Consulting Provider: - MOODY Ruelas  Indication: - afib  Goal INR: - 2-3  Home Regimen:   - 2 mg on fridays   - 3 mg all other days    Drug-Drug Interactions with current regimen:       Citalopram 20 mg po daily (increase risk of bleeding)       Mirtazapine 7.5 mg po daily (increase risk of bleeding)       Divalproex 500 mg po daily (increase risk of bleeding)       Warfarin Dosing During Admission:    Date  12/1 12/2 12/3 12/4 12/05 12/06 12/07 12/08 12/9   INR  aptt too high to measure inr -   Vitamin K 5mg 1.29 1.26 1.93 3.03 3.75 3.86 3.94 3.33   Dose  ----- 1 mg 1 mg 0.5 mg   hold hold hold hold 0.5mg     Date 12/10           INR 2.73           Dose 0.5mg                Labs:    Results from last 7 days   Lab Units 12/10/17  0502 12/09/17 0449 12/08/17 0440 12/07/17  0431 12/06/17  0501 12/05/17 0449 12/04/17  0505   INR  2.73 3.33 3.94 3.86 3.75 3.03 1.93   HEMOGLOBIN g/dL --  9.7* 9.4* 9.4* --  10.2* 9.3*   HEMATOCRIT % --  30.7* 29.8* 29.9* --  31.4* 29.6*   PLATELETS 10*3/mm3 --  321 336 312 --  275 307     Results from last 7 days   Lab Units 12/09/17 0449 12/08/17 0440 12/07/17 2007 12/07/17  0431   SODIUM mmol/L 139 140 --  139   POTASSIUM mmol/L 4.4 3.9 4.7 3.3*   CHLORIDE mmol/L 107 110* --  108   CO2 mmol/L 22.0 23.0 --  24.0   BUN mg/dL 9 11 --  14   CREATININE mg/dL 0.70 0.80 --  0.90   CALCIUM mg/dL 8.4* 8.6* --  8.5*   GLUCOSE mg/dL 68* 84 --  87         Current dietary intake: 0-25% oral intake. Nurse's notes indicate patient is not eating.   Diet Order   Procedures   • Diet Soft Texture; Ground; Thin     Patient Education: nursing home resident    Assessment/Plan:     12/9 INR - 2.73, decreased from 3.33  Patient was given Vitamin K 5mg IV x 1, 12/1 @ 1742. (effect diminished at this point)    -low dose Warfarin resumed 12/02 x3 doses, then held " for 4 days due to supratherapeutic INR    Will continue warfarin 0.5mg daily  Continue to monitor for s/sx of bleeding, drug-drug interactions, and dietary intake.   Pharmacy will follow closely and dose adjust accordingly.     Thanks,  Dilan Foster DEBORAH  12/10/2017  9:46 AM

## 2017-12-11 LAB
ANION GAP SERPL CALCULATED.3IONS-SCNC: 7 MMOL/L (ref 3–11)
BUN BLD-MCNC: 9 MG/DL (ref 9–23)
BUN/CREAT SERPL: 12.9 (ref 7–25)
CALCIUM SPEC-SCNC: 8.4 MG/DL (ref 8.7–10.4)
CHLORIDE SERPL-SCNC: 101 MMOL/L (ref 99–109)
CO2 SERPL-SCNC: 29 MMOL/L (ref 20–31)
CREAT BLD-MCNC: 0.7 MG/DL (ref 0.6–1.3)
DEPRECATED RDW RBC AUTO: 60.5 FL (ref 37–54)
ERYTHROCYTE [DISTWIDTH] IN BLOOD BY AUTOMATED COUNT: 17.6 % (ref 11.3–14.5)
GFR SERPL CREATININE-BSD FRML MDRD: 78 ML/MIN/1.73
GLUCOSE BLD-MCNC: 80 MG/DL (ref 70–100)
HCT VFR BLD AUTO: 33.2 % (ref 34.5–44)
HGB BLD-MCNC: 10.4 G/DL (ref 11.5–15.5)
INR PPP: 2.91
MCH RBC QN AUTO: 30.2 PG (ref 27–31)
MCHC RBC AUTO-ENTMCNC: 31.3 G/DL (ref 32–36)
MCV RBC AUTO: 96.5 FL (ref 80–99)
PLATELET # BLD AUTO: 324 10*3/MM3 (ref 150–450)
PMV BLD AUTO: 9.6 FL (ref 6–12)
POTASSIUM BLD-SCNC: 3.8 MMOL/L (ref 3.5–5.5)
PROTHROMBIN TIME: 32.8 SECONDS (ref 9.6–11.5)
RBC # BLD AUTO: 3.44 10*6/MM3 (ref 3.89–5.14)
SODIUM BLD-SCNC: 137 MMOL/L (ref 132–146)
VANCOMYCIN TROUGH SERPL-MCNC: 25.4 MCG/ML (ref 10–20)
WBC NRBC COR # BLD: 15.49 10*3/MM3 (ref 3.5–10.8)

## 2017-12-11 PROCEDURE — 80202 ASSAY OF VANCOMYCIN: CPT | Performed by: INTERNAL MEDICINE

## 2017-12-11 PROCEDURE — 85610 PROTHROMBIN TIME: CPT | Performed by: NURSE PRACTITIONER

## 2017-12-11 PROCEDURE — 25010000002 MEROPENEM

## 2017-12-11 PROCEDURE — 99232 SBSQ HOSP IP/OBS MODERATE 35: CPT | Performed by: INTERNAL MEDICINE

## 2017-12-11 PROCEDURE — 80048 BASIC METABOLIC PNL TOTAL CA: CPT | Performed by: INTERNAL MEDICINE

## 2017-12-11 PROCEDURE — 85027 COMPLETE CBC AUTOMATED: CPT | Performed by: INTERNAL MEDICINE

## 2017-12-11 RX ADMIN — ALLOPURINOL 150 MG: 300 TABLET ORAL at 09:02

## 2017-12-11 RX ADMIN — CITALOPRAM HYDROBROMIDE 20 MG: 20 TABLET ORAL at 09:02

## 2017-12-11 RX ADMIN — METOPROLOL TARTRATE 25 MG: 25 TABLET ORAL at 09:02

## 2017-12-11 RX ADMIN — ATORVASTATIN CALCIUM 10 MG: 10 TABLET, FILM COATED ORAL at 09:02

## 2017-12-11 RX ADMIN — NYSTATIN: 100000 POWDER TOPICAL at 21:01

## 2017-12-11 RX ADMIN — METOPROLOL TARTRATE 25 MG: 25 TABLET ORAL at 20:57

## 2017-12-11 RX ADMIN — DIVALPROEX SODIUM 500 MG: 500 TABLET, DELAYED RELEASE ORAL at 09:01

## 2017-12-11 RX ADMIN — MEROPENEM 1 G: 1 INJECTION, POWDER, FOR SOLUTION INTRAVENOUS at 12:30

## 2017-12-11 RX ADMIN — NYSTATIN: 100000 POWDER TOPICAL at 09:02

## 2017-12-11 RX ADMIN — Medication 1 CAPSULE: at 09:02

## 2017-12-11 RX ADMIN — MIRTAZAPINE 7.5 MG: 15 TABLET, FILM COATED ORAL at 20:57

## 2017-12-11 RX ADMIN — LEVOTHYROXINE SODIUM 50 MCG: 50 TABLET ORAL at 09:02

## 2017-12-11 RX ADMIN — DIGOXIN 125 MCG: 125 TABLET ORAL at 12:30

## 2017-12-11 NOTE — PROGRESS NOTES
Adult Nutrition  Assessment/PES    Patient Name:  Katie Crocker  YOB: 1923  MRN: [id number]  Admit Date:  12/1/2017    Assessment Date:  12/11/2017    Comments:            Reason for Assessment       12/11/17 1038    Reason for Assessment    Reason For Assessment/Visit follow up protocol    Time Spent (min) 30    Diagnosis --   SEE PREVIOUS NUTRITION NOTES    Pulmonary/Critical Care Pulmonary hypertension              Nutrition/Diet History       12/11/17 1039    Nutrition/Diet History    Functional Status/Food Prep Mobility --   DAUGHTER REPORTS PT STILL NOT EATING WELL EVEN THOUGH REQUESTED FOOD BEING SENT ON TRAYS. SHE REPORTS PT IS DRINKING OJ WELL. OBSERVED PT DRANK SOME BOOST PLUS OVER ICE AFTER I PREPARED IT FOR HER. NURSING REPORTS WILL ENCOURAGE PT TO DRINK BOOST PLUS     Food Allergies/Intolerances --   EACH TIME THEY GO INTO THE ROOM.    Reported/Observed By Patient;Nursing Assistant   DAUGHTER              Labs/Tests/Procedures/Meds       12/11/17 1044    Labs/Tests/Procedures/Meds    Labs/Tests Review Reviewed    Medication Review Reviewed, pertinent            Physical Findings       12/11/17 1044    Physical Appearance    Gastrointestinal other (see comments)   WDL PER NURSING DOCUMENTATION    Skin other (see comments)   WDL PER NURSING DOCUMENTATION              Nutrition Prescription Ordered       12/11/17 1045    Nutrition Prescription PO    Current PO Diet Soft Texture    Texture Ground    Fluid Consistency Thin    Supplement Boost Plus;Magic Cup    Supplement Frequency 3 times a day            Evaluation of Received Nutrient/Fluid Intake       12/11/17 1045    PO Evaluation    Number of Meals 3    % PO Intake --   0- BITES PER NURSING DOCUMENTATION            Problem/Interventions:        Problem 1       12/11/17 1045    Nutrition Diagnoses Problem 1    Problem 1 Inadequate Nutrient Intake    Etiology (related to) Other (comment)   CLINICAL CONDITION    Signs/Symptoms  (evidenced by) Report of Mnimal PO Intake;PO Intake    Percent (%) intake recorded --   0%-BITES    Over number of meals 3                    Intervention Goal       12/11/17 1046    Intervention Goal    General Nutrition support treatment    PO Increase intake            Nutrition Intervention       12/11/17 1046    Nutrition Intervention    RD/Tech Action Advise alternate selection;Advise available snack;Interview for preference;Encourage intake;Follow Tx progress;Care plan reviewd;Other (comment)   INSTRUCTED NURSING STAFF TO OFFER BOOST PLUS EACH TIME ARE IN ROOM. INSTRUCTED DAUGHTER TO OFFER SIPS IF BOOST PLUS INTERMITTENTLY THROUGHOUT THE DAY/              Education/Evaluation       12/11/17 1049    Monitor/Evaluation    Monitor Per protocol;I&O;PO intake;Supplement intake;Pertinent labs;Skin status;Symptoms        Electronically signed by:  Janice Pleitez RD  12/11/17 10:49 AM

## 2017-12-11 NOTE — PROGRESS NOTES
[facility] Medicine Services  PROGRESS NOTE    Patient Name: Katie Crocker  : 3/1/1923  MRN: [id number]    Date of Admission: 2017  Length of Stay: 10  Primary Care Physician: Jose Armando Sharif MD    Subjective   Subjective     CC:  Sepsis (present on admission)    Subjective:    No pain, fatigue. No fever. Eating few bites for meals    Review of Systems    Otherwise ROS is negative except as mentioned above.  No f/c, no headache, no vision change, no abd pain, no n/v/d    Objective   Objective     Vital Signs:   Temp:  [97.9 °F (36.6 °C)-98.3 °F (36.8 °C)] 98.3 °F (36.8 °C)  Heart Rate:  [] 76  Resp:  [18-38] 38  BP: (127-140)/(56-68) 127/68        Physical Exam:  Mildly drowsy, awakens to questions, frail but nontoxic appearing  Ncat, oroph clear  rrr  Faint bilateral rhonchi, no overt wheezes, normal respiratory effort  abd soft, nontender  Trace LE edema bilaterally  No extremity rash    Results Reviewed:  I have personally reviewed current lab, radiology, and data and agree.      Results from last 7 days  Lab Units 17  0509 12/10/17  [id number]   WBC 10*3/mm3 15.49*  --  13.77* 14.84*   HEMOGLOBIN g/dL 10.4*  --  9.7* 9.4*   HEMATOCRIT % 33.2*  --  30.7* 29.8*   PLATELETS 10*3/mm3 324  --  321 336   INR  2.91 2.73 3.33 3.94       Results from last 7 days  Lab Units 17  05017  04417  0440   SODIUM mmol/L 137 139 140   POTASSIUM mmol/L 3.8 4.4 3.9   CHLORIDE mmol/L 101 107 110*   CO2 mmol/L 29.0 22.0 23.0   BUN mg/dL 9 9 11   CREATININE mg/dL 0.70 0.70 0.80   GLUCOSE mg/dL 80 68* 84   CALCIUM mg/dL 8.4* 8.4* 8.6*     No results found for: BNP  No results found for: PHART    Microbiology Results Abnormal     Procedure Component Value - Date/Time    Blood Culture - Blood, [[id number]]  (Normal) Collected:  17 1420    Lab Status:  Final result Specimen:  Blood from Arm, Left Updated:  12/10/17 1501     Blood  Culture No growth at 5 days    Blood Culture - Blood, [[id number]]  (Normal) Collected:  12/05/17 1415    Lab Status:  Final result Specimen:  Blood from Arm, Left Updated:  12/10/17 1501     Blood Culture No growth at 5 days    Urine Culture - Urine, Urine, Clean Catch [[id number]]  (Abnormal) Collected:  12/05/17 2009    Lab Status:  Final result Specimen:  Urine from Urine, Clean Catch Updated:  12/07/17 1146     Urine Culture --      20,000-30,000 CFU/mL Yeast isolated (A)    Blood Culture - Blood, [[id number]]  (Normal) Collected:  12/01/17 1148    Lab Status:  Final result Specimen:  Blood from Arm, Left Updated:  12/06/17 1231     Blood Culture No growth at 5 days    Blood Culture - Blood, [[id number]]  (Normal) Collected:  12/01/17 1140    Lab Status:  Final result Specimen:  Blood from Arm, Right Updated:  12/06/17 1231     Blood Culture No growth at 5 days    Urine Culture - Urine, Urine, Clean Catch [[id number]]  (Abnormal)  (Susceptibility) Collected:  12/01/17 1137    Lab Status:  Final result Specimen:  Urine from Urine, Catheter Updated:  12/03/17 1143     Urine Culture --      >100,000 CFU/mL Escherichia coli (A)    Susceptibility      Escherichia coli     MARCUS     Ampicillin >16 ug/ml Resistant     Ampicillin + Sulbactam >16/8 ug/ml Resistant     Aztreonam <=8 ug/ml Susceptible     Cefepime <=8 ug/ml Susceptible     Cefotaxime <=2 ug/ml Susceptible     Ceftriaxone <=8 ug/ml Susceptible     Cefuroxime sodium <=4 ug/ml Susceptible     Cephalothin 16 ug/ml Intermediate     Ertapenem <=1 ug/ml Susceptible     Gentamicin <=4 ug/ml Susceptible     Levofloxacin <=2 ug/ml Susceptible     Meropenem <=1 ug/ml Susceptible     Nitrofurantoin <=32 ug/ml Susceptible     Piperacillin + Tazobactam <=16 ug/ml Susceptible     Tetracycline 8 ug/ml Intermediate     Tobramycin <=4 ug/ml Susceptible     Trimethoprim + Sulfamethoxazole >2/38 ug/ml Resistant                    Eosinophil Smear - Urine, Urine, Clean Catch  [[id number]]  (Normal) Collected:  12/01/17 1702    Lab Status:  Final result Specimen:  Urine from Urine, Clean Catch Updated:  12/01/17 2137     Eosinophil Smear 0 % EOS/100 Cells     Narrative:       No eosinophil seen    Influenza A & B, RT PCR - Swab, Nasopharynx [[id number]]  (Normal) Collected:  12/01/17 1703    Lab Status:  Final result Specimen:  Swab from Nasopharynx Updated:  12/01/17 1820     Influenza A PCR Not Detected     Influenza B PCR Not Detected          Imaging Results (last 24 hours)     Procedure Component Value Units Date/Time    XR Chest 1 View [[id number]] Collected:  12/08/17 1825     Updated:  12/11/17 0818    Narrative:          EXAMINATION: XR CHEST 1 VW - 12/08/2017     INDICATION: R50.9-Fever, unspecified; J18.9-Pneumonia, unspecified  organism; E87.70-Fluid overload, unspecified; I50.9-Heart failure,  unspecified; N39.0-Urinary tract infection, site not specified;  R09.02-Hypoxemia; I48.2-Chronic atrial fibrillation.      COMPARISON: 12/04/2017.     FINDINGS: There is bilateral airspace disease relatively sparing the  left upper lobe. The heart is slightly large. There has been little  change since the previous examination of 12/04/2017.           Impression:       Bilateral airspace disease, not significantly changed since  12/04/2017.     DICTATED:     12/08/2017  EDITED:          12/08/2017           This report was finalized on 12/11/2017 8:16 AM by Dr. Enio Rodriguez MD.           Results for orders placed during the hospital encounter of 12/01/17   Adult Transthoracic Echo Complete W/ Cont if Necessary Per Protocol    Narrative · Left ventricular systolic function is normal. Estimated EF = 55%.  · Mild aortic valve regurgitation is present.  · Mild-to-moderate mitral valve regurgitation is present  · Moderate to severe tricuspid valve regurgitation is present.  · Calculated right ventricular systolic pressure from tricuspid   regurgitation is 55 mmHg.          I have reviewed the  medications.    Assessment/Plan   Assessment / Plan     Hospital Problem List     * (Principal)Sepsis    HCAP (healthcare-associated pneumonia)    Urinary tract infection    Hypertension    Hyperlipidemia    GERD (gastroesophageal reflux disease)    CHF (congestive heart failure)    Depression    Gout    Hypothyroidism    Hemiparesis affecting right side as late effect of stroke    Atrial fibrillation    Supratherapeutic INR    Acute on chronic kidney failure    Anemia             Brief Hospital Course to date:  Katie Crocker is a 89 y.o. female with past medical history significant for hypertension, hyperlipidemia, hypothyroidism, atrial fibrillation, history of CVA, chronic kidney disease.  The patient was admitted for altered mental status and she was found to have pneumonia and UTI at the emergency room.      Assessment & Plan:  ----------------------------------------------------  -Severe sepsis (poa)   -Acute complicated recurrent UTI, e.coli   -w/ prior history of MDR uti's  -Bibasilar Pneumonia, likely due to intermittent/silent aspiration vs HCAP  -Dysphagia (improved, now on mechanical soft, thins)   -failed initial evaluations, then passed FEES on 12/3/17 by SLP  -S/p KATIE (resolved)  -Hx Afib, with intermittent RVR this admission   -on digoxin and metoprolol as outpatient   -coumadin  -Hx Valvular heart disease   -12/1/17: EF 55%, mild AI; mild-mod MR-mod-severe TR; ervsp 55mmhg  -Pulm HTN (ervsp 55mmhg)  -Supratherapeutic inr  -Encephalopathy, metabolic  -Hx CVA  --------------------------------------------------------  Plan:  --------------------------------------------------------  -continue merrem & vanc (doxy d/c'd after 7 days rx per ID recs)  -daily inr (holding coumadin for supratherapeutic inr)  -bmp in a.m.    *long discusison with daughter at bedside today (as well asthe patient). Due to lack of clinical improvement over the weekend, minimal po intake, lack of mobility daughter and  "patient leaning towards stopping antibiotics and pursuing comfort only measures. However, the daughter at bedside needs to discuss w/ her sister (who is the poa) prior to making this definitive step. Appreciate palliative assistance in goals/limits, etc. Also during next meeting plan to discuss stopping coumadin/blood draws          DVT Prophylaxis:  Anticoagulated with warfarin    CODE STATUS: Conditional Code, DNR/DNI    Disposition: TBD. Overall poor prognosis     Tomi Jimenez MD  12/11/17  1:15 PM    Addendum at 16:50:    -discussed w/ daughter (who spoke with the other daughter as well as patient). They wish to stop antibiotics at this time, and return to nursing facility with hospice asap. Wish to continue coumadin for now, but they will \"think about\" stopping it, daughter would like to speak with her sister about it.  "

## 2017-12-11 NOTE — PROGRESS NOTES
"Pharmacy Consult  -  Warfarin    Katie Crocker is a 89 y.o. female   Height - 160 cm (63\")  Weight - 56.1 kg (123 lb 9.6 oz)    Consulting Provider: - MOODY Ruelas  Indication: - afib  Goal INR: - 2-3  Home Regimen:   - 2 mg on fridays   - 3 mg all other days    Drug-Drug Interactions with current regimen:       Citalopram 20 mg po daily (increase risk of bleeding)       Mirtazapine 7.5 mg po daily (increase risk of bleeding)       Divalproex 500 mg po daily (increase risk of bleeding)       Warfarin Dosing During Admission:    Date  12/1 12/2 12/3 12/4 12/05 12/06 12/07 12/08 12/9   INR  aptt too high to measure inr -   Vitamin K 5mg 1.29 1.26 1.93 3.03 3.75 3.86 3.94 3.33   Dose  ----- 1 mg 1 mg 0.5 mg   hold hold hold hold 0.5mg     Date 12/10 12/11          INR 2.73 2.91          Dose 0.5mg (hold)               Labs:    Results from last 7 days   Lab Units 12/10/17  0502 12/09/17 0449 12/08/17 0440 12/07/17 0431 12/06/17 0501 12/05/17 0449 12/04/17  0505   INR  2.73 3.33 3.94 3.86 3.75 3.03 1.93   HEMOGLOBIN g/dL --  9.7* 9.4* 9.4* --  10.2* 9.3*   HEMATOCRIT % --  30.7* 29.8* 29.9* --  31.4* 29.6*   PLATELETS 10*3/mm3 --  321 336 312 --  275 307     Results from last 7 days   Lab Units 12/09/17  0449 12/08/17 0440 12/07/17 2007 12/07/17  0431   SODIUM mmol/L 139 140 --  139   POTASSIUM mmol/L 4.4 3.9 4.7 3.3*   CHLORIDE mmol/L 107 110* --  108   CO2 mmol/L 22.0 23.0 --  24.0   BUN mg/dL 9 11 --  14   CREATININE mg/dL 0.70 0.80 --  0.90   CALCIUM mg/dL 8.4* 8.6* --  8.5*   GLUCOSE mg/dL 68* 84 --  87         Current dietary intake: 0-5% oral intake.   Diet Order   Procedures   • Diet Soft Texture; Ground; Thin     Patient Education: nursing home resident    Assessment/Plan:   On admission patient was given Vitamin K 5mg IV x 1 (12/1 @ 1742).   Warfarin resumed cautiously 12/02 at a reduced dose, but patient's INR was again supratherapeutic (12/5-12/09) despite minimal doses.   Today, " patient's INR is 2.91, after Warfarin 0.5 mg (x 2).   Given patient's sensitivity to warfarin, clinical status, and lack of oral intake Warfarin will be held today.   We anticipate a potential maintenance regimen of Warfarin 0.5 mg po every other day as long as PO intake stays extremely diminished.  Continue to monitor for s/sx of bleeding, drug-drug interactions, and dietary intake.   Pharmacy will follow closely and dose adjust accordingly.     Martina Renner, PharmD Candidate 2018  12/11/2017  8:38 AM    Carlota LyonsD  12/11/2017  8:43 AM

## 2017-12-11 NOTE — SIGNIFICANT NOTE
13:00   Palliative Team Member Meeting  Attendance:  Dr. Danny Chávez, MOODY Quick, MOODY Norris, RN, PN  AMBIKA Guidry LCSW

## 2017-12-11 NOTE — PLAN OF CARE
Problem: Patient Care Overview (Adult)  Goal: Plan of Care Review  Outcome: Ongoing (interventions implemented as appropriate)    12/11/17 0324   Coping/Psychosocial Response Interventions   Plan Of Care Reviewed With patient   Patient Care Overview   Progress progress towards functional goals is fair   Outcome Evaluation   Outcome Summary/Follow up Plan vss, no issues overnight, pt rested well, iv abx continues, will continue to monitor         Problem: Fall Risk (Adult)  Goal: Identify Related Risk Factors and Signs and Symptoms  Outcome: Ongoing (interventions implemented as appropriate)

## 2017-12-11 NOTE — PROGRESS NOTES
"Pharmacy Consult-Vancomycin Dosing  Katie Crocker is a  89 y.o. female receiving vancomycin therapy.     Indication: sepsis  Consulting Provider:  Dr. RITA Antonio (I.D.)  Goal Trough: 15~20    Current Antimicrobial Therapy    Vancomycin dosing per levels (day 7)  Merrem 1g IV q8h (day 7)    S/p: cefepime + flagyl (x 4 days), doxycycline (x 7 days)    Allergies  Allergies as of 12/01/2017 - Miguel as Reviewed 12/01/2017   Allergen Reaction Noted   • Lithium  12/01/2017       Labs  Temp Readings from Last 3 Encounters:   12/10/17 97.9 °F (36.6 °C) (Oral)     Results from last 7 days   Lab Units 12/11/17  0509 12/09/17  0449 12/08/17  0440   WBC 10*3/mm3 15.49* 13.77* 14.84*   HEMOGLOBIN g/dL 10.4* 9.7* 9.4*   HEMATOCRIT % 33.2* 30.7* 29.8*   PLATELETS 10*3/mm3 324 321 336    Results from last 7 days   Lab Units 12/11/17  0509 12/09/17  0449 12/08/17  0440   SODIUM mmol/L 137 139 140   POTASSIUM mmol/L 3.8 4.4 3.9   CHLORIDE mmol/L 101 107 110*   CO2 mmol/L 29.0 22.0 23.0   BUN mg/dL 9 9 11   CREATININE mg/dL 0.70 0.70 0.80   GLUCOSE mg/dL 80 68* 84   CALCIUM mg/dL 8.4* 8.4* 8.6*      Evaluation of Dosing     Ht - 160 cm (63\")  Wt - 56.1 kg (123 lb 9.6 oz)    Estimated Creatinine Clearance: 38.1 mL/min (by C-G formula based on Cr of 0.7).    Intake & Output (last 3 days)         12/08 0701 - 12/09 0700 12/09 0701 - 12/10 0700 12/10 0701 - 12/11 0700 12/11 0701 - 12/12 0700      P.O. 480 720 240     IV Piggyback 100  950     Total Intake(mL/kg) 580 (10.3) 720 (12.8) 1190 (21.2)     Urine (mL/kg/hr)        Stool        Total Output            Net +580 +720 +1190              Unmeasured Urine Occurrence 6 x 8 x 7 x     Unmeasured Stool Occurrence 4 x               Microbiology and Radiology    Specimen: Urine from Urine, Clean Catch Updated: 12/07/17 1146         Urine Culture --         20,000-30,000 CFU/mL Yeast isolated (A)      Specimen: Blood from Arm, Left Updated: 12/08/17 1501        Blood Culture No growth at 3 " days x2     Specimen Information: Urine, Catheter; Urine        Culture  12/1        >100,000 CFU/mL Escherichia coli (A)           Evaluation of Levels   Lab Results   Component Value Date    [facility] 25.40 (H) 12/11/2017   ~13 hour level     Assessment/Plan:    Patient currently receiving Vancomycin 750 mg IV q24h.   Vancomycin random this morning is 25.4 mcg/mL and reflects an ~13 hour level .   Patient will continue current regimen of 750 mg IV q24h as this level is NOT representative of 24 hour interval dosing.   Patient's renal function remains stable, accessed via SCr and UOP.   Duration of antibiotics has been extended to 12/15, due to families wishes.   Pharmacy will continue to follow closely for therapy duration and to determine when vancomycin trough reassessment is needed.        Thanks,  Martina Renner, Pharmacy Intern  12/11/2017  7:36 AM     Raine Steen, PharmD  12/11/2017  8:45 AM

## 2017-12-11 NOTE — PLAN OF CARE
Problem: Patient Care Overview (Adult)  Goal: Plan of Care Review  Outcome: Ongoing (interventions implemented as appropriate)    12/11/17 1821   Coping/Psychosocial Response Interventions   Plan Of Care Reviewed With patient   Patient Care Overview   Progress declining   Outcome Evaluation   Outcome Summary/Follow up Plan tired, wakes to eat a few bites and to take meds, sleeps otherwise       Goal: Adult Individualization and Mutuality  Outcome: Ongoing (interventions implemented as appropriate)    12/10/17 1635 12/11/17 1821   Individualization   Patient Specific Goals --  pt will remain comfortable   Patient Specific Interventions assess pt pain/comfort every 2 hours, administer pain meds as ordered, dim lighting, use pillows as needed --          Problem: Fall Risk (Adult)  Goal: Identify Related Risk Factors and Signs and Symptoms  Outcome: Ongoing (interventions implemented as appropriate)    12/11/17 0324   Fall Risk   Fall Risk: Related Risk Factors age-related changes;environment unfamiliar   Fall Risk: Signs and Symptoms presence of risk factors       Goal: Absence of Falls  Outcome: Ongoing (interventions implemented as appropriate)    12/11/17 1821   Fall Risk (Adult)   Absence of Falls making progress toward outcome

## 2017-12-11 NOTE — PLAN OF CARE
Problem: Patient Care Overview (Adult)  Goal: Plan of Care Review  Outcome: Ongoing (interventions implemented as appropriate)    12/11/17 1130   Coping/Psychosocial Response Interventions   Plan Of Care Reviewed With patient;daughter   Patient Care Overview   Progress no change   Outcome Evaluation   Outcome Summary/Follow up Plan Patient denies symptoms, tired, eating 25%, case management and hospice working on discharge plan. Palliative will continue to support

## 2017-12-11 NOTE — PROGRESS NOTES
Continued Stay Note  [facility]     Patient Name: Katie Crocker  MRN: [id number]  Today's Date: 12/11/2017    Admit Date: 12/1/2017          Discharge Plan       12/11/17 1646    Case Management/Social Work Plan    Plan Undetermined    Additional Comments Hospice consult received.  Chart reviewed.  Current resident of [facility].  Admitted wih bilateral lower lobe HCAP. Hypoxemia. Fever.  Resting quietly.  No obvious distress.  No family at bedside.  Spoke with primary RN who reports - sleeping much of the time.  Rouses to eat small amounts and to take meds.  No reports of distressing symptoms.  Plan to meet with pt/dtr 12/12/17 to discuss options for hospice services.  Hospice Liaison has seen and met with them in the past.  If can be of further assist please contact....ext [phone].              Discharge Codes     None        Expected Discharge Date and Time     Expected Discharge Date Expected Discharge Time    Dec 6, 2017             Caryl Anthony RN

## 2017-12-11 NOTE — PROGRESS NOTES
Continued Stay Note  [facility]     Patient Name: Katie Crocker  MRN: [id number]  Today's Date: 12/11/2017    Admit Date: 12/1/2017          Discharge Plan       12/11/17 1339    Case Management/Social Work Plan    Plan ONGOING    Patient/Family In Agreement With Plan yes    Additional Comments Noted MD discussion with pt/family today.  Hospice consult pending.  Pt can return to [facility] with Hospice services if that is to be considered.  CM will cont to follow from periphery until a dispo is determined.                Discharge Codes     None        Expected Discharge Date and Time     Expected Discharge Date Expected Discharge Time    Dec 6, 2017             Hermelinda Hall

## 2017-12-12 LAB
INR PPP: 1.5
PROTHROMBIN TIME: 16.6 SECONDS (ref 9.6–11.5)

## 2017-12-12 PROCEDURE — 85610 PROTHROMBIN TIME: CPT | Performed by: NURSE PRACTITIONER

## 2017-12-12 PROCEDURE — 99232 SBSQ HOSP IP/OBS MODERATE 35: CPT | Performed by: INTERNAL MEDICINE

## 2017-12-12 RX ORDER — WARFARIN SODIUM 1 MG/1
1 TABLET ORAL ONCE
Status: COMPLETED | OUTPATIENT
Start: 2017-12-12 | End: 2017-12-12

## 2017-12-12 RX ADMIN — ALLOPURINOL 150 MG: 300 TABLET ORAL at 08:44

## 2017-12-12 RX ADMIN — NYSTATIN: 100000 POWDER TOPICAL at 20:19

## 2017-12-12 RX ADMIN — METOPROLOL TARTRATE 25 MG: 25 TABLET ORAL at 08:44

## 2017-12-12 RX ADMIN — ATORVASTATIN CALCIUM 10 MG: 10 TABLET, FILM COATED ORAL at 08:43

## 2017-12-12 RX ADMIN — CITALOPRAM HYDROBROMIDE 20 MG: 20 TABLET ORAL at 08:44

## 2017-12-12 RX ADMIN — METOPROLOL TARTRATE 25 MG: 25 TABLET ORAL at 20:19

## 2017-12-12 RX ADMIN — DIGOXIN 125 MCG: 125 TABLET ORAL at 12:14

## 2017-12-12 RX ADMIN — LEVOTHYROXINE SODIUM 50 MCG: 50 TABLET ORAL at 08:44

## 2017-12-12 RX ADMIN — WARFARIN SODIUM 1 MG: 1 TABLET ORAL at 17:17

## 2017-12-12 RX ADMIN — Medication 1 CAPSULE: at 08:43

## 2017-12-12 RX ADMIN — MIRTAZAPINE 7.5 MG: 15 TABLET, FILM COATED ORAL at 20:19

## 2017-12-12 RX ADMIN — DIVALPROEX SODIUM 500 MG: 500 TABLET, DELAYED RELEASE ORAL at 08:44

## 2017-12-12 RX ADMIN — NYSTATIN: 100000 POWDER TOPICAL at 08:44

## 2017-12-12 NOTE — PROGRESS NOTES
"Pharmacy Consult  -  Warfarin    Katie Crocker is a 89 y.o. female   Height - 160 cm (63\")  Weight - 58 kg (127 lb 14.9 oz)    Consulting Provider: - MOODY Ruelas  Indication: - afib  Goal INR: - 2-3  Home Regimen:   - 2 mg on fridays   - 3 mg all other days    Drug-Drug Interactions with current regimen:       Citalopram 20 mg po daily (increase risk of bleeding)       Mirtazapine 7.5 mg po daily (increase risk of bleeding)       Divalproex 500 mg po daily (increase risk of bleeding)       Warfarin Dosing During Admission:    Date  12/1 12/2 12/3 12/4 12/05 12/06 12/07 12/08 12/9   INR  aptt too high to measure inr -   Vitamin K 5mg 1.29 1.26 1.93 3.03 3.75 3.86 3.94 3.33   Dose  ----- 1 mg 1 mg 0.5 mg   hold hold hold hold 0.5mg     Date 12/10 12/11 12/12         INR 2.73 2.91 1.50         Dose 0.5mg hold (1 mg)              Labs:    Results from last 7 days   Lab Units 12/12/17  0845 12/11/17  0509 12/10/17  0502 12/09/17  0449 12/08/17  0440 12/07/17  0431 12/06/17  0501   INR  1.50 2.91 2.73 3.33 3.94 3.86 3.75   HEMOGLOBIN g/dL --  10.4* --  9.7* 9.4* 9.4* --    HEMATOCRIT % --  33.2* --  30.7* 29.8* 29.9* --    PLATELETS 10*3/mm3 --  324 --  321 336 312 --      Results from last 7 days   Lab Units 12/11/17  0509 12/09/17  0449 12/08/17  0440   SODIUM mmol/L 137 139 140   POTASSIUM mmol/L 3.8 4.4 3.9   CHLORIDE mmol/L 101 107 110*   CO2 mmol/L 29.0 22.0 23.0   BUN mg/dL 9 9 11   CREATININE mg/dL 0.70 0.70 0.80   CALCIUM mg/dL 8.4* 8.4* 8.6*   GLUCOSE mg/dL 80 68* 84         Current dietary intake: 25% lunch (12/11)- no other meals.   Diet Order   Procedures   • Diet Soft Texture; Ground; Thin     Patient Education: nursing home resident    Assessment/Plan:   On admission patient was given Vitamin K 5mg IV x 1 (12/1 @ 9442).   Warfarin resumed cautiously 12/02 at a reduced dose.  Today, patient's INR is subtherapeutic at 1.50. Likely reflective of the doses held 12/04 through 12/08.  Given " patient's sensitivity to warfarin, clinical status, and lack of oral intake we will cautiously give Warfarin 1mg today.   Continue to monitor for s/sx of bleeding/clotting, drug-drug interactions, and dietary intake.   Pharmacy will follow closely and dose adjust accordingly.     Martina Renenr, PharmD Candidate 2018    Vidal Hooper, PharmD  Pharmacy Resident  12/12/2017  2:00 PM

## 2017-12-12 NOTE — PROGRESS NOTES
Events noted.  Antibiotics stopped and probable discharge with Hospice soon, which I agree with.  I will sign off.

## 2017-12-12 NOTE — PROGRESS NOTES
[facility] Medicine Services  PROGRESS NOTE    Patient Name: Katie Crocker  : 3/1/1923  MRN: [id number]    Date of Admission: 2017  Length of Stay: 11  Primary Care Physician: Jose Armando Sharif MD    Subjective   Subjective     CC:  Sepsis (present on admission)    Subjective:    Denies pain. Fatigue noted. Anorexia but no nausea    Review of Systems    Otherwise ROS is negative except as mentioned above.  No f/c, no headache, no vision change, no abd pain, no n/v/d    Objective   Objective     Vital Signs:   Temp:  [97.6 °F (36.4 °C)-98.1 °F (36.7 °C)] 97.6 °F (36.4 °C)  Heart Rate:  [56-83] 83  Resp:  [20-24] 20  BP: (128-148)/(49-77) 148/77        Physical Exam:  Mildly drowsy, awakens to questions, frail but nontoxic appearing  [place], oroph clear  rrr  Faint bilateral rhonchi, no overt wheezes, normal respiratory effort  abd soft, nontender  Trace LE edema bilaterally  No extremity rash    Results Reviewed:  I have personally reviewed current lab, radiology, and data and agree.      Results from last 7 days  Lab Units 17  0845 17  0509 12/10/17  [id number]   WBC 10*3/mm3  --  15.49*  --  13.77* 14.84*   HEMOGLOBIN g/dL  --  10.4*  --  9.7* 9.4*   HEMATOCRIT %  --  33.2*  --  30.7* 29.8*   PLATELETS 10*3/mm3  --  324  --  321 336   INR  1.50 2.91 2.73 3.33 3.94       Results from last 7 days  Lab Units 17  0509 170   SODIUM mmol/L 137 139 140   POTASSIUM mmol/L 3.8 4.4 3.9   CHLORIDE mmol/L 101 107 110*   CO2 mmol/L 29.0 22.0 23.0   BUN mg/dL 9 9 11   CREATININE mg/dL 0.70 0.70 0.80   GLUCOSE mg/dL 80 68* 84   CALCIUM mg/dL 8.4* 8.4* 8.6*     No results found for: BNP  No results found for: PHART    Microbiology Results Abnormal     Procedure Component Value - Date/Time    Blood Culture - Blood, [[id number]]  (Normal) Collected:  17 1420    Lab Status:  Final result Specimen:  Blood from Arm, Left  Updated:  12/10/17 1501     Blood Culture No growth at 5 days    Blood Culture - Blood, [[id number]]  (Normal) Collected:  12/05/17 1415    Lab Status:  Final result Specimen:  Blood from Arm, Left Updated:  12/10/17 1501     Blood Culture No growth at 5 days    Urine Culture - Urine, Urine, Clean Catch [[id number]]  (Abnormal) Collected:  12/05/17 2009    Lab Status:  Final result Specimen:  Urine from Urine, Clean Catch Updated:  12/07/17 1146     Urine Culture --      20,000-30,000 CFU/mL Yeast isolated (A)    Blood Culture - Blood, [[id number]]  (Normal) Collected:  12/01/17 1148    Lab Status:  Final result Specimen:  Blood from Arm, Left Updated:  12/06/17 1231     Blood Culture No growth at 5 days    Blood Culture - Blood, [[id number]]  (Normal) Collected:  12/01/17 1140    Lab Status:  Final result Specimen:  Blood from Arm, Right Updated:  12/06/17 1231     Blood Culture No growth at 5 days    Urine Culture - Urine, Urine, Clean Catch [[id number]]  (Abnormal)  (Susceptibility) Collected:  12/01/17 1137    Lab Status:  Final result Specimen:  Urine from Urine, Catheter Updated:  12/03/17 1143     Urine Culture --      >100,000 CFU/mL Escherichia coli (A)    Susceptibility      Escherichia coli     MARCUS     Ampicillin >16 ug/ml Resistant     Ampicillin + Sulbactam >16/8 ug/ml Resistant     Aztreonam <=8 ug/ml Susceptible     Cefepime <=8 ug/ml Susceptible     Cefotaxime <=2 ug/ml Susceptible     Ceftriaxone <=8 ug/ml Susceptible     Cefuroxime sodium <=4 ug/ml Susceptible     Cephalothin 16 ug/ml Intermediate     Ertapenem <=1 ug/ml Susceptible     Gentamicin <=4 ug/ml Susceptible     Levofloxacin <=2 ug/ml Susceptible     Meropenem <=1 ug/ml Susceptible     Nitrofurantoin <=32 ug/ml Susceptible     Piperacillin + Tazobactam <=16 ug/ml Susceptible     Tetracycline 8 ug/ml Intermediate     Tobramycin <=4 ug/ml Susceptible     Trimethoprim + Sulfamethoxazole >2/38 ug/ml Resistant                    Eosinophil Smear -  Urine, Urine, Clean Catch [[id number]]  (Normal) Collected:  12/01/17 1702    Lab Status:  Final result Specimen:  Urine from Urine, Clean Catch Updated:  12/01/17 2137     Eosinophil Smear 0 % EOS/100 Cells     Narrative:       No eosinophil seen    Influenza A & B, RT PCR - Swab, Nasopharynx [[id number]]  (Normal) Collected:  12/01/17 1703    Lab Status:  Final result Specimen:  Swab from Nasopharynx Updated:  12/01/17 1820     Influenza A PCR Not Detected     Influenza B PCR Not Detected          Imaging Results (last 24 hours)     ** No results found for the last 24 hours. **        Results for orders placed during the hospital encounter of 12/01/17   Adult Transthoracic Echo Complete W/ Cont if Necessary Per Protocol    Narrative · Left ventricular systolic function is normal. Estimated EF = 55%.  · Mild aortic valve regurgitation is present.  · Mild-to-moderate mitral valve regurgitation is present  · Moderate to severe tricuspid valve regurgitation is present.  · Calculated right ventricular systolic pressure from tricuspid   regurgitation is 55 mmHg.          I have reviewed the medications.    Assessment/Plan   Assessment / Plan     Hospital Problem List     * (Principal)Sepsis    HCAP (healthcare-associated pneumonia)    Urinary tract infection    Hypertension    Hyperlipidemia    GERD (gastroesophageal reflux disease)    CHF (congestive heart failure)    Depression    Gout    Hypothyroidism    Hemiparesis affecting right side as late effect of stroke    Atrial fibrillation    Supratherapeutic INR    Acute on chronic kidney failure    Anemia             Brief Hospital Course to date:  Katie Crocker is a 89 y.o. female with past medical history significant for hypertension, hyperlipidemia, hypothyroidism, atrial fibrillation, history of CVA, chronic kidney disease.  The patient was admitted for altered mental status and she was found to have pneumonia and UTI at the emergency room.      Assessment &  Plan:  ----------------------------------------------------  -Severe sepsis (poa)   -Acute complicated recurrent UTI, e.coli   -w/ prior history of MDR uti's  -Bibasilar Pneumonia, likely due to intermittent/silent aspiration vs HCAP  -Dysphagia (improved, now on mechanical soft, thins)   -failed initial evaluations, then passed FEES on 12/3/17 by SLP  -S/p KATIE (resolved)  -Hx Afib, with intermittent RVR this admission   -on digoxin and metoprolol as outpatient   -coumadin  -Hx Valvular heart disease   -12/1/17: EF 55%, mild AI; mild-mod MR-mod-severe TR; ervsp 55mmhg  -Pulm HTN (ervsp 55mmhg)  -Supratherapeutic inr  -Encephalopathy, metabolic  -Hx CVA  -DNR/comfort care  --------------------------------------------------------  Plan:  --------------------------------------------------------  -antibiotics stopped yesterday  -comfort measures/dnr/dni  -family and patient now wishing to pursue comfort only measures  -hospice following, to discuss next step with family (? snf vs home w/ hospice)  -during my 12/11/17 discussion with patient's daughter, she wished to continue coumadin until she could discuss with her sister (I had recommended stopping coumadin to minimize complications, blood draws, etc). Prior to discharge this issue should be re-addressed        DVT Prophylaxis:  Anticoagulated with warfarin    CODE STATUS: Comfort Measures and Allow Natural Death (A-N-D), DNR/DNI    Disposition: TBD. Overall poor prognosis     Tomi Jimenez MD  12/12/17  3:52 PM

## 2017-12-12 NOTE — PLAN OF CARE
Problem: Patient Care Overview (Adult)  Goal: Plan of Care Review  Outcome: Ongoing (interventions implemented as appropriate)    12/12/17 1017   Coping/Psychosocial Response Interventions   Plan Of Care Reviewed With patient;caregiver   Patient Care Overview   Progress declining   Outcome Evaluation   Outcome Summary/Follow up Plan Patient sleeping more, eating bites and sips, Hospice to see about end of life care.

## 2017-12-12 NOTE — PROGRESS NOTES
"Continued Stay Note  [facility]     Patient Name: Katie Crocker  MRN: [id number]  Today's Date: 12/12/2017    Admit Date: 12/1/2017          Discharge Plan       12/12/17 1730    Case Management/Social Work Plan    Plan Undetermined    Additional Comments Chart reviewed.  Awake.  Alert.  Appropriately responsive.  Stated dtr, Alma, \"is at work\".  Weak.  Limited po intake.  No family at bedside.  Requested primary RN contact Hospice Liaison if dtr visited.  Attempted to reach dtr by phone without success.  Plan to make contact with dtrAlma, 12/13/17.  Update to RN Case Manager.  If can be of further assist please contact.....ext [phone]              Discharge Codes     None        Expected Discharge Date and Time     Expected Discharge Date Expected Discharge Time    Dec 6, 2017             Caryl Anthony RN    "

## 2017-12-12 NOTE — PLAN OF CARE
Problem: Patient Care Overview (Adult)  Goal: Plan of Care Review  Outcome: Ongoing (interventions implemented as appropriate)    12/12/17 0245   Coping/Psychosocial Response Interventions   Plan Of Care Reviewed With patient   Patient Care Overview   Progress progress towards functional goals is fair   Outcome Evaluation   Outcome Summary/Follow up Plan vss, iv abx stopped, possible dc back to [facility] in am with hospice, will monitor         Problem: Fall Risk (Adult)  Goal: Identify Related Risk Factors and Signs and Symptoms  Outcome: Ongoing (interventions implemented as appropriate)

## 2017-12-13 LAB
INR PPP: 1.4
PROTHROMBIN TIME: 15.4 SECONDS (ref 9.6–11.5)

## 2017-12-13 PROCEDURE — 85610 PROTHROMBIN TIME: CPT | Performed by: NURSE PRACTITIONER

## 2017-12-13 PROCEDURE — 99232 SBSQ HOSP IP/OBS MODERATE 35: CPT | Performed by: HOSPITALIST

## 2017-12-13 RX ORDER — WARFARIN SODIUM 1 MG/1
1 TABLET ORAL
Status: DISCONTINUED | OUTPATIENT
Start: 2017-12-13 | End: 2017-12-14 | Stop reason: HOSPADM

## 2017-12-13 RX ADMIN — NYSTATIN: 100000 POWDER TOPICAL at 22:00

## 2017-12-13 RX ADMIN — ALLOPURINOL 150 MG: 300 TABLET ORAL at 08:23

## 2017-12-13 RX ADMIN — NYSTATIN: 100000 POWDER TOPICAL at 08:23

## 2017-12-13 RX ADMIN — LEVOTHYROXINE SODIUM 50 MCG: 50 TABLET ORAL at 08:23

## 2017-12-13 RX ADMIN — ATORVASTATIN CALCIUM 10 MG: 10 TABLET, FILM COATED ORAL at 08:22

## 2017-12-13 RX ADMIN — Medication 1 CAPSULE: at 08:22

## 2017-12-13 RX ADMIN — DIGOXIN 125 MCG: 125 TABLET ORAL at 11:30

## 2017-12-13 RX ADMIN — CITALOPRAM HYDROBROMIDE 20 MG: 20 TABLET ORAL at 08:22

## 2017-12-13 RX ADMIN — METOPROLOL TARTRATE 25 MG: 25 TABLET ORAL at 08:22

## 2017-12-13 RX ADMIN — WARFARIN SODIUM 1 MG: 1 TABLET ORAL at 17:36

## 2017-12-13 RX ADMIN — DIVALPROEX SODIUM 500 MG: 500 TABLET, DELAYED RELEASE ORAL at 08:23

## 2017-12-13 RX ADMIN — METOPROLOL TARTRATE 25 MG: 25 TABLET ORAL at 22:00

## 2017-12-13 NOTE — PLAN OF CARE
Problem: Patient Care Overview (Adult)  Goal: Plan of Care Review  Outcome: Ongoing (interventions implemented as appropriate)  Goal: Adult Individualization and Mutuality  Outcome: Ongoing (interventions implemented as appropriate)  Goal: Discharge Needs Assessment  Outcome: Ongoing (interventions implemented as appropriate)    Problem: Fall Risk (Adult)  Goal: Absence of Falls  Outcome: Ongoing (interventions implemented as appropriate)

## 2017-12-13 NOTE — PROGRESS NOTES
Continued Stay Note  [facility]     Patient Name: Katie Crocker  MRN: [id number]  Today's Date: 12/13/2017    Admit Date: 12/1/2017          Discharge Plan       12/13/17 1740    Case Management/Social Work Plan    Plan Transfer back to [facility] with [facility] team    Patient/Family In Agreement With Plan yes    Additional Comments Chart reviewed.  Resting and tachypneic without obvious distress.  Primary RN reports increased tachypnea when changing pt this AM.  This PM no further issue with tachypnea.  Spoke with Radha muniz, re: hospice services and goals of care.  Discussed inpatient hospice vs hospice at [facility].  Dtrs' preference was inpatient hospice.  When Inpatient Hospice Nurse, Meena Orellana, discussed with pt, she clearly expressed her desire to return to [facility].  Advised Radha muniz, who stated pt can make that decision.  Spoke with Elida Dugan, [facility] Admissions, willing to accept pt in return 12/14/17 with [facility] team.  Medical records faxed to [facility] Central Intake and update to Admission Specialist, Cristina Hay.  Ambulance transport per [facility] 1300 12/14/17.  Primary RN to call report to [facility] [address] - [phone] and fax transfer summary to [phone].  Update to primary RN, RN Case Manager, Palliative Care team and to Radha muniz.  Will follow for hospice support.  If can be of further assist please contact.....ext [phone].              Discharge Codes     None        Expected Discharge Date and Time     Expected Discharge Date Expected Discharge Time    Dec 14, 2017             Caryl Anthony RN

## 2017-12-13 NOTE — SIGNIFICANT NOTE
13:00   Palliative Team Member Meeting  Attendance:  Dr. Danny Quick, MOODY Norris, RN, CHPN  Digna York, RN  [facility]  Caryl Anthony, Hospice/

## 2017-12-13 NOTE — PLAN OF CARE
Problem: Patient Care Overview (Adult)  Goal: Plan of Care Review  Outcome: Ongoing (interventions implemented as appropriate)    12/13/17 1015   Coping/Psychosocial Response Interventions   Plan Of Care Reviewed With patient;caregiver   Patient Care Overview   Progress no change   Outcome Evaluation   Outcome Summary/Follow up Plan Patient intake has decrease, decrease responsiness, daughter interested in admitting to inpatient Hospice and focus on her comfort

## 2017-12-13 NOTE — DISCHARGE PLACEMENT REQUEST
"Katie Crocker (89 y.o. Female)     Date of Birth Social Security Number Address Home Phone MRN    03/01/1923  [address] [place] [id number] [phone] [id number]    Roman [employer] Marital Status          Mu-ism        Admission Date Admission Type Admitting Provider Attending Provider Department, Room/Bed    12/1/17 Emergency Mahad Chandler MD Dossett, Lee M, MD [facility] 4G, S457/1    Discharge Date Discharge Disposition Discharge Destination                      Attending Provider: Mahad Chandler MD     Allergies:  Lithium    Isolation:  None   Infection:  None   Code Status:  Comfort Analisa    Ht:  160 cm (63\")   Wt:  58 kg (127 lb 14.2 oz)    Admission Cmt:  None   Principal Problem:  Sepsis [A41.9]                 Active Insurance as of 12/1/2017     Primary Coverage     Payor Plan Insurance Group Employer/Plan Group    [place]      Payor Plan Address Payor Plan Phone Number Effective From Effective To    [address] [phone] 12/1/2017     [place] [id number]       Subscriber Name Subscriber Birth Date Member ID       KATIE CROCKER 3/1/1923 [id number]                 Emergency Contacts      (Rel.) Home Phone Work Phone Mobile Phone    Alma Crocker (Power of ) -- -- [phone]    Radha Mike (Power of ) -- -- [phone]    Nargis Beckwith -- -- --            Emergency Contact Information     Name Relation Home Work Mobile    Alma Crocker Power of    [phone]    Radha Mike Power of    [phone]    Nargis Beckwith              Insurance Information                [place] Phone: [phone]    Subscriber: Jude Crockerabelle LANRE Subscriber#: [id number]    Group#:  Precert#:              History & Physical      Jerry Adames MD at 12/1/2017  4:24 PM              [facility] Medicine Services  HISTORY " "AND PHYSICAL    Patient Name: Katie Crocker  : 3/1/1923  MRN: [id number]  Primary Care Physician: Jose Armando Sharif MD    Subjective   Subjective     Chief Complaint: fever    HPI:  Katie Crocker is a 89 y.o. female with past medical history significant for atrial fibrillation on Coumadin, CVA with right side hemiparesis, hypertension, CHF (EF unknown), frequent urinary tract infections who presents to the emergency department with fever.  She is a nursing home resident ([facility]).  History of present illness is obtained from daughter and chart.  She developed fever on .  Chest x-ray was performed at [facility] and per daughter's report was clear (data deficient).  [facility] contacted daughter on  to report no fever.  Fever developed today and she was brought to the emergency department.  In emergency department she was found to have pneumonia, CHF, UTI.  Chart review reveals history of multidrug resistant UTI.  She met sepsis criteria.    On my examination patient is minimally responsive and will not answer any questions.  ================  89 YO F WITH HISTORY OF CHRONIC AFIB ON COUMADIN, MDR UTI, REMOTE CVA WITH RIGHT SIDED RESIDUAL WEAKNESS/WC BOUND & INTERMITTENT DYSPHAGIA, R HEARING LOSS, BIPOLAR D/O AND HYPOTHYROIDISM WHO WAS BROUGHT IN FROM NH DUE TO AMS/FEVER, .8. PT IS UNABLE TO PROVIDE ANY HISTORY, SO HISTORY WAS OBTAINED FROM DAUGHTER, CHART AND ED PROVIDERS. APPARENTLY \"FELT COLD\" ABOUT 3 DAYS AGO, BUT NO OTHER COMPLAINTS. NORMALLY ALERT, PLEASANT, AND CLEAR SPEECH. DIAGNOSED WITH SEVERE SEPSIS SECONDARY TO PNEUMONIA AND UTI, WITH ASSOCIATED COAGULOPATHY- PT >100.    Review of Systems   Unable to obtain due to dementia      Personal History     Past Medical History:   Diagnosis Date   • Bipolar disorder    • CHF (congestive heart failure)    • Dehydration    • Depression    • Disease of thyroid gland    • Diverticulosis    • GERD (gastroesophageal reflux disease)    • Gout  "   • Hyperkalemia    • Hyperlipidemia    • Hypertension    • Insomnia    • Pneumonia    • Renal disorder    • Sepsis    • Stroke    • SVT (supraventricular tachycardia)    • TIA (transient ischemic attack)    • UTI (urinary tract infection)        Past Surgical History:   Procedure Laterality Date   • BACK SURGERY     • HYSTERECTOMY     • REPLACEMENT TOTAL KNEE BILATERAL         Family History: family history includes Pneumonia in her father; Stroke in her mother.     Social History:  reports that she has never smoked. She has never used smokeless tobacco. She reports that she does not drink alcohol or use illicit drugs.    Medications:  Prescriptions Prior to Admission   Medication Sig Dispense Refill Last Dose   • acetaminophen (TYLENOL) 325 MG tablet Take 650 mg by mouth Every 6 (Six) Hours As Needed for Mild Pain .      • allopurinol (ZYLOPRIM) 300 MG tablet Take 150 mg by mouth Daily.      • amLODIPine (NORVASC) 5 MG tablet Take 5 mg by mouth 2 (Two) Times a Day.      • cholecalciferol (VITAMIN D3) 1000 units tablet Take 1,000 Units by mouth Daily.      • citalopram (CeleXA) 20 MG tablet Take 20 mg by mouth Daily.      • digoxin (LANOXIN) 125 MCG tablet Take 125 mcg by mouth Daily. 1/2 tablet one time daily      • divalproex (DEPAKOTE) 500 MG DR tablet Take 500 mg by mouth Daily.      • guaiFENesin (MUCINEX) 600 MG 12 hr tablet Take 600 mg by mouth 2 (Two) Times a Day.      • levothyroxine (SYNTHROID, LEVOTHROID) 50 MCG tablet Take 50 mcg by mouth Daily.      • magnesium hydroxide (MILK OF MAGNESIA) 400 MG/5ML suspension Take 30 mL by mouth 2 (Two) Times a Day As Needed for Constipation.      • metoprolol tartrate (LOPRESSOR) 25 MG tablet Take 75 mg by mouth 2 (Two) Times a Day.      • mirtazapine (REMERON) 7.5 MG half tablet Take 7.5 mg by mouth Every Night.      • Multiple Vitamin (THERA-TABS PO) Take 1 tablet by mouth Daily.      • omeprazole (priLOSEC) 20 MG capsule Take 20 mg by mouth Daily.      •  polyethylene glycol (MIRALAX) packet Take 17 g by mouth Daily.      • pravastatin (PRAVACHOL) 40 MG tablet Take 40 mg by mouth Daily.      • warfarin (COUMADIN) 2 MG tablet Take 2 mg by mouth See Admin Instructions. Only on Fridays       • warfarin (COUMADIN) 3 MG tablet Take 3 mg by mouth See Admin Instructions. Everyday but Friday           Allergies   Allergen Reactions   • Lithium        Objective   Objective     Vital Signs:   Temp:  [98.1 °F (36.7 °C)-102.8 °F (39.3 °C)] 98.1 °F (36.7 °C)  Heart Rate:  [] 100  Resp:  [34-36] 34  BP: ()/(52-60) 97/52        Physical Exam   Constitutional: No acute distress, age appropriate  Eyes: PERRLA, sclerae anicteric, no conjunctival injection  HENT: NCAT, mucous membranes dry  Neck: Supple, no thyromegaly, no lymphadenopathy, trachea midline  Respiratory: Rales bilateral bases, poor inspiratory effort   Cardiovascular: Irregularly irregular, atrial fibrillation controlled rate, no murmurs, rubs, or gallops, palpable pedal pulses bilaterally  Gastrointestinal: Positive bowel sounds, soft, nontender, nondistended  Musculoskeletal: No bilateral ankle edema, no clubbing or cyanosis to extremities  Psychiatric: Lethargic, quiet  Neurologic: Right-sided hemo-paresis, weak left , very lethargic, will not answer questions, barely opens eyes to stimulus and then immediately closes them  Skin: No rashes  ---------  LETHARGIC, AROUSABLE  IRREGULAR RHYTHM, REG RATE, S1/S1 NORMAL  GOOD AIR FLOW BILAT, BUT POOR INSPIRATORY EFFORTS, +RALES BILAT BASES, NO WHEEZING, RESP NON-LABORED  ABD SOFT/NT/ND + BS  NO LE EDEMA  CHRONIC RIGHT HEMIPARESIS    Results Reviewed:  I have personally reviewed current lab, radiology, and data and agree.      Results from last 7 days  Lab Units 12/01/17  1139   WBC 10*3/mm3 18.53*   HEMOGLOBIN g/dL 9.6*   HEMATOCRIT % 29.3*   PLATELETS 10*3/mm3 285       Results from last 7 days  Lab Units 12/01/17  1219   SODIUM mmol/L 133   POTASSIUM mmol/L  4.1   CHLORIDE mmol/L 101   CO2 mmol/L 22.0   BUN mg/dL 40*   CREATININE mg/dL 2.40*   GLUCOSE mg/dL 104*   CALCIUM mg/dL 8.4*   ALT (SGPT) U/L 6*   AST (SGOT) U/L 26     BNP   Date Value Ref Range Status   12/01/2017 1060.0 (H) 0.0 - 100.0 pg/mL Final     pH, Arterial   Date Value Ref Range Status   12/01/2017 7.399 7.350 - 7.450 pH units Final     Imaging Results (last 24 hours)     Procedure Component Value Units Date/Time    XR Chest 1 View [[id number]] Collected:  12/01/17 1330     Updated:  12/01/17 1330    Narrative:       EXAMINATION: XR CHEST 1 VW- 12/01/2017     INDICATION: shortness of air     COMPARISON: NONE     FINDINGS: Cardiomegaly is noted. Bibasilar airspace opacities are noted  which are ill-defined and irregular. There is mild nodularity and  congestive abnormalities in the hilar areas. The upper lung zones are  clear.           Impression:       1. Cardiomegaly is noted with ill-defined airspace linear nodular  opacities in the mid and lower lung zones with dense consolidation at  the bases, worse on the left than right.  2. Upper lung zones are otherwise clear.     D:  12/01/2017  E:  12/01/2017                   Assessment/Plan   Assessment / Plan     Hospital Problem List     * (Principal)Sepsis    HCAP (healthcare-associated pneumonia)    Urinary tract infection    Hypertension    Hyperlipidemia    GERD (gastroesophageal reflux disease)    CHF (congestive heart failure)    Depression    Gout    Hypothyroidism    Hemiparesis affecting right side as late effect of stroke    Atrial fibrillation    Supratherapeutic INR    Acute on chronic kidney failure    Anemia            Assessment & Plan:  --meets sepsis criteria given source of infection, hypotension, AMS, elevated procal, will treat for HCAP and UTI with Merrem, Vanc, and Doxy. Consult ID given her hx of MDR UTI. Gentle IVF, blood cultures  --unsure of baseline Creatinine, give IVF, obtain urine studies, hold nephrotoxic medications  --BNP  elevated and likely has component of CHF going on as well, will obtain ECHO, hold on diuresis as she is septic, does not clinically appear overloaded and has KATIE, will anchor mark, needs strict I&O  --INR too high to calculate, will give 5mg of Vitamin K now, discussed with pharmacy  --ABG normal, AMS likely related to sepsis  --anemia labs, no overt signs of bleeding  --restart home meds as appropriate    DVT prophylaxis: hold INR greater than 10    CODE STATUS:  Conditional Code    CASE DISCUSSED WITH APRN. PT IS VERY ILL WITH SEVERE SEPSIS SECONDARY TO MDR UTI/PNEUMONIA, POSS ASPIRATION, WITH ASOOCIATED HYPOTENSION (LIKELY DEHYDRATION AND SEPSIS RELATED) AND SIGNIFICANT COAGULOPATHY, INR > 100, SO WILL REVERSE WITH 5MG VIT K. SHE IS STARTED ON IV ABX AND ID CONSULTED. WILL PLACE MARK FOR STRICT I&O, CONSIDER DC ONCE IMPROVED. SLP TO EVAL ONCE MORE ALERT.    CURRENT CONDITION IS GUARDED.    Admission Status:  I believe this patient meets INPATIENT status due to the need for care which can only be reasonably provided in an hospital setting such as aggressive/expedited ancillary services and/or consultation services, the necessity for IV medications, close physician monitoring and/or the possible need for procedures.  In such, I feel patient’s risk for adverse outcomes and need for care warrant INPATIENT evaluation and predict the patient’s care encounter to likely last beyond 2 midnights.      MOODY Ruelas   12/01/17   5:13 PM           Electronically signed by Jerry Adames MD at 12/1/2017  8:18 PM

## 2017-12-13 NOTE — PROGRESS NOTES
[facility] Medicine Services  PROGRESS NOTE    Patient Name: Katie Crocker  : 3/1/1923  MRN: [id number]    Date of Admission: 2017  Length of Stay: 12  Primary Care Physician: Jose Armando Sharif MD    Subjective   Subjective     CC:  fever / severe sepsis (POA)    HPI:  On 3 L/min by NC ~88% sats.  No fevers today.  No chest pain.  No shortness of breath.  No family in room on visit.  Afebrile for over 48 hours    Review of Systems  Gen- No fevers, chills  CV- No chest pain, palpitations  Resp- No cough, dyspnea  GI- No N/V/D, abd pain    Otherwise ROS is negative except as mentioned in the HPI.    Objective   Objective     Vital Signs:   Temp:  [97.9 °F (36.6 °C)-98.1 °F (36.7 °C)] 97.9 °F (36.6 °C)  Heart Rate:  [70-86] 82  Resp:  [20-22] 20  BP: (121-176)/(55-90) 121/90      Physical Exam:  Constitutional: No acute distress, awake, on supplemental oxygen by NC - 3 L/min  HENT: NCAT, mucous membranes moist  Respiratory: grossly clear, poor inspiratory capacity  Cardiovascular: RRR, s1 and s2  Gastrointestinal: Positive bowel sounds, soft, nontender, nondistended  Musculoskeletal: No bilateral ankle edema  Psychiatric: Appropriate affect, cooperative  Neurologic: Oriented x 3, strength symmetric in all extremities, Cranial Nerves grossly intact to confrontation, speech clear  Skin: No rashes      Results Reviewed:  I have personally reviewed current lab, radiology, and data and agree.      Results from last 7 days  Lab Units 17  0506 17  0845 17  0509  17  0449 17  0440   WBC 10*3/mm3  --   --  15.49*  --  13.77* 14.84*   HEMOGLOBIN g/dL  --   --  10.4*  --  9.7* 9.4*   HEMATOCRIT %  --   --  33.2*  --  30.7* 29.8*   PLATELETS 10*3/mm3  --   --  324  --  321 336   INR  1.40 1.50 2.91  < > 3.33 3.94   < > = values in this interval not displayed.    Results from last 7 days  Lab Units 17  0509 17  0449 17  0440   SODIUM mmol/L  137 139 140   POTASSIUM mmol/L 3.8 4.4 3.9   CHLORIDE mmol/L 101 107 110*   CO2 mmol/L 29.0 22.0 23.0   BUN mg/dL 9 9 11   CREATININE mg/dL 0.70 0.70 0.80   GLUCOSE mg/dL 80 68* 84   CALCIUM mg/dL 8.4* 8.4* 8.6*     No results found for: BNP  No results found for: PHART    Microbiology Results Abnormal     Procedure Component Value - Date/Time    Blood Culture - Blood, [[id number]]  (Normal) Collected:  12/05/17 1420    Lab Status:  Final result Specimen:  Blood from Arm, Left Updated:  12/10/17 1501     Blood Culture No growth at 5 days    Blood Culture - Blood, [[id number]]  (Normal) Collected:  12/05/17 1415    Lab Status:  Final result Specimen:  Blood from Arm, Left Updated:  12/10/17 1501     Blood Culture No growth at 5 days    Urine Culture - Urine, Urine, Clean Catch [[id number]]  (Abnormal) Collected:  12/05/17 2009    Lab Status:  Final result Specimen:  Urine from Urine, Clean Catch Updated:  12/07/17 1146     Urine Culture --      20,000-30,000 CFU/mL Yeast isolated (A)    Blood Culture - Blood, [[id number]]  (Normal) Collected:  12/01/17 1148    Lab Status:  Final result Specimen:  Blood from Arm, Left Updated:  12/06/17 1231     Blood Culture No growth at 5 days    Blood Culture - Blood, [[id number]]  (Normal) Collected:  12/01/17 1140    Lab Status:  Final result Specimen:  Blood from Arm, Right Updated:  12/06/17 1231     Blood Culture No growth at 5 days    Urine Culture - Urine, Urine, Clean Catch [[id number]]  (Abnormal)  (Susceptibility) Collected:  12/01/17 1137    Lab Status:  Final result Specimen:  Urine from Urine, Catheter Updated:  12/03/17 1143     Urine Culture --      >100,000 CFU/mL Escherichia coli (A)    Susceptibility      Escherichia coli     MARCUS     Ampicillin >16 ug/ml Resistant     Ampicillin + Sulbactam >16/8 ug/ml Resistant     Aztreonam <=8 ug/ml Susceptible     Cefepime <=8 ug/ml Susceptible     Cefotaxime <=2 ug/ml Susceptible     Ceftriaxone <=8 ug/ml Susceptible     Cefuroxime  sodium <=4 ug/ml Susceptible     Cephalothin 16 ug/ml Intermediate     Ertapenem <=1 ug/ml Susceptible     Gentamicin <=4 ug/ml Susceptible     Levofloxacin <=2 ug/ml Susceptible     Meropenem <=1 ug/ml Susceptible     Nitrofurantoin <=32 ug/ml Susceptible     Piperacillin + Tazobactam <=16 ug/ml Susceptible     Tetracycline 8 ug/ml Intermediate     Tobramycin <=4 ug/ml Susceptible     Trimethoprim + Sulfamethoxazole >2/38 ug/ml Resistant                    Eosinophil Smear - Urine, Urine, Clean Catch [[id number]]  (Normal) Collected:  12/01/17 1702    Lab Status:  Final result Specimen:  Urine from Urine, Clean Catch Updated:  12/01/17 2137     Eosinophil Smear 0 % EOS/100 Cells     Narrative:       No eosinophil seen    Influenza A & B, RT PCR - Swab, Nasopharynx [[id number]]  (Normal) Collected:  12/01/17 1703    Lab Status:  Final result Specimen:  Swab from Nasopharynx Updated:  12/01/17 1820     Influenza A PCR Not Detected     Influenza B PCR Not Detected          Imaging Results (last 24 hours)     ** No results found for the last 24 hours. **        Results for orders placed during the hospital encounter of 12/01/17   Adult Transthoracic Echo Complete W/ Cont if Necessary Per Protocol    Narrative · Left ventricular systolic function is normal. Estimated EF = 55%.  · Mild aortic valve regurgitation is present.  · Mild-to-moderate mitral valve regurgitation is present  · Moderate to severe tricuspid valve regurgitation is present.  · Calculated right ventricular systolic pressure from tricuspid   regurgitation is 55 mmHg.          I have reviewed the medications.    Assessment/Plan   Assessment / Plan     Hospital Problem List     * (Principal)Sepsis    HCAP (healthcare-associated pneumonia)    Urinary tract infection    Hypertension    Hyperlipidemia    GERD (gastroesophageal reflux disease)    CHF (congestive heart failure)    Depression    Gout    Hypothyroidism    Hemiparesis affecting right side as late  effect of stroke    Atrial fibrillation    Supratherapeutic INR    Acute on chronic kidney failure    Anemia             Brief Hospital Course to date:  Katie Crocker is a 89 y.o. female who presented from SNF with fever, severe sepsis, and coagulopathy.      Assessment & Plan:  * Severe Sepsis POA - improving - ID following - high procalcitonin on admission     *HCAP with CXR evidence of consolidation, on antibiotics and currently afebrile.     * UTI     * Altered mental status secondary to sepsis.  Improving.     * Dysphagia.  Patient failed swallow test twice     * Hypertension.     * history of congestive heart failure details unknown - last EF 55% - valvular heart disease on Echo this admission     * Acute on Chronic Kidney Disease - improving - back to baseline now     * Supratherapeutic INR on admission - INR 1.4 today - adjusting warfarin daily    Inpatient Consult to Hospice ordered - Hospice Team to discuss case with Daughter today 12/13    DVT Prophylaxis:  Anticoagulated with warfarin    CODE STATUS: Comfort Measures and Allow Natural Death (A-N-D)    Disposition: I expect the patient to be discharged to be determined    Dinesh Berumen MD  12/13/17  12:34 PM

## 2017-12-13 NOTE — PROGRESS NOTES
"Pharmacy Consult  -  Warfarin    Katie Crocker is a 89 y.o. female   Height - 160 cm (63\")  Weight - 58 kg (127 lb 14.2 oz)    Consulting Provider: - MOODY Ruelas  Indication: - afib  Goal INR: - 2-3  Home Regimen:   - 2 mg on fridays   - 3 mg all other days    Drug-Drug Interactions with current regimen:       Citalopram 20 mg po daily (increase risk of bleeding)       Mirtazapine 7.5 mg po daily (increase risk of bleeding)       Divalproex 500 mg po daily (increase risk of bleeding)       Warfarin Dosing During Admission:    Date  12/1 12/2 12/3 12/4 12/05 12/06 12/07 12/08 12/9   INR  aptt too high to measure inr -   Vitamin K 5mg 1.29 1.26 1.93 3.03 3.75 3.86 3.94 3.33   Dose  ----- 1 mg 1 mg 0.5 mg   hold hold hold hold 0.5mg     Date 12/10 12/11 12/12 12/13        INR 2.73 2.91 1.50 1.40        Dose 0.5mg hold 1 mg 1mg             Labs:    Results from last 7 days   Lab Units 12/13/17  0506 12/12/17  0845 12/11/17  0509 12/10/17  0502 12/09/17  0449 12/08/17  0440 12/07/17  0431   INR  1.40 1.50 2.91 2.73 3.33 3.94 3.86   HEMOGLOBIN g/dL --  --  10.4* --  9.7* 9.4* 9.4*   HEMATOCRIT % --  --  33.2* --  30.7* 29.8* 29.9*   PLATELETS 10*3/mm3 --  --  324 --  321 336 312     Results from last 7 days   Lab Units 12/11/17  0509 12/09/17  0449 12/08/17  0440   SODIUM mmol/L 137 139 140   POTASSIUM mmol/L 3.8 4.4 3.9   CHLORIDE mmol/L 101 107 110*   CO2 mmol/L 29.0 22.0 23.0   BUN mg/dL 9 9 11   CREATININE mg/dL 0.70 0.70 0.80   CALCIUM mg/dL 8.4* 8.4* 8.6*   GLUCOSE mg/dL 80 68* 84         Current dietary intake: bites of breakfast 12/12; very limited po intake   Diet Order   Procedures   • Diet Soft Texture; Ground; Thin     Patient Education: nursing home resident    Assessment/Plan:   On admission patient was given Vitamin K 5mg IV x 1 (12/1 @ 3762).   Warfarin resumed cautiously 12/02 at a reduced dose.  Today, patient's INR is subtherapeutic at 1.40. Likely reflective of the doses held 12/04 " through 12/08.  Given patient's sensitivity to warfarin, clinical status, and lack of oral intake we will cautiously give Warfarin 1mg today.     Continue to monitor for s/sx of bleeding/clotting, drug-drug interactions, and dietary intake.   Pharmacy will follow closely and dose adjust accordingly.     Martina Renner, PharmD Candidate 2018  12/13/2017  8:02 AM    Vidal Hooper PharmD  Pharmacy Resident  12/13/2017  1:06 PM

## 2017-12-13 NOTE — PROGRESS NOTES
"Palliative Care Progress Note    Date of Admission: 12/1/2017    Code Status: comfort, AND  Advance Directive: poa completed but not on medical record  Surrogate decision maker: Daughters: Alma Crocker and Radha Mike    Subjective:    Patient denies any pain, dyspnea, nausea. Requesting to be allowed to sleep.   Reviewed and updated with nursing.   Reports only taking a few bites.   Reviewed current scheduled and prn medications for route, type, dose, and frequency.    Pharmacy to dose warfarin      •  acetaminophen  •  ondansetron  •  Pharmacy to dose warfarin  •  polyethylene glycol  •  potassium chloride  •  potassium chloride  •  sennosides-docusate sodium  •  sodium chloride  •  sodium chloride    Objective:  PPS 20%   /90  Pulse 82  Temp 97.9 °F (36.6 °C) (Oral)   Resp 20  Ht 160 cm (63\")  Wt 58 kg (127 lb 14.2 oz)  SpO2 95%  BMI 22.65 kg/m2   Intake & Output (last day)       12/12 0701 - 12/13 0700 12/13 0701 - 12/14 0700    P.O. 60 120    IV Piggyback      Total Intake(mL/kg) 60 (1) 120 (2.1)    Urine (mL/kg/hr)      Total Output        Net +60 +120          Unmeasured Urine Occurrence 6 x 2 x        Lab Results (last 24 hours)     Procedure Component Value Units Date/Time    Protime-INR [[id number]]  (Abnormal) Collected:  12/13/17 0506    Specimen:  Blood Updated:  12/13/17 0739     Protime 15.4 (H) Seconds      INR 1.40    Narrative:       Therapeutic Ranges for INR: 2.0-3.0 (PT 20-30)                              2.5-3.5 (PT 25-34)        Imaging Results (last 24 hours)     ** No results found for the last 24 hours. **          Physical Exam:  Gen: NAD, resting in bed  Skin: warm, dry   Eyes: CANDIE, conjunctiva clear and moist   Resp/thorax: even effort, non labored, CTA   CV: RRR   ABD: soft, bowel sounds +, nontender  Ext: no edema, no redness   Neuro: opens eyes to name, follows commands, equal  mod strength, able to life both lower extremities off of bed minimally, no " myoclonus       Reviewed labs and diagnostic results.   No results found for: HGBA1C    Results from last 7 days  Lab Units 12/11/17  0509   WBC 10*3/mm3 15.49*   HEMOGLOBIN g/dL 10.4*   HEMATOCRIT % 33.2*   PLATELETS 10*3/mm3 324       Results from last 7 days  Lab Units 12/11/17  0509   SODIUM mmol/L 137   POTASSIUM mmol/L 3.8   CHLORIDE mmol/L 101   CO2 mmol/L 29.0   BUN mg/dL 9   CREATININE mg/dL 0.70   CALCIUM mg/dL 8.4*   GLUCOSE mg/dL 80       Impression: 89 y.o. female with sepsis, bibasilar pneumonia, acute complicated recurrent UTI, E Coli    Plan:    Diminished po intake, anorexia - continue to monitor  Unsure of purpose of mirtazapine, but patient is not having improved appetite in two weeks. Will discontinue it, especially with patient diminished ability to take po meds and increase tiredness.      Goals of care - patient is agreeable to returning to [facility] with hospice care.   Talked with daughter, Alma, over the telephone for 30 minutes. Reviewed hospice care, answered questions about palliative medicine and hospice care in LTC facility.   Reviewed current plan of care and hospice plan of care, changes with declining functional status and recognition that the patient is being more fatigued.   Asked about continuation of warfarin, daughter stated that the patient stated on Sunday that she wants to continue taking it.     Plan - return to [facility] with hospice care  Time: 30 minutes   > 50% time spent in counseling and education about options with establishing plan of care, palliative and hospice care at LT facilities with the daughter over the telephone.     Yakelin Quick, APRN  [phone]  12/13/17  2:06 PM

## 2017-12-13 NOTE — PLAN OF CARE
Problem: Patient Care Overview (Adult)  Goal: Plan of Care Review  Outcome: Ongoing (interventions implemented as appropriate)    12/13/17 1015 12/13/17 1200 12/13/17 1513   Coping/Psychosocial Response Interventions   Plan Of Care Reviewed With --  patient --    Patient Care Overview   Progress no change --  --    Outcome Evaluation   Outcome Summary/Follow up Plan --  --  Patient's PO intake has been poor today; although pt is tolerating meds in apple sauce followed by thin liquids. Pt tachypenic at times, especially when turned or laid flat. Pt to go back to [facility] with hospice following via Ambulance at 1300         Problem: Fall Risk (Adult)  Goal: Identify Related Risk Factors and Signs and Symptoms  Outcome: Ongoing (interventions implemented as appropriate)  Goal: Absence of Falls  Outcome: Ongoing (interventions implemented as appropriate)

## 2017-12-14 VITALS
HEIGHT: 63 IN | HEART RATE: 76 BPM | OXYGEN SATURATION: 95 % | BODY MASS INDEX: 22.66 KG/M2 | WEIGHT: 127.89 LBS | SYSTOLIC BLOOD PRESSURE: 121 MMHG | TEMPERATURE: 97.9 F | DIASTOLIC BLOOD PRESSURE: 97 MMHG | RESPIRATION RATE: 20 BRPM

## 2017-12-14 PROBLEM — N18.9 ACUTE ON CHRONIC KIDNEY FAILURE (HCC): Status: RESOLVED | Noted: 2017-12-01 | Resolved: 2017-12-14

## 2017-12-14 PROBLEM — N17.9 ACUTE ON CHRONIC KIDNEY FAILURE (HCC): Status: RESOLVED | Noted: 2017-12-01 | Resolved: 2017-12-14

## 2017-12-14 PROBLEM — R79.1 SUPRATHERAPEUTIC INR: Status: RESOLVED | Noted: 2017-12-01 | Resolved: 2017-12-14

## 2017-12-14 LAB
INR PPP: 1.74
PROTHROMBIN TIME: 19.3 SECONDS (ref 9.6–11.5)

## 2017-12-14 PROCEDURE — 85610 PROTHROMBIN TIME: CPT | Performed by: NURSE PRACTITIONER

## 2017-12-14 PROCEDURE — 99239 HOSP IP/OBS DSCHRG MGMT >30: CPT | Performed by: NURSE PRACTITIONER

## 2017-12-14 RX ORDER — L.ACID,PARA/B.BIFIDUM/S.THERM 8B CELL
1 CAPSULE ORAL DAILY
Start: 2017-12-15

## 2017-12-14 RX ORDER — SENNA AND DOCUSATE SODIUM 50; 8.6 MG/1; MG/1
2 TABLET, FILM COATED ORAL 2 TIMES DAILY PRN
Start: 2017-12-14

## 2017-12-14 RX ORDER — ATORVASTATIN CALCIUM 10 MG/1
10 TABLET, FILM COATED ORAL DAILY
Start: 2017-12-15

## 2017-12-14 RX ORDER — WARFARIN SODIUM 1 MG/1
1 TABLET ORAL
Start: 2017-12-14

## 2017-12-14 RX ORDER — NYSTATIN 100000 [USP'U]/G
POWDER TOPICAL EVERY 12 HOURS SCHEDULED
Start: 2017-12-14

## 2017-12-14 RX ADMIN — ATORVASTATIN CALCIUM 10 MG: 10 TABLET, FILM COATED ORAL at 09:42

## 2017-12-14 RX ADMIN — METOPROLOL TARTRATE 25 MG: 25 TABLET ORAL at 09:42

## 2017-12-14 RX ADMIN — LEVOTHYROXINE SODIUM 50 MCG: 50 TABLET ORAL at 09:42

## 2017-12-14 RX ADMIN — NYSTATIN: 100000 POWDER TOPICAL at 09:42

## 2017-12-14 RX ADMIN — ALLOPURINOL 150 MG: 300 TABLET ORAL at 09:42

## 2017-12-14 RX ADMIN — CITALOPRAM HYDROBROMIDE 20 MG: 20 TABLET ORAL at 09:42

## 2017-12-14 RX ADMIN — Medication 1 CAPSULE: at 09:42

## 2017-12-14 RX ADMIN — DIGOXIN 125 MCG: 125 TABLET ORAL at 12:44

## 2017-12-14 RX ADMIN — DIVALPROEX SODIUM 500 MG: 500 TABLET, DELAYED RELEASE ORAL at 09:42

## 2017-12-14 NOTE — DISCHARGE PLACEMENT REQUEST
"EDWIN LOREDO, RN    [phone]          Katie Crocker (89 y.o. Female)     Date of Birth Social Security Number Address Home Phone MRN    [id number]  [id number] [facility] [id number] [phone] [id number]    Catholic Marital Status          P[employer]ism        Admission Date Admission Type Admitting Provider Attending Provider Department, Room/Bed    17 Emergency Mahad Chandler MD Dossett, Lee M, MD [facility] 4G, S457/    Discharge Date Discharge Disposition Discharge Destination         Skilled Nursing Facility (DC - External)             Attending Provider: Mahad Chandler MD     Allergies:  Lithium    Isolation:  None   Infection:  None   Code Status:  Comfort Analisa    Ht:  160 cm (63\")   Wt:  58 kg (127 lb 14.2 oz)    Admission Cmt:  None   Principal Problem:  Sepsis [A41.9]                 Active Insurance as of 2017     Primary Coverage     Payor Plan Insurance Group Employer/Plan Group    [place]      Payor Plan Address Payor Plan Phone Number Effective From Effective To    [address] [phone] 2017     [place] [address]       Subscriber Name Subscriber Birth Date Member ID       KATIE CROCKER 3/1/1923 [id number]                 Emergency Contacts      (Rel.) Home Phone Work Phone Mobile Phone    Alma Crocker (Power of ) -- -- [phone]    Radha Mike (Power of ) -- -- [phone]    Nargis Beckwith -- -- --               Discharge Summary      MOODY Chavez at 2017 11:21 AM              [facility] Medicine Services  TRANSFER SUMMARY    Patient Name: Katie Crocker  : 3/1/1923  MRN: [id number]    Date of Admission: 2017  Date of Discharge:    Length of Stay: 13  Primary Care Physician: Jose Armando Sharif MD    Consults     Date and Time Order Name Status Description    2017 1413 " Inpatient Consult to Palliative Care MD Completed     12/1/2017 1602 Inpatient Consult to Infectious Diseases Completed           Hospital Course     Presenting Problem:   Sepsis [A41.9]      Active Hospital Problems (** Indicates Principal Problem)    Diagnosis Date Noted   • **Sepsis [A41.9] 12/01/2017   • HCAP (healthcare-associated pneumonia) [J18.9] 12/01/2017   • Urinary tract infection [N39.0] 12/01/2017   • Hypothyroidism [E03.9] 12/01/2017   • Hemiparesis affecting right side as late effect of stroke [I69.351] 12/01/2017   • Atrial fibrillation [I48.91] 12/01/2017   • Anemia [D64.9] 12/01/2017   • Hypertension [I10]    • Hyperlipidemia [E78.5]    • GERD (gastroesophageal reflux disease) [K21.9]    • CHF (congestive heart failure) [I50.9]    • Depression [F32.9]    • Gout [M10.9]       Resolved Hospital Problems    Diagnosis Date Noted Date Resolved   • Supratherapeutic INR [R79.1] 12/01/2017 12/14/2017   • Acute on chronic kidney failure [N17.9, N18.9] 12/01/2017 12/14/2017          Hospital Course:  Katie Crocker is a 89 y.o. female with past medical history significant for atrial fibrillation on Coumadin, CVA with right side hemiparesis, hypertension, CHF, frequent urinary tract infections who presents to the emergency department with fever.  She is a nursing home resident ([facility]).  History of present illness was obtained from daughter and chart. She developed fever on 11/29.  Chest x-ray was performed at [facility] and per daughter's report was clear (data deficient).  [facility] contacted daughter on 11/30 to report no fever.  Fever developed on 12/1 and she was brought to the emergency department.  In emergency department she was found to have pneumonia, CHF, UTI, A/KCD.  Chart review revealed history of multidrug resistant UTI. She met sepsis criteria on admit due to infection, hypotension, AMS and elevated procalcitonin.  Arm was too high to calculate and she was given vitamin K for reversal.   Self catheter was anchored due to AK I and altered mental status.  She was started on Merrem, vancomycin and doxycycline.  This is due to her current infection as well as history of MDR UTIs.  Cultures were obtained.  She was admitted to hospital medicine service for further evaluation and management.    Infectious disease was consulted and followed for antibiotic management due to her H CAP, MDR UTI and sepsis present on admission with altered mental status.  Acute on chronic kidney disease resolved.  She was slowly restarted on her Coumadin per her daughter's request for her history of A. fib.  She did have labile INRs.  Currently now on 1 mg daily and today's INR is 1.74.  He has mild dysphagia and went swallow eval and failed ×2 now on a dysphagia diet.  Will status slowly improved as her infection resolved.  Due to her age, multiple comorbidities along with this acute illness decreased appetite and decreased mobility with debility, palliative care was consulted.  Pt/family elected for comfort care measures and AND status.  She continued to have anorexia.  Continued with dysphagia.  Her altered mental status has improved.  Continues to be very weak, decreased appetite.  Finally due to patient's poor clinical improvement patient and daughter elected to stop antibiotics and pursue comfort measures only.  Daughter did wish to continue Coumadin at this time.  Infectious disease discontinued antibiotics and signed off.  Hospice care was consulted.  Decision was ultimately to transfer back to her bed at [facility] with hospice to follow.  Today she is seen resting upright in bed in no acute distress.  Visitors at bedside currently.  No issues reported per nursing staff.  Patient will transfer back to [facility] with hospice to follow today via ambulance at 1 PM.  Recommend continuing 1 mg of Coumadin daily (as daughter wishes to continue Coumadin for now) with next PT/INR tomorrow  12/15.  Provider at facility/PCP to manage Coumadin.  Would recommend daily PT/INRs until therapeutic between 2-3.  Need to consider stopping Coumadin in the near future if patient maintains hospice status continued weakness, due to risk of falls and bleeding.  We'll defer this to provider's outpatient.  She should be seen by provider at skilled facility in 24-48 hours, PCP 1 week, hospice to follow.          Day of Discharge     HPI:   Pt is seeen at 0900 am resting back in bed.  NAD.  NO family at bedside.  Wearing 3LNC oxygen.  Sats 92 %. Dozing mostly.  Awakens easily. Reports poor appetite but no n/v.  No cp, soa.  No new issues    Review of Systems  Gen- No fevers, chills  CV- No chest pain, palpitations  Resp- No cough, dyspnea  GI- No N/V/D, abd pain    Otherwise complete ROS is negative except as mentioned in the HPI.    Vital Signs:   Heart Rate:  [] 108  BP: (121-167)/(78-90) 167/78     Physical Exam:  Constitutional: No acute distress, dozing mostly but awakens easily, on supplemental oxygen by NC 3 L/min.  No visitors at bs.   HENT: NCAT, mucous membranes moist  Respiratory: grossly clear, poor inspiratory capacity.  No acute resp distress  Cardiovascular: RRR, s1 and s2  Gastrointestinal: Positive bowel sounds, soft, nontender, nondistended  Musculoskeletal: No bilateral ankle edema.    Psychiatric: Appropriate affect, cooperative, calm but quiet  Neurologic: Awakens easily, Oriented , strength symmetric in all extremities, Cranial Nerves grossly intact to confrontation, speech clear  Skin: No rashes      Pertinent Results       Results from last 7 days  Lab Units 12/11/17  0509 12/09/17  0449 12/08/17  0440 12/07/17 2007   WBC 10*3/mm3 15.49* 13.77* 14.84*  --    HEMOGLOBIN g/dL 10.4* 9.7* 9.4*  --    HEMATOCRIT % 33.2* 30.7* 29.8*  --    PLATELETS 10*3/mm3 324 321 336  --    SODIUM mmol/L 137 139 140  --    POTASSIUM mmol/L 3.8 4.4 3.9 4.7   CHLORIDE mmol/L 101 107 110*  --    CO2 mmol/L 29.0  22.0 23.0  --    BUN mg/dL 9 9 11  --    CREATININE mg/dL 0.70 0.70 0.80  --    GLUCOSE mg/dL 80 68* 84  --    CALCIUM mg/dL 8.4* 8.4* 8.6*  --        Results from last 7 days  Lab Units 12/14/17  0426 12/13/17  0506 12/12/17  0845 12/11/17  0509 12/10/17  0502 12/09/17  0449 12/08/17  0440   PROTIME Seconds 19.3* 15.4* 16.6* 32.8* 30.7* 37.7* 44.8*   INR  1.74 1.40 1.50 2.91 2.73 3.33 3.94           Invalid input(s): TG, LDLREALC      Brief Urine Lab Results  (Last result in the past 365 days)      Color   Clarity   Blood   Leuk Est   Nitrite   Protein   CREAT   Urine HCG        12/05/17 2009 Yellow Clear Negative Small (1+)(A) Negative 30 mg/dL (1+)(A)               Microbiology Results Abnormal     Procedure Component Value - Date/Time    Blood Culture - Blood, [[id number]]  (Normal) Collected:  12/05/17 1420    Lab Status:  Final result Specimen:  Blood from Arm, Left Updated:  12/10/17 1501     Blood Culture No growth at 5 days    Blood Culture - Blood, [[id number]]  (Normal) Collected:  12/05/17 1415    Lab Status:  Final result Specimen:  Blood from Arm, Left Updated:  12/10/17 1501     Blood Culture No growth at 5 days    Urine Culture - Urine, Urine, Clean Catch [[id number]]  (Abnormal) Collected:  12/05/17 2009    Lab Status:  Final result Specimen:  Urine from Urine, Clean Catch Updated:  12/07/17 1146     Urine Culture --      20,000-30,000 CFU/mL Yeast isolated (A)    Blood Culture - Blood, [[id number]]  (Normal) Collected:  12/01/17 1148    Lab Status:  Final result Specimen:  Blood from Arm, Left Updated:  12/06/17 1231     Blood Culture No growth at 5 days    Blood Culture - Blood, [[id number]]  (Normal) Collected:  12/01/17 1140    Lab Status:  Final result Specimen:  Blood from Arm, Right Updated:  12/06/17 1231     Blood Culture No growth at 5 days    Urine Culture - Urine, Urine, Clean Catch [[id number]]  (Abnormal)  (Susceptibility) Collected:  12/01/17 1137    Lab Status:  Final result Specimen:   Urine from Urine, Catheter Updated:  12/03/17 1143     Urine Culture --      >100,000 CFU/mL Escherichia coli (A)    Susceptibility      Escherichia coli     MARCUS     Ampicillin >16 ug/ml Resistant     Ampicillin + Sulbactam >16/8 ug/ml Resistant     Aztreonam <=8 ug/ml Susceptible     Cefepime <=8 ug/ml Susceptible     Cefotaxime <=2 ug/ml Susceptible     Ceftriaxone <=8 ug/ml Susceptible     Cefuroxime sodium <=4 ug/ml Susceptible     Cephalothin 16 ug/ml Intermediate     Ertapenem <=1 ug/ml Susceptible     Gentamicin <=4 ug/ml Susceptible     Levofloxacin <=2 ug/ml Susceptible     Meropenem <=1 ug/ml Susceptible     Nitrofurantoin <=32 ug/ml Susceptible     Piperacillin + Tazobactam <=16 ug/ml Susceptible     Tetracycline 8 ug/ml Intermediate     Tobramycin <=4 ug/ml Susceptible     Trimethoprim + Sulfamethoxazole >2/38 ug/ml Resistant                    Eosinophil Smear - Urine, Urine, Clean Catch [[id number]]  (Normal) Collected:  12/01/17 1702    Lab Status:  Final result Specimen:  Urine from Urine, Clean Catch Updated:  12/01/17 2137     Eosinophil Smear 0 % EOS/100 Cells     Narrative:       No eosinophil seen    Influenza A & B, RT PCR - Swab, Nasopharynx [[id number]]  (Normal) Collected:  12/01/17 1703    Lab Status:  Final result Specimen:  Swab from Nasopharynx Updated:  12/01/17 1820     Influenza A PCR Not Detected     Influenza B PCR Not Detected          Imaging Results (all)     Procedure Component Value Units Date/Time    XR Chest 1 View [[id number]] Collected:  12/01/17 1330     Updated:  12/01/17 1741    Narrative:       EXAMINATION: XR CHEST 1 VW- 12/01/2017     INDICATION: shortness of air     COMPARISON: NONE     FINDINGS: Cardiomegaly is noted. Bibasilar airspace opacities are noted  which are ill-defined and irregular. There is mild nodularity and  congestive abnormalities in the hilar areas. The upper lung zones are  clear.           Impression:       1. Cardiomegaly is noted with ill-defined  airspace linear nodular  opacities in the mid and lower lung zones with dense consolidation at  the bases, worse on the left than right.  2. Upper lung zones are otherwise clear.     D:  12/01/2017  E:  12/01/2017     This report was finalized on 12/1/2017 5:39 PM by Dr. Blue Gallo MD.       XR Chest 1 View [[id number]] Collected:  12/04/17 1328     Updated:  12/04/17 1359    Narrative:       EXAMINATION: XR CHEST 1 VW- 12/04/2017     INDICATION: R50.9-Fever, unspecified; J18.9-Pneumonia, unspecified  organism; E87.70-Fluid overload, unspecified; I50.9-Heart failure,  unspecified; N39.0-Urinary tract infection, site not specified;  R09.02-Hypoxemia; I48.2-Chronic atrial fibrillation      COMPARISON: 12/01/2017     FINDINGS:   1. Bilateral diffuse ill-defined airspace opacities are seen diffusely  throughout the mid and lower lung zones with coalescent consolidation at  the bases and a left base effusion.  2. There is cardiomegaly.           Impression:       Compared to previous studies of 3 days ago, the edema and  the ill-defined airspace opacities seen most densely in the mid and  lower lung zones bilaterally with high-grade consolidation at the left  base appear to be worse and have progressed somewhat.     D:  12/04/2017  E:  12/04/2017     This report was finalized on 12/4/2017 1:57 PM by Dr. Blue Gallo MD.       XR Chest 1 View [[id number]] Collected:  12/08/17 1825     Updated:  12/11/17 0818    Narrative:          EXAMINATION: XR CHEST 1 VW - 12/08/2017     INDICATION: R50.9-Fever, unspecified; J18.9-Pneumonia, unspecified  organism; E87.70-Fluid overload, unspecified; I50.9-Heart failure,  unspecified; N39.0-Urinary tract infection, site not specified;  R09.02-Hypoxemia; I48.2-Chronic atrial fibrillation.      COMPARISON: 12/04/2017.     FINDINGS: There is bilateral airspace disease relatively sparing the  left upper lobe. The heart is slightly large. There has been little  change since the previous  examination of 12/04/2017.           Impression:       Bilateral airspace disease, not significantly changed since  12/04/2017.     DICTATED:     12/08/2017  EDITED:          12/08/2017           This report was finalized on 12/11/2017 8:16 AM by Dr. Enio Rodriguez MD.             Results for orders placed during the hospital encounter of 12/01/17   Adult Transthoracic Echo Complete W/ Cont if Necessary Per Protocol    Narrative · Left ventricular systolic function is normal. Estimated EF = 55%.  · Mild aortic valve regurgitation is present.  · Mild-to-moderate mitral valve regurgitation is present  · Moderate to severe tricuspid valve regurgitation is present.  · Calculated right ventricular systolic pressure from tricuspid   regurgitation is 55 mmHg.              Discharge Details      Katie Crocker   Home Medication Instructions ALBAN:[id number]    Printed on:12/14/17 1121   Medication Information                      acetaminophen (TYLENOL) 325 MG tablet  Take 650 mg by mouth Every 6 (Six) Hours As Needed for Mild Pain .             allopurinol (ZYLOPRIM) 300 MG tablet  Take 150 mg by mouth Daily.             atorvastatin (LIPITOR) 10 MG tablet  Take 1 tablet by mouth Daily.             cholecalciferol (VITAMIN D3) 1000 units tablet  Take 1,000 Units by mouth Daily.             citalopram (CeleXA) 20 MG tablet  Take 20 mg by mouth Daily.             digoxin (LANOXIN) 125 MCG tablet  Take 125 mcg by mouth Daily. 1/2 tablet one time daily             divalproex (DEPAKOTE) 500 MG DR tablet  Take 500 mg by mouth Daily.             lactobacillus acidophilus (RISAQUAD) capsule capsule  Take 1 capsule by mouth Daily.             levothyroxine (SYNTHROID, LEVOTHROID) 50 MCG tablet  Take 50 mcg by mouth Daily.             magnesium hydroxide (MILK OF MAGNESIA) 400 MG/5ML suspension  Take 30 mL by mouth 2 (Two) Times a Day As Needed for Constipation.             metoprolol tartrate (LOPRESSOR) 25 MG tablet  Take 1  tablet by mouth Every 12 (Twelve) Hours.             nystatin (MYCOSTATIN) 589785 UNIT/GM powder  Apply  topically Every 12 (Twelve) Hours.             polyethylene glycol (MIRALAX) packet  Take 17 g by mouth Daily.             sennosides-docusate sodium (SENOKOT-S) 8.6-50 MG tablet  Take 2 tablets by mouth 2 (Two) Times a Day As Needed for Constipation.             warfarin (COUMADIN) 1 MG tablet  Take 1 tablet by mouth Daily. With daily INR until therapeutic at 2-3                   Discharge Disposition:  Skilled Nursing Facility (DC - External)    Discharge Diet:  Diet Instructions     Diet: Soft Texture; Thin Liquids, No Restrictions; Ground       Discharge Diet:  Soft Texture   Fluid Consistency:  Thin Liquids, No Restrictions   Soft Options:  Ground                 Discharge Activity:  Activity Instructions     Activity as Tolerated                   No future appointments.    Additional Instructions for the Follow-ups that You Need to Schedule     Discharge Follow-up with PCP    As directed    Follow Up Details:  To be seen by provider at SNF in 24-48 hours. PCP 1 week of dc           Discharge Follow-up with Specialty: Pt will need next PT/INR tomorrow, 12/15 and daily until therapeutic INR 2-3.  Adjust coumadin dose accordingly.  Hx afib.  Daughter wants pt anticoagulated for now.    As directed    Specialty:  Pt will need next PT/INR tomorrow, 12/15 and daily until therapeutic INR 2-3.  Adjust coumadin dose accordingly.  Hx afib.  Daughter wants pt anticoagulated for now.           Discharge Follow-up with Specified Provider: [facility] to follow at SNF.    As directed    To:  [facility] to follow at SNF.                       Time Spent on Discharge:  50 minutes    MOODY Chavez  12/14/17  11:21 AM       Electronically signed by MOODY Chavez at 12/14/2017 12:02 PM

## 2017-12-14 NOTE — PLAN OF CARE
"Problem: Patient Care Overview (Adult)  Goal: Plan of Care Review  Outcome: Ongoing (interventions implemented as appropriate)    12/14/17 0307 12/14/17 1000 12/14/17 1056   Coping/Psychosocial Response Interventions   Plan Of Care Reviewed With --  patient --    Patient Care Overview   Progress improving --  --    Outcome Evaluation   Outcome Summary/Follow up Plan --  --  pts resting in between care. pt asking, \"when am I going to [facility]?\" pt VSS although pt gets tachypneic and tachycardic when turned and bathed. ambulance scheduled for 1300         Problem: Fall Risk (Adult)  Goal: Identify Related Risk Factors and Signs and Symptoms  Outcome: Ongoing (interventions implemented as appropriate)  Goal: Absence of Falls  Outcome: Ongoing (interventions implemented as appropriate)      "

## 2017-12-14 NOTE — PLAN OF CARE
Problem: Patient Care Overview (Adult)  Goal: Plan of Care Review  Outcome: Ongoing (interventions implemented as appropriate)    12/14/17 0302   Coping/Psychosocial Response Interventions   Plan Of Care Reviewed With patient   Patient Care Overview   Progress improving   Outcome Evaluation   Outcome Summary/Follow up Plan Patient rested well this evening. Not compliaing of any pain. Ambulance shceduled tomrrow for [facility]         Problem: Fall Risk (Adult)  Goal: Identify Related Risk Factors and Signs and Symptoms  Outcome: Ongoing (interventions implemented as appropriate)

## 2017-12-14 NOTE — PROGRESS NOTES
Continued Stay Note  [facility]     Patient Name: Katie Crocker  MRN: [id number]  Today's Date: 12/14/2017    Admit Date: 12/1/2017          Discharge Plan       12/14/17 1645    Case Management/Social Work Plan    Plan [facility] with [facility] team    Patient/Family In Agreement With Plan yes    Final Note    Final Note D/C'd per [facility] ambulance to [facility] with [facility] team.  Primary RN to call report to [phone] and fax transfer summary to [phone].  Update to [facility] Intake Admission SpecialistErin.              Discharge Codes     None        Expected Discharge Date and Time     Expected Discharge Date Expected Discharge Time    Dec 14, 2017             Caryl Anthony RN

## 2017-12-14 NOTE — DISCHARGE SUMMARY
[facility] Medicine Services  TRANSFER SUMMARY    Patient Name: Katie Crocker  : 3/1/1923  MRN: [id number]    Date of Admission: 2017  Date of Discharge:    Length of Stay: 13  Primary Care Physician: Jose Armando Sharif MD    Consults     Date and Time Order Name Status Description    2017 1413 Inpatient Consult to Palliative Care MD Completed     2017 1602 Inpatient Consult to Infectious Diseases Completed           Hospital Course     Presenting Problem:   Sepsis [A41.9]      Active Hospital Problems (** Indicates Principal Problem)    Diagnosis Date Noted   • **Sepsis [A41.9] 2017   • HCAP (healthcare-associated pneumonia) [J18.9] 2017   • Urinary tract infection [N39.0] 2017   • Hypothyroidism [E03.9] 2017   • Hemiparesis affecting right side as late effect of stroke [I69.351] 2017   • Atrial fibrillation [I48.91] 2017   • Anemia [D64.9] 2017   • Hypertension [I10]    • Hyperlipidemia [E78.5]    • GERD (gastroesophageal reflux disease) [K21.9]    • CHF (congestive heart failure) [I50.9]    • Depression [F32.9]    • Gout [M10.9]       Resolved Hospital Problems    Diagnosis Date Noted Date Resolved   • Supratherapeutic INR [R79.1] 2017   • Acute on chronic kidney failure [N17.9, N18.9] 2017          Hospital Course:  Katie Crocker is a 89 y.o. female with past medical history significant for atrial fibrillation on Coumadin, CVA with right side hemiparesis, hypertension, CHF, frequent urinary tract infections who presents to the emergency department with fever.  She is a nursing home resident ([facility]).  History of present illness was obtained from daughter and chart. She developed fever on .  Chest x-ray was performed at [facility] and per daughter's report was clear (data deficient).  [facility] contacted daughter on  to report no fever.  Fever developed on  and she was  brought to the emergency department.  In emergency department she was found to have pneumonia, CHF, UTI, A/KCD.  Chart review revealed history of multidrug resistant UTI. She met sepsis criteria on admit due to infection, hypotension, AMS and elevated procalcitonin.  Arm was too high to calculate and she was given vitamin K for reversal.  Self catheter was anchored due to AK I and altered mental status.  She was started on Merrem, vancomycin and doxycycline.  This is due to her current infection as well as history of MDR UTIs.  Cultures were obtained.  She was admitted to hospital medicine service for further evaluation and management.    Infectious disease was consulted and followed for antibiotic management due to her H CAP, MDR UTI and sepsis present on admission with altered mental status.  Acute on chronic kidney disease resolved.  She was slowly restarted on her Coumadin per her daughter's request for her history of A. fib.  She did have labile INRs.  Currently now on 1 mg daily and today's INR is 1.74.  He has mild dysphagia and went swallow eval and failed ×2 now on a dysphagia diet.  Will status slowly improved as her infection resolved.  Due to her age, multiple comorbidities along with this acute illness decreased appetite and decreased mobility with debility, palliative care was consulted.  Pt/family elected for comfort care measures and AND status.  She continued to have anorexia.  Continued with dysphagia.  Her altered mental status has improved.  Continues to be very weak, decreased appetite.  Finally due to patient's poor clinical improvement patient and daughter elected to stop antibiotics and pursue comfort measures only.  Daughter did wish to continue Coumadin at this time.  Infectious disease discontinued antibiotics and signed off.  Hospice care was consulted.  Decision was ultimately to transfer back to her bed at [facility] with hospice to follow.  Today  she is seen resting upright in bed in no acute distress.  Visitors at bedside currently.  No issues reported per nursing staff.  Patient will transfer back to [facility] with hospice to follow today via ambulance at 1 PM.  Recommend continuing 1 mg of Coumadin daily (as daughter wishes to continue Coumadin for now) with next PT/INR tomorrow 12/15.  Provider at facility/PCP to manage Coumadin.  Would recommend daily PT/INRs until therapeutic between 2-3.  Need to consider stopping Coumadin in the near future if patient maintains hospice status continued weakness, due to risk of falls and bleeding.  We'll defer this to provider's outpatient.  She should be seen by provider at skilled facility in 24-48 hours, PCP 1 week, hospice to follow.          Day of Discharge     HPI:   Pt is seeen at 0900 am resting back in bed.  NAD.  NO family at bedside.  Wearing 3LNC oxygen.  Sats 92 %. Dozing mostly.  Awakens easily. Reports poor appetite but no n/v.  No cp, soa.  No new issues    Review of Systems  Gen- No fevers, chills  CV- No chest pain, palpitations  Resp- No cough, dyspnea  GI- No N/V/D, abd pain    Otherwise complete ROS is negative except as mentioned in the HPI.    Vital Signs:   Heart Rate:  [] 108  BP: (121-167)/(78-90) 167/78     Physical Exam:  Constitutional: No acute distress, dozing mostly but awakens easily, on supplemental oxygen by NC 3 L/min.  No visitors at bs.   HENT: NCAT, mucous membranes moist  Respiratory: grossly clear, poor inspiratory capacity.  No acute resp distress  Cardiovascular: RRR, s1 and s2  Gastrointestinal: Positive bowel sounds, soft, nontender, nondistended  Musculoskeletal: No bilateral ankle edema.    Psychiatric: Appropriate affect, cooperative, calm but quiet  Neurologic: Awakens easily, Oriented , strength symmetric in all extremities, Cranial Nerves grossly intact to confrontation, speech clear  Skin: No rashes      Pertinent Results       Results from last 7 days  Lab Units  12/11/17  0509 12/09/17  0449 12/08/17  0440 12/07/17 2007   WBC 10*3/mm3 15.49* 13.77* 14.84*  --    HEMOGLOBIN g/dL 10.4* 9.7* 9.4*  --    HEMATOCRIT % 33.2* 30.7* 29.8*  --    PLATELETS 10*3/mm3 324 321 336  --    SODIUM mmol/L 137 139 140  --    POTASSIUM mmol/L 3.8 4.4 3.9 4.7   CHLORIDE mmol/L 101 107 110*  --    CO2 mmol/L 29.0 22.0 23.0  --    BUN mg/dL 9 9 11  --    CREATININE mg/dL 0.70 0.70 0.80  --    GLUCOSE mg/dL 80 68* 84  --    CALCIUM mg/dL 8.4* 8.4* 8.6*  --        Results from last 7 days  Lab Units 12/14/17  0426 12/13/17  0506 12/12/17  0845 12/11/17  0509 12/10/17  0502 12/09/17 0449 12/08/17 0440   PROTIME Seconds 19.3* 15.4* 16.6* 32.8* 30.7* 37.7* 44.8*   INR  1.74 1.40 1.50 2.91 2.73 3.33 3.94           Invalid input(s): TG, LDLREALC      Brief Urine Lab Results  (Last result in the past 365 days)      Color   Clarity   Blood   Leuk Est   Nitrite   Protein   CREAT   Urine HCG        12/05/17 2009 Yellow Clear Negative Small (1+)(A) Negative 30 mg/dL (1+)(A)               Microbiology Results Abnormal     Procedure Component Value - Date/Time    Blood Culture - Blood, [[id number]]  (Normal) Collected:  12/05/17 1420    Lab Status:  Final result Specimen:  Blood from Arm, Left Updated:  12/10/17 1501     Blood Culture No growth at 5 days    Blood Culture - Blood, [[id number]]  (Normal) Collected:  12/05/17 1415    Lab Status:  Final result Specimen:  Blood from Arm, Left Updated:  12/10/17 1501     Blood Culture No growth at 5 days    Urine Culture - Urine, Urine, Clean Catch [[id number]]  (Abnormal) Collected:  12/05/17 2009    Lab Status:  Final result Specimen:  Urine from Urine, Clean Catch Updated:  12/07/17 1146     Urine Culture --      20,000-30,000 CFU/mL Yeast isolated (A)    Blood Culture - Blood, [[id number]]  (Normal) Collected:  12/01/17 1148    Lab Status:  Final result Specimen:  Blood from Arm, Left Updated:  12/06/17 1231     Blood Culture No growth at 5 days    Blood Culture  - Blood, [[id number]]  (Normal) Collected:  12/01/17 1140    Lab Status:  Final result Specimen:  Blood from Arm, Right Updated:  12/06/17 1231     Blood Culture No growth at 5 days    Urine Culture - Urine, Urine, Clean Catch [[id number]]  (Abnormal)  (Susceptibility) Collected:  12/01/17 1137    Lab Status:  Final result Specimen:  Urine from Urine, Catheter Updated:  12/03/17 1143     Urine Culture --      >100,000 CFU/mL Escherichia coli (A)    Susceptibility      Escherichia coli     MARCUS     Ampicillin >16 ug/ml Resistant     Ampicillin + Sulbactam >16/8 ug/ml Resistant     Aztreonam <=8 ug/ml Susceptible     Cefepime <=8 ug/ml Susceptible     Cefotaxime <=2 ug/ml Susceptible     Ceftriaxone <=8 ug/ml Susceptible     Cefuroxime sodium <=4 ug/ml Susceptible     Cephalothin 16 ug/ml Intermediate     Ertapenem <=1 ug/ml Susceptible     Gentamicin <=4 ug/ml Susceptible     Levofloxacin <=2 ug/ml Susceptible     Meropenem <=1 ug/ml Susceptible     Nitrofurantoin <=32 ug/ml Susceptible     Piperacillin + Tazobactam <=16 ug/ml Susceptible     Tetracycline 8 ug/ml Intermediate     Tobramycin <=4 ug/ml Susceptible     Trimethoprim + Sulfamethoxazole >2/38 ug/ml Resistant                    Eosinophil Smear - Urine, Urine, Clean Catch [[id number]]  (Normal) Collected:  12/01/17 1702    Lab Status:  Final result Specimen:  Urine from Urine, Clean Catch Updated:  12/01/17 2137     Eosinophil Smear 0 % EOS/100 Cells     Narrative:       No eosinophil seen    Influenza A & B, RT PCR - Swab, Nasopharynx [[id number]]  (Normal) Collected:  12/01/17 1703    Lab Status:  Final result Specimen:  Swab from Nasopharynx Updated:  12/01/17 1820     Influenza A PCR Not Detected     Influenza B PCR Not Detected          Imaging Results (all)     Procedure Component Value Units Date/Time    XR Chest 1 View [[id number]] Collected:  12/01/17 1330     Updated:  12/01/17 1741    Narrative:       EXAMINATION: XR CHEST 1 VW- 12/01/2017      INDICATION: shortness of air     COMPARISON: NONE     FINDINGS: Cardiomegaly is noted. Bibasilar airspace opacities are noted  which are ill-defined and irregular. There is mild nodularity and  congestive abnormalities in the hilar areas. The upper lung zones are  clear.           Impression:       1. Cardiomegaly is noted with ill-defined airspace linear nodular  opacities in the mid and lower lung zones with dense consolidation at  the bases, worse on the left than right.  2. Upper lung zones are otherwise clear.     D:  12/01/2017  E:  12/01/2017     This report was finalized on 12/1/2017 5:39 PM by Dr. Blue Gallo MD.       XR Chest 1 View [[id number]] Collected:  12/04/17 1328     Updated:  12/04/17 1359    Narrative:       EXAMINATION: XR CHEST 1 VW- 12/04/2017     INDICATION: R50.9-Fever, unspecified; J18.9-Pneumonia, unspecified  organism; E87.70-Fluid overload, unspecified; I50.9-Heart failure,  unspecified; N39.0-Urinary tract infection, site not specified;  R09.02-Hypoxemia; I48.2-Chronic atrial fibrillation      COMPARISON: 12/01/2017     FINDINGS:   1. Bilateral diffuse ill-defined airspace opacities are seen diffusely  throughout the mid and lower lung zones with coalescent consolidation at  the bases and a left base effusion.  2. There is cardiomegaly.           Impression:       Compared to previous studies of 3 days ago, the edema and  the ill-defined airspace opacities seen most densely in the mid and  lower lung zones bilaterally with high-grade consolidation at the left  base appear to be worse and have progressed somewhat.     D:  12/04/2017  E:  12/04/2017     This report was finalized on 12/4/2017 1:57 PM by Dr. Blue Gallo MD.       XR Chest 1 View [[id number]] Collected:  12/08/17 1825     Updated:  12/11/17 0818    Narrative:          EXAMINATION: XR CHEST 1 VW - 12/08/2017     INDICATION: R50.9-Fever, unspecified; J18.9-Pneumonia, unspecified  organism; E87.70-Fluid overload,  unspecified; I50.9-Heart failure,  unspecified; N39.0-Urinary tract infection, site not specified;  R09.02-Hypoxemia; I48.2-Chronic atrial fibrillation.      COMPARISON: 12/04/2017.     FINDINGS: There is bilateral airspace disease relatively sparing the  left upper lobe. The heart is slightly large. There has been little  change since the previous examination of 12/04/2017.           Impression:       Bilateral airspace disease, not significantly changed since  12/04/2017.     DICTATED:     12/08/2017  EDITED:          12/08/2017           This report was finalized on 12/11/2017 8:16 AM by Dr. Enio Rodriguez MD.             Results for orders placed during the hospital encounter of 12/01/17   Adult Transthoracic Echo Complete W/ Cont if Necessary Per Protocol    Narrative · Left ventricular systolic function is normal. Estimated EF = 55%.  · Mild aortic valve regurgitation is present.  · Mild-to-moderate mitral valve regurgitation is present  · Moderate to severe tricuspid valve regurgitation is present.  · Calculated right ventricular systolic pressure from tricuspid   regurgitation is 55 mmHg.              Discharge Details      Katie Crocker   Home Medication Instructions ALBAN:[id number]    Printed on:12/14/17 1121   Medication Information                      acetaminophen (TYLENOL) 325 MG tablet  Take 650 mg by mouth Every 6 (Six) Hours As Needed for Mild Pain .             allopurinol (ZYLOPRIM) 300 MG tablet  Take 150 mg by mouth Daily.             atorvastatin (LIPITOR) 10 MG tablet  Take 1 tablet by mouth Daily.             cholecalciferol (VITAMIN D3) 1000 units tablet  Take 1,000 Units by mouth Daily.             citalopram (CeleXA) 20 MG tablet  Take 20 mg by mouth Daily.             digoxin (LANOXIN) 125 MCG tablet  Take 125 mcg by mouth Daily. 1/2 tablet one time daily             divalproex (DEPAKOTE) 500 MG DR tablet  Take 500 mg by mouth Daily.             lactobacillus acidophilus  (RISAQUAD) capsule capsule  Take 1 capsule by mouth Daily.             levothyroxine (SYNTHROID, LEVOTHROID) 50 MCG tablet  Take 50 mcg by mouth Daily.             magnesium hydroxide (MILK OF MAGNESIA) 400 MG/5ML suspension  Take 30 mL by mouth 2 (Two) Times a Day As Needed for Constipation.             metoprolol tartrate (LOPRESSOR) 25 MG tablet  Take 1 tablet by mouth Every 12 (Twelve) Hours.             nystatin (MYCOSTATIN) 869923 UNIT/GM powder  Apply  topically Every 12 (Twelve) Hours.             polyethylene glycol (MIRALAX) packet  Take 17 g by mouth Daily.             sennosides-docusate sodium (SENOKOT-S) 8.6-50 MG tablet  Take 2 tablets by mouth 2 (Two) Times a Day As Needed for Constipation.             warfarin (COUMADIN) 1 MG tablet  Take 1 tablet by mouth Daily. With daily INR until therapeutic at 2-3                   Discharge Disposition:  Skilled Nursing Facility (DC - External)    Discharge Diet:  Diet Instructions     Diet: Soft Texture; Thin Liquids, No Restrictions; Ground       Discharge Diet:  Soft Texture   Fluid Consistency:  Thin Liquids, No Restrictions   Soft Options:  Ground                 Discharge Activity:  Activity Instructions     Activity as Tolerated                   No future appointments.    Additional Instructions for the Follow-ups that You Need to Schedule     Discharge Follow-up with PCP    As directed    Follow Up Details:  To be seen by provider at SNF in 24-48 hours. PCP 1 week of dc           Discharge Follow-up with Specialty: Pt will need next PT/INR tomorrow, 12/15 and daily until therapeutic INR 2-3.  Adjust coumadin dose accordingly.  Hx afib.  Daughter wants pt anticoagulated for now.    As directed    Specialty:  Pt will need next PT/INR tomorrow, 12/15 and daily until therapeutic INR 2-3.  Adjust coumadin dose accordingly.  Hx afib.  Daughter wants pt anticoagulated for now.           Discharge Follow-up with Specified Provider: [facility] to  follow at SNF.    As directed    To:  [facility] to follow at SNF.                       Time Spent on Discharge:  50 minutes    Nika Butterfield, APRN  12/14/17  11:21 AM

## 2023-11-30 NOTE — ED PROVIDER NOTES
Subjective   HPI Comments: Katie Crocker is a 89 y.o.female who presents to the ED with c/o a fever. She denies abdominal pain, chest pain, difficulty breathing, or any other complaints at this time. Here in the ED, she is alert and oriented X4 but is fatigued.       Patient is a 89 y.o. female presenting with fever.   History provided by:  Patient, EMS personnel and medical records  Fever   Temp source:  Subjective and temporal  Severity:  Moderate  Onset quality:  Gradual  Timing:  Constant  Progression:  Unchanged  Chronicity:  New  Relieved by:  None tried  Worsened by:  Nothing  Ineffective treatments:  None tried  Associated symptoms: no chest pain and no myalgias        Review of Systems   Unable to perform ROS: Age   Constitutional: Positive for fever.   Cardiovascular: Negative for chest pain.   Musculoskeletal: Negative for myalgias.   All other systems reviewed and are negative.      Past Medical History:   Diagnosis Date   • Bipolar disorder    • Dehydration    • Depression    • Disease of thyroid gland    • Diverticulosis    • GERD (gastroesophageal reflux disease)    • Gout    • Hyperkalemia    • Hyperlipidemia    • Hypertension    • Insomnia    • Pneumonia    • Renal disorder    • Sepsis    • SVT (supraventricular tachycardia)    • TIA (transient ischemic attack)    • UTI (urinary tract infection)        Allergies   Allergen Reactions   • Lithium        History reviewed. No pertinent surgical history.    History reviewed. No pertinent family history.    Social History     Social History   • Marital status:      Spouse name: N/A   • Number of children: N/A   • Years of education: N/A     Social History Main Topics   • Smoking status: Never Smoker   • Smokeless tobacco: None   • Alcohol use No   • Drug use: No   • Sexual activity: Not Asked     Other Topics Concern   • None     Social History Narrative   • None         Objective   Physical Exam   Constitutional: She is oriented to person,  place, and time. She appears well-developed and well-nourished. No distress.   HENT:   Head: Normocephalic.   Nose: Nose normal.   Eyes: Conjunctivae are normal. No scleral icterus.   Neck: Normal range of motion. Neck supple.   Cardiovascular: Normal rate, regular rhythm, normal heart sounds and intact distal pulses.    No murmur heard.  Pulmonary/Chest: Tachypnea noted. No respiratory distress. She has rales.   Tachypnea.    Abdominal: Soft. Bowel sounds are normal. There is no tenderness.   Musculoskeletal: Normal range of motion. She exhibits no tenderness.   Neurological: She is alert and oriented to person, place, and time.   Skin: Skin is warm and dry.   Psychiatric: She has a normal mood and affect. She is slowed.   Nursing note and vitals reviewed.      Critical Care  Performed by: JACKY CONDON  Authorized by: JACKY CONDON     Critical care provider statement:     Critical care time (minutes):  45    Critical care end time:  12/1/2017 12:47 PM    Critical care time was exclusive of:  Separately billable procedures and treating other patients    Critical care was necessary to treat or prevent imminent or life-threatening deterioration of the following conditions:  Respiratory failure and sepsis    Critical care was time spent personally by me on the following activities:  Discussions with consultants, examination of patient, obtaining history from patient or surrogate, evaluation of patient's response to treatment, ordering and review of laboratory studies, ordering and review of radiographic studies, pulse oximetry, re-evaluation of patient's condition and review of old charts             ED Course  ED Course   Value Comment By Time   Temp: (!) 102.8 °F (39.3 °C) (Reviewed) Jacky Condon MD 12/01 1138   Resp: (!) 36 (Reviewed) Jacky Condon MD 12/01 1138   SpO2: (!) 86 % (Reviewed) Jacky Condon MD 12/01 1138   WBC, UA: (!) Too Numerous to Count (Reviewed) Jacky Vasquez  MD [place] 12/01 1217   Bacteria, UA: (!) 2+ (Reviewed) Jacky Condon MD 12/01 1217   WBC: (!) 18.53 (Reviewed) Jacky Condon MD 12/01 1217   Leuk Esterase, UA: (!) Large (3+) (Reviewed) Jacky Condon MD 12/01 1217   BNP: (!) 1060.0 (Reviewed) Jacky Condon MD 12/01 1224    Patient with findings consistent with urinary tract infection as well as pneumonia.  I believe the pneumonia most likely is more of a congestive heart failure type picture.  30 cc/kg bolus not provided secondary to the evidence of volume overload and no evidence of volume depletion.  Antibiotics initiated in the ER we will admit to the hospitalist for further evaluation and management. Jacky Condon MD 12/01 1226                     MDM  Number of Diagnoses or Management Options  Diagnosis management comments: ECG/EMG Results (last 24 hours)     Procedure Component Value Units Date/Time    ECG 12 Lead ([id number]) Collected:  12/01/17 1236     Updated:  12/01/17 1245             Amount and/or Complexity of Data Reviewed  Clinical lab tests: reviewed  Tests in the radiology section of CPT®: reviewed  Decide to obtain previous medical records or to obtain history from someone other than the patient: yes  Obtain history from someone other than the patient: yes  Review and summarize past medical records: yes  Discuss the patient with other providers: yes  Independent visualization of images, tracings, or specimens: yes    Critical Care  Total time providing critical care: 30-74 minutes      Final diagnoses:   Acute febrile illness   Pneumonia of both lower lobes due to infectious organism   Hypervolemia, unspecified hypervolemia type   Acute on chronic congestive heart failure, unspecified congestive heart failure type   Urinary tract infection without hematuria, site unspecified   Hypoxemia   Permanent atrial fibrillation       Documentation assistance provided by bethany Dixon.  Information recorded by  the scribe was done at my direction and has been verified and validated by me.     Delfino Dixon  12/01/17 1205       Jacky Condon MD  12/01/17 5271     warm and dry/color normal/normal/no rashes/no ulcers